# Patient Record
Sex: FEMALE | Race: WHITE | NOT HISPANIC OR LATINO | Employment: OTHER | URBAN - METROPOLITAN AREA
[De-identification: names, ages, dates, MRNs, and addresses within clinical notes are randomized per-mention and may not be internally consistent; named-entity substitution may affect disease eponyms.]

---

## 2018-08-05 ENCOUNTER — APPOINTMENT (OUTPATIENT)
Dept: CT IMAGING | Facility: HOSPITAL | Age: 63
End: 2018-08-05

## 2018-08-05 ENCOUNTER — HOSPITAL ENCOUNTER (OUTPATIENT)
Facility: HOSPITAL | Age: 63
Setting detail: OBSERVATION
Discharge: HOME | End: 2018-08-06
Attending: EMERGENCY MEDICINE | Admitting: EMERGENCY MEDICINE

## 2018-08-05 ENCOUNTER — APPOINTMENT (OUTPATIENT)
Dept: GENERAL RADIOLOGY | Facility: HOSPITAL | Age: 63
End: 2018-08-05

## 2018-08-05 DIAGNOSIS — R11.2 INTRACTABLE VOMITING WITH NAUSEA, UNSPECIFIED VOMITING TYPE: Primary | ICD-10-CM

## 2018-08-05 DIAGNOSIS — E87.1 HYPONATREMIA: ICD-10-CM

## 2018-08-05 DIAGNOSIS — E87.29 HIGH ANION GAP METABOLIC ACIDOSIS: ICD-10-CM

## 2018-08-05 PROBLEM — I10 ESSENTIAL (PRIMARY) HYPERTENSION: Status: ACTIVE | Noted: 2018-08-05

## 2018-08-05 PROBLEM — E87.20 LACTIC ACIDOSIS: Status: ACTIVE | Noted: 2018-08-05

## 2018-08-05 PROBLEM — E08.43 DIABETES MELLITUS DUE TO UNDERLYING CONDITION WITH DIABETIC AUTONOMIC NEUROPATHY, WITHOUT LONG-TERM CURRENT USE OF INSULIN (HCC): Status: ACTIVE | Noted: 2018-08-05

## 2018-08-05 PROBLEM — E03.9 ACQUIRED HYPOTHYROIDISM: Status: ACTIVE | Noted: 2018-08-05

## 2018-08-05 LAB
ALBUMIN SERPL-MCNC: 5.7 G/DL (ref 3.5–5.2)
ALBUMIN/GLOB SERPL: 2.1 G/DL
ALP SERPL-CCNC: 59 U/L (ref 39–117)
ALT SERPL W P-5'-P-CCNC: 21 U/L (ref 1–33)
ANION GAP SERPL CALCULATED.3IONS-SCNC: 30.6 MMOL/L
APAP SERPL-MCNC: <5 MCG/ML (ref 10–30)
AST SERPL-CCNC: 25 U/L (ref 1–32)
BACTERIA UR QL AUTO: NORMAL /HPF
BASOPHILS # BLD AUTO: 0 10*3/MM3 (ref 0–0.2)
BASOPHILS NFR BLD AUTO: 0 % (ref 0–1.5)
BILIRUB SERPL-MCNC: 1.8 MG/DL (ref 0.1–1.2)
BILIRUB UR QL STRIP: NEGATIVE
BUN BLD-MCNC: 19 MG/DL (ref 8–23)
BUN/CREAT SERPL: 20.9 (ref 7–25)
CALCIUM SPEC-SCNC: 10.1 MG/DL (ref 8.6–10.5)
CHLORIDE SERPL-SCNC: 70 MMOL/L (ref 98–107)
CK SERPL-CCNC: 215 U/L (ref 20–180)
CLARITY UR: CLEAR
CO2 SERPL-SCNC: 25.4 MMOL/L (ref 22–29)
COLOR UR: YELLOW
CREAT BLD-MCNC: 0.91 MG/DL (ref 0.57–1)
D-LACTATE SERPL-SCNC: 1.5 MMOL/L (ref 0.5–2)
D-LACTATE SERPL-SCNC: 2.4 MMOL/L (ref 0.5–2)
DEPRECATED RDW RBC AUTO: 41.8 FL (ref 37–54)
EOSINOPHIL # BLD AUTO: 0 10*3/MM3 (ref 0–0.7)
EOSINOPHIL NFR BLD AUTO: 0 % (ref 0.3–6.2)
ERYTHROCYTE [DISTWIDTH] IN BLOOD BY AUTOMATED COUNT: 13.2 % (ref 11.7–13)
GFR SERPL CREATININE-BSD FRML MDRD: 62 ML/MIN/1.73
GLOBULIN UR ELPH-MCNC: 2.7 GM/DL
GLUCOSE BLD-MCNC: 155 MG/DL (ref 65–99)
GLUCOSE BLDC GLUCOMTR-MCNC: 125 MG/DL (ref 70–130)
GLUCOSE BLDC GLUCOMTR-MCNC: 158 MG/DL (ref 70–130)
GLUCOSE UR STRIP-MCNC: NEGATIVE MG/DL
HCT VFR BLD AUTO: 43.2 % (ref 35.6–45.5)
HGB BLD-MCNC: 15.3 G/DL (ref 11.9–15.5)
HGB UR QL STRIP.AUTO: ABNORMAL
HOLD SPECIMEN: NORMAL
HYALINE CASTS UR QL AUTO: NORMAL /LPF
IMM GRANULOCYTES # BLD: 0.04 10*3/MM3 (ref 0–0.03)
IMM GRANULOCYTES NFR BLD: 0.3 % (ref 0–0.5)
KETONES UR QL STRIP: ABNORMAL
LEUKOCYTE ESTERASE UR QL STRIP.AUTO: NEGATIVE
LIPASE SERPL-CCNC: 26 U/L (ref 13–60)
LYMPHOCYTES # BLD AUTO: 0.56 10*3/MM3 (ref 0.9–4.8)
LYMPHOCYTES NFR BLD AUTO: 3.9 % (ref 19.6–45.3)
MAGNESIUM SERPL-MCNC: 1.9 MG/DL (ref 1.6–2.4)
MCH RBC QN AUTO: 31 PG (ref 26.9–32)
MCHC RBC AUTO-ENTMCNC: 35.4 G/DL (ref 32.4–36.3)
MCV RBC AUTO: 87.4 FL (ref 80.5–98.2)
MONOCYTES # BLD AUTO: 0.4 10*3/MM3 (ref 0.2–1.2)
MONOCYTES NFR BLD AUTO: 2.8 % (ref 5–12)
NEUTROPHILS # BLD AUTO: 13.52 10*3/MM3 (ref 1.9–8.1)
NEUTROPHILS NFR BLD AUTO: 93.3 % (ref 42.7–76)
NITRITE UR QL STRIP: NEGATIVE
OSMOLALITY UR: 799 MOSM/KG
PH UR STRIP.AUTO: <=5 [PH] (ref 5–8)
PHOSPHATE SERPL-MCNC: 3.2 MG/DL (ref 2.5–4.5)
PLATELET # BLD AUTO: 208 10*3/MM3 (ref 140–500)
PMV BLD AUTO: 9.4 FL (ref 6–12)
POTASSIUM BLD-SCNC: 4.1 MMOL/L (ref 3.5–5.2)
PROT SERPL-MCNC: 8.4 G/DL (ref 6–8.5)
PROT UR QL STRIP: ABNORMAL
RBC # BLD AUTO: 4.94 10*6/MM3 (ref 3.9–5.2)
RBC # UR: NORMAL /HPF
REF LAB TEST METHOD: NORMAL
SALICYLATES SERPL-MCNC: <0.3 MG/DL
SODIUM BLD-SCNC: 126 MMOL/L (ref 136–145)
SODIUM UR-SCNC: 87 MMOL/L
SP GR UR STRIP: 1.02 (ref 1–1.03)
SQUAMOUS #/AREA URNS HPF: NORMAL /HPF
TSH SERPL DL<=0.05 MIU/L-ACNC: 5.15 MIU/ML (ref 0.27–4.2)
UROBILINOGEN UR QL STRIP: ABNORMAL
WBC NRBC COR # BLD: 14.48 10*3/MM3 (ref 4.5–10.7)
WBC UR QL AUTO: NORMAL /HPF
WHOLE BLOOD HOLD SPECIMEN: NORMAL

## 2018-08-05 PROCEDURE — 84300 ASSAY OF URINE SODIUM: CPT | Performed by: EMERGENCY MEDICINE

## 2018-08-05 PROCEDURE — 83690 ASSAY OF LIPASE: CPT

## 2018-08-05 PROCEDURE — 93005 ELECTROCARDIOGRAM TRACING: CPT | Performed by: EMERGENCY MEDICINE

## 2018-08-05 PROCEDURE — 71250 CT THORAX DX C-: CPT

## 2018-08-05 PROCEDURE — 96361 HYDRATE IV INFUSION ADD-ON: CPT

## 2018-08-05 PROCEDURE — 80053 COMPREHEN METABOLIC PANEL: CPT

## 2018-08-05 PROCEDURE — 74177 CT ABD & PELVIS W/CONTRAST: CPT

## 2018-08-05 PROCEDURE — G0378 HOSPITAL OBSERVATION PER HR: HCPCS

## 2018-08-05 PROCEDURE — 84443 ASSAY THYROID STIM HORMONE: CPT | Performed by: EMERGENCY MEDICINE

## 2018-08-05 PROCEDURE — 83605 ASSAY OF LACTIC ACID: CPT | Performed by: EMERGENCY MEDICINE

## 2018-08-05 PROCEDURE — 25010000002 ENOXAPARIN PER 10 MG: Performed by: INTERNAL MEDICINE

## 2018-08-05 PROCEDURE — 71046 X-RAY EXAM CHEST 2 VIEWS: CPT

## 2018-08-05 PROCEDURE — 85025 COMPLETE CBC W/AUTO DIFF WBC: CPT

## 2018-08-05 PROCEDURE — 36415 COLL VENOUS BLD VENIPUNCTURE: CPT

## 2018-08-05 PROCEDURE — 83935 ASSAY OF URINE OSMOLALITY: CPT | Performed by: EMERGENCY MEDICINE

## 2018-08-05 PROCEDURE — 83735 ASSAY OF MAGNESIUM: CPT | Performed by: EMERGENCY MEDICINE

## 2018-08-05 PROCEDURE — 82962 GLUCOSE BLOOD TEST: CPT

## 2018-08-05 PROCEDURE — 63710000001 INSULIN ASPART PER 5 UNITS: Performed by: INTERNAL MEDICINE

## 2018-08-05 PROCEDURE — 93010 ELECTROCARDIOGRAM REPORT: CPT | Performed by: INTERNAL MEDICINE

## 2018-08-05 PROCEDURE — 80307 DRUG TEST PRSMV CHEM ANLYZR: CPT | Performed by: EMERGENCY MEDICINE

## 2018-08-05 PROCEDURE — 25010000002 ONDANSETRON PER 1 MG: Performed by: NURSE PRACTITIONER

## 2018-08-05 PROCEDURE — 82550 ASSAY OF CK (CPK): CPT | Performed by: EMERGENCY MEDICINE

## 2018-08-05 PROCEDURE — 99284 EMERGENCY DEPT VISIT MOD MDM: CPT

## 2018-08-05 PROCEDURE — 25010000002 IOPAMIDOL 61 % SOLUTION: Performed by: EMERGENCY MEDICINE

## 2018-08-05 PROCEDURE — 81001 URINALYSIS AUTO W/SCOPE: CPT

## 2018-08-05 PROCEDURE — 84100 ASSAY OF PHOSPHORUS: CPT | Performed by: EMERGENCY MEDICINE

## 2018-08-05 PROCEDURE — 96374 THER/PROPH/DIAG INJ IV PUSH: CPT

## 2018-08-05 RX ORDER — SODIUM BICARBONATE 650 MG/1
650 TABLET ORAL 2 TIMES DAILY
Status: DISCONTINUED | OUTPATIENT
Start: 2018-08-05 | End: 2018-08-06

## 2018-08-05 RX ORDER — SODIUM CHLORIDE 0.9 % (FLUSH) 0.9 %
1-10 SYRINGE (ML) INJECTION AS NEEDED
Status: DISCONTINUED | OUTPATIENT
Start: 2018-08-05 | End: 2018-08-06 | Stop reason: HOSPADM

## 2018-08-05 RX ORDER — LEVOTHYROXINE SODIUM 175 UG/1
175 TABLET ORAL DAILY
Status: DISCONTINUED | OUTPATIENT
Start: 2018-08-05 | End: 2018-08-06 | Stop reason: HOSPADM

## 2018-08-05 RX ORDER — LISINOPRIL 20 MG/1
20 TABLET ORAL DAILY
Status: DISCONTINUED | OUTPATIENT
Start: 2018-08-05 | End: 2018-08-05

## 2018-08-05 RX ORDER — ONDANSETRON 2 MG/ML
4 INJECTION INTRAMUSCULAR; INTRAVENOUS EVERY 4 HOURS PRN
Status: DISCONTINUED | OUTPATIENT
Start: 2018-08-05 | End: 2018-08-06 | Stop reason: HOSPADM

## 2018-08-05 RX ORDER — NICOTINE POLACRILEX 4 MG
15 LOZENGE BUCCAL
Status: DISCONTINUED | OUTPATIENT
Start: 2018-08-05 | End: 2018-08-06 | Stop reason: HOSPADM

## 2018-08-05 RX ORDER — SODIUM CHLORIDE 0.9 % (FLUSH) 0.9 %
10 SYRINGE (ML) INJECTION AS NEEDED
Status: DISCONTINUED | OUTPATIENT
Start: 2018-08-05 | End: 2018-08-06 | Stop reason: HOSPADM

## 2018-08-05 RX ORDER — ACETAMINOPHEN 325 MG/1
650 TABLET ORAL EVERY 4 HOURS PRN
Status: DISCONTINUED | OUTPATIENT
Start: 2018-08-05 | End: 2018-08-06 | Stop reason: HOSPADM

## 2018-08-05 RX ORDER — LISINOPRIL 10 MG/1
20 TABLET ORAL DAILY
COMMUNITY
End: 2018-08-06 | Stop reason: HOSPADM

## 2018-08-05 RX ORDER — ONDANSETRON 2 MG/ML
4 INJECTION INTRAMUSCULAR; INTRAVENOUS ONCE
Status: COMPLETED | OUTPATIENT
Start: 2018-08-05 | End: 2018-08-05

## 2018-08-05 RX ORDER — LEVOTHYROXINE SODIUM 175 UG/1
175 TABLET ORAL DAILY
COMMUNITY
End: 2018-08-20

## 2018-08-05 RX ORDER — DEXTROSE MONOHYDRATE 25 G/50ML
25 INJECTION, SOLUTION INTRAVENOUS
Status: DISCONTINUED | OUTPATIENT
Start: 2018-08-05 | End: 2018-08-06 | Stop reason: HOSPADM

## 2018-08-05 RX ORDER — SODIUM CHLORIDE 9 MG/ML
100 INJECTION, SOLUTION INTRAVENOUS CONTINUOUS
Status: DISCONTINUED | OUTPATIENT
Start: 2018-08-05 | End: 2018-08-06 | Stop reason: HOSPADM

## 2018-08-05 RX ORDER — HYDROCODONE BITARTRATE AND ACETAMINOPHEN 7.5; 325 MG/1; MG/1
1 TABLET ORAL EVERY 4 HOURS PRN
Status: DISCONTINUED | OUTPATIENT
Start: 2018-08-05 | End: 2018-08-06 | Stop reason: HOSPADM

## 2018-08-05 RX ADMIN — SODIUM CHLORIDE 1000 ML: 9 INJECTION, SOLUTION INTRAVENOUS at 07:45

## 2018-08-05 RX ADMIN — INSULIN ASPART 2 UNITS: 100 INJECTION, SOLUTION INTRAVENOUS; SUBCUTANEOUS at 20:03

## 2018-08-05 RX ADMIN — SODIUM CHLORIDE 100 ML/HR: 9 INJECTION, SOLUTION INTRAVENOUS at 16:34

## 2018-08-05 RX ADMIN — ONDANSETRON HYDROCHLORIDE 4 MG: 2 INJECTION, SOLUTION INTRAMUSCULAR; INTRAVENOUS at 07:47

## 2018-08-05 RX ADMIN — LEVOTHYROXINE SODIUM 175 MCG: 175 TABLET ORAL at 20:06

## 2018-08-05 RX ADMIN — LISINOPRIL 20 MG: 20 TABLET ORAL at 20:06

## 2018-08-05 RX ADMIN — SODIUM BICARBONATE 650 MG: 650 TABLET ORAL at 20:06

## 2018-08-05 RX ADMIN — IOPAMIDOL 85 ML: 612 INJECTION, SOLUTION INTRAVENOUS at 08:59

## 2018-08-05 NOTE — ED PROVIDER NOTES
EMERGENCY DEPARTMENT ENCOUNTER    CHIEF COMPLAINT  Chief Complaint: n/v  History given by:patient  History limited by:nothing  Time Seen: 0740  Room Number: 28/28  PMD: Alix Ariadne Nina, OLEG      HPI:  Pt is a 63 y.o. female who presents with vomiting ×2 days.  Patient denies any abdominal pain, chest pain, back pain, diarrhea, fever, chills, or urinary concerns.  She states same thing happened to her about 2 months ago and she is diagnosed with urinary tract infection.  Patient denies any sick contacts or being out of the country.  Patient reports now she feels extremely fatigued and weak    PAST MEDICAL HISTORY  Active Ambulatory Problems     Diagnosis Date Noted   • No Active Ambulatory Problems     Resolved Ambulatory Problems     Diagnosis Date Noted   • No Resolved Ambulatory Problems     No Additional Past Medical History       PAST SURGICAL HISTORY  Past Surgical History:   Procedure Laterality Date   • BREAST LUMPECTOMY      right 2012       FAMILY HISTORY  No family history on file.    SOCIAL HISTORY  Social History     Social History   • Marital status:      Spouse name: N/A   • Number of children: N/A   • Years of education: N/A     Occupational History   • Not on file.     Social History Main Topics   • Smoking status: Not on file   • Smokeless tobacco: Not on file   • Alcohol use Not on file   • Drug use: Unknown   • Sexual activity: Not on file     Other Topics Concern   • Not on file     Social History Narrative   • No narrative on file         ALLERGIES  Patient has no known allergies.    REVIEW OF SYSTEMS  Review of Systems   Constitutional: Negative.  Negative for activity change, appetite change ( decreased), chills, fatigue and fever.   HENT: Negative.  Negative for congestion, ear pain, rhinorrhea and sore throat.    Eyes: Negative.    Respiratory: Negative.  Negative for cough and shortness of breath.    Cardiovascular: Negative.  Negative for chest pain, palpitations and leg  swelling ( pedal).   Gastrointestinal: Positive for nausea and vomiting. Negative for abdominal pain, constipation and diarrhea.   Endocrine: Negative.    Genitourinary: Negative.  Negative for decreased urine volume, difficulty urinating, dysuria, menstrual problem, pelvic pain, urgency and vaginal discharge.   Musculoskeletal: Negative.  Negative for back pain.   Skin: Negative.  Negative for rash.   Allergic/Immunologic: Negative.    Neurological: Negative.  Negative for dizziness, weakness, light-headedness, numbness and headaches.   Hematological: Negative.    Psychiatric/Behavioral: Negative.  The patient is not nervous/anxious.    All other systems reviewed and are negative.      PHYSICAL EXAM  ED Triage Vitals   Temp Heart Rate Resp BP SpO2   08/05/18 0546 08/05/18 0546 08/05/18 0546 08/05/18 0742 08/05/18 0546   98.9 °F (37.2 °C) 100 17 160/95 96 %      Temp src Heart Rate Source Patient Position BP Location FiO2 (%)   08/05/18 0546 -- -- -- --   Tympanic           Physical Exam   Constitutional: She is well-developed, well-nourished, and in no distress. No distress.   HENT:   Head: Normocephalic and atraumatic.   Mouth/Throat: Oropharynx is clear and moist and mucous membranes are normal.   Eyes: Pupils are equal, round, and reactive to light.   Neck: Normal range of motion.   Cardiovascular: Normal rate, regular rhythm and normal heart sounds.    Pulmonary/Chest: Effort normal and breath sounds normal. She has no wheezes.   Abdominal: Soft. Bowel sounds are normal. There is no tenderness. There is no rebound and no guarding.   Musculoskeletal: Normal range of motion. She exhibits no edema.   Neurological: She is alert.   Skin: Skin is warm and dry. No rash noted.   Psychiatric: Mood, memory, affect and judgment normal.   Nursing note and vitals reviewed.      LAB RESULTS  Recent Results (from the past 24 hour(s))   Comprehensive Metabolic Panel    Collection Time: 08/05/18  7:01 AM   Result Value Ref  Range    Glucose 155 (H) 65 - 99 mg/dL    BUN 19 8 - 23 mg/dL    Creatinine 0.91 0.57 - 1.00 mg/dL    Sodium 126 (L) 136 - 145 mmol/L    Potassium 4.1 3.5 - 5.2 mmol/L    Chloride 70 (L) 98 - 107 mmol/L    CO2 25.4 22.0 - 29.0 mmol/L    Calcium 10.1 8.6 - 10.5 mg/dL    Total Protein 8.4 6.0 - 8.5 g/dL    Albumin 5.70 (H) 3.50 - 5.20 g/dL    ALT (SGPT) 21 1 - 33 U/L    AST (SGOT) 25 1 - 32 U/L    Alkaline Phosphatase 59 39 - 117 U/L    Total Bilirubin 1.8 (H) 0.1 - 1.2 mg/dL    eGFR Non African Amer 62 >60 mL/min/1.73    Globulin 2.7 gm/dL    A/G Ratio 2.1 g/dL    BUN/Creatinine Ratio 20.9 7.0 - 25.0    Anion Gap 30.6 mmol/L   Lipase    Collection Time: 08/05/18  7:01 AM   Result Value Ref Range    Lipase 26 13 - 60 U/L   CBC Auto Differential    Collection Time: 08/05/18  7:01 AM   Result Value Ref Range    WBC 14.48 (H) 4.50 - 10.70 10*3/mm3    RBC 4.94 3.90 - 5.20 10*6/mm3    Hemoglobin 15.3 11.9 - 15.5 g/dL    Hematocrit 43.2 35.6 - 45.5 %    MCV 87.4 80.5 - 98.2 fL    MCH 31.0 26.9 - 32.0 pg    MCHC 35.4 32.4 - 36.3 g/dL    RDW 13.2 (H) 11.7 - 13.0 %    RDW-SD 41.8 37.0 - 54.0 fl    MPV 9.4 6.0 - 12.0 fL    Platelets 208 140 - 500 10*3/mm3    Neutrophil % 93.3 (H) 42.7 - 76.0 %    Lymphocyte % 3.9 (L) 19.6 - 45.3 %    Monocyte % 2.8 (L) 5.0 - 12.0 %    Eosinophil % 0.0 (L) 0.3 - 6.2 %    Basophil % 0.0 0.0 - 1.5 %    Immature Grans % 0.3 0.0 - 0.5 %    Neutrophils, Absolute 13.52 (H) 1.90 - 8.10 10*3/mm3    Lymphocytes, Absolute 0.56 (L) 0.90 - 4.80 10*3/mm3    Monocytes, Absolute 0.40 0.20 - 1.20 10*3/mm3    Eosinophils, Absolute 0.00 0.00 - 0.70 10*3/mm3    Basophils, Absolute 0.00 0.00 - 0.20 10*3/mm3    Immature Grans, Absolute 0.04 (H) 0.00 - 0.03 10*3/mm3   Urinalysis With Microscopic If Indicated (No Culture) - Urine, Clean Catch    Collection Time: 08/05/18  7:07 AM   Result Value Ref Range    Color, UA Yellow Yellow, Straw    Appearance, UA Clear Clear    pH, UA <=5.0 5.0 - 8.0    Specific Pr[place]ard, UA  "1.024 1.005 - 1.030    Glucose, UA Negative Negative    Ketones, UA 80 mg/dL (3+) (A) Negative    Bilirubin, UA Negative Negative    Blood, UA Trace (A) Negative    Protein, UA 30 mg/dL (1+) (A) Negative    Leuk Esterase, UA Negative Negative    Nitrite, UA Negative Negative    Urobilinogen, UA 1.0 E.U./dL 0.2 - 1.0 E.U./dL   Urinalysis, Microscopic Only - Urine, Clean Catch    Collection Time: 08/05/18  7:07 AM   Result Value Ref Range    RBC, UA 0-2 None Seen, 0-2 /HPF    WBC, UA 0-2 None Seen, 0-2 /HPF    Bacteria, UA None Seen None Seen /HPF    Squamous Epithelial Cells, UA 0-2 None Seen, 0-2 /HPF    Hyaline Casts, UA 3-6 None Seen /LPF    Methodology Automated Microscopy        I ordered the above labs and reviewed the results    PROGRESS AND CONSULTS    0800-Reviewed pt's history and workup with Dr. Perez; care turned over to Dr. Perez    MEDICATIONS GIVEN IN ER  Medications   sodium chloride 0.9 % flush 10 mL (not administered)   sodium chloride 0.9 % bolus 1,000 mL (not administered)   ondansetron (ZOFRAN) injection 4 mg (not administered)       /95   Pulse 100   Temp 98.9 °F (37.2 °C) (Tympanic)   Resp 17   Ht 170.2 cm (67\")   SpO2 96%        Juliet Warren APRN  08/05/18 0808    "

## 2018-08-05 NOTE — PLAN OF CARE
Problem: Patient Care Overview  Goal: Plan of Care Review  Outcome: Ongoing (interventions implemented as appropriate)   08/05/18 1839   Coping/Psychosocial   Plan of Care Reviewed With patient   Plan of Care Review   Progress improving   OTHER   Outcome Summary new admit from er- upon arriving to floor denies any abdominal pain, tenderness or nausea. reports at home she had intractable nausea for 3 days. started on ivf. repeat lactate level 1.5 - md aware. tolerated clear liquids for lunch- advanced to solids for dinner. will continue to monitor.        Problem: Fall Risk (Adult)  Goal: Identify Related Risk Factors and Signs and Symptoms  Outcome: Ongoing (interventions implemented as appropriate)   08/05/18 1839   Fall Risk (Adult)   Related Risk Factors (Fall Risk) gait/mobility problems;environment unfamiliar   Signs and Symptoms (Fall Risk) presence of risk factors       Problem: Nausea/Vomiting (Adult)  Goal: Identify Related Risk Factors and Signs and Symptoms  Outcome: Ongoing (interventions implemented as appropriate)   08/05/18 1839   Nausea/Vomiting (Adult)   Related Risk Factors (Nausea/Vomiting) gastric irritation;gastrointestinal infection;medication effects   Signs and Symptoms (Nausea/Vomiting) abdominal discomfort/pain

## 2018-08-05 NOTE — ED PROVIDER NOTES
Pt presents to the ED c/o persistent vomiting that began 3 days ago. Pt c/o dry heaves and decreased appetite. Pt denies abd pain, fever, CP, diarrhea, dysuria. Pt has h/o diabetes.     PEx  GENERAL: not distressed  HENT: nares patent, mucous membranes dry  EYES: no scleral icterus  CV: regular rhythm, regular rate  RESPIRATORY: normal effort, CTAB  ABDOMEN: soft, non tender  MUSCULOSKELETAL: no deformity  NEURO: alert, [place]AY, FC  SKIN: warm, dry    Vital signs and nursing notes reviewed.    EKG           EKG time: 9:16 AM  Rhythm/Rate: NSR 82  P waves and NE: normal  QRS, axis: normal   ST and T waves: TWI in avl and V1-V4 with biphasic V5     Interpreted Contemporaneously by me, independently viewed  no prior     Progress Notes and Consults    6:56 AM  Labs ordered for evaluation.     8:43 AM  Additional labs, EKG, and CT abd pelvis ordered.     9:20 AM  Discussed CT abd pelvis results with Dr. Jurado, radiology. Ct shows straining around kidney.     10:11 AM  Rechecked pt who is resting in NAD. Informed pt of low sodium levels. Discussed plan to admit for treatment. Pt understands and agrees with the plan, all questions answered. Consult placed to [facility].     10:26 AM  Discussed pt case with Dr. Lou, [facility], who agrees to admit. CXR ordered per Dr. Lou.     Diagnoses  High anion gap metabolic acidosis  Intractable vomiting  Hyponatremia    Attestation:  The SH[facility] and I have discussed this patient's history, physical exam, and treatment plan.  I have reviewed the documentation and personally had a face to face interaction with the patient. I affirm the documentation and agree with the treatment and plan.  The attached note describes my personal findings.      Documentation assistance provided by billy Weston for Dr. Perez Information recorded by the billy was done at my direction and has been verified and validated by me.           Elza Weston  08/05/18 1032       Clifford Perez II, MD  08/06/18 1041        Clifford Perez II, MD  08/06/18 6176

## 2018-08-05 NOTE — ED NOTES
Pt reports nausea and vomiting since Thursday. Pt denies abd. Pain denies diarrhea and constipation.      Te Sorenson RN  08/05/18 3573

## 2018-08-05 NOTE — ED TRIAGE NOTES
"Pt to ED with c/o N/V.  Pt reports she has had the symptoms x 2 days and has been fatigued. Pt states \"i got like this before and I had a UTI.\"  Pt denies any urinary symptoms or pain.   "

## 2018-08-05 NOTE — H&P
Name: Jennie Lofton ADMIT: 2018   : 1955  PCP: Ariadne Thomson APRN    MRN: [id number] LOS: 0 days   AGE/SEX: 63 y.o. female  ROOM: [facility]/     Chief Complaint   Patient presents with   • Vomiting   • Nausea   • Fatigue         Patient is a 63 y.o. woman who is unknown to our service.  She started having nausea, vomiting and diarrhea on 18.  No fever or chills.  No abdominal pain.  No heartburn.  She feels weak but not dizzy.  She came to the emergency room for further evaluation.  She is feeling better now.  She had a similar episode in 2018.  She was told she had a bladder infection at that time.  She had had urinary frequency but no dysuria then.  She has had neither recently.  Appetite is usually good.  Weight is been the same.  She does not check her sugars at home.  She has been on Janumet for long time.  No other associated symptoms or exacerbating or alleviating factors      Past Medical History:   Diagnosis Date   • Diabetes mellitus (CMS/HCC)    • Hypertension    • Hypothyroid        Past Surgical History:   Procedure Laterality Date   • BREAST LUMPECTOMY      right        Prescriptions Prior to Admission   Medication Sig Dispense Refill Last Dose   • Cholecalciferol (VITAMIN D) 1000 units tablet Take 5,000 Units by mouth Daily.      • levothyroxine (SYNTHROID, LEVOTHROID) 175 MCG tablet Take 175 mcg by mouth Daily.      • lisinopril (PRINIVIL,ZESTRIL) 10 MG tablet Take 20 mg by mouth Daily.      • sitaGLIPtin-metFORMIN (JANUMET)  MG per tablet Take 1 tablet by mouth 2 (Two) Times a Day.        Allergies:  Letrozole    Social History   Substance Use Topics   • Smoking status: Former Smoker   • Smokeless tobacco: Not on file   • Alcohol use No   .  Works at [employer] as a     Family history  Sister and mother have diabetes.  Positive for hypertension.  Negative for heart disease    Review of Systems   Constitutional: Positive for fatigue.  Negative for activity change, appetite change, chills, fever and unexpected weight change.   HENT: Positive for sore throat. Negative for voice change.    Eyes: Negative for visual disturbance.   Respiratory: Positive for cough and wheezing. Negative for shortness of breath.         She's had a nonproductive cough for the last 2 weeks.  It's not getting better.  Occasionally his wheezing.  No dyspnea   Cardiovascular: Negative for chest pain, palpitations and leg swelling.   Gastrointestinal: Positive for diarrhea, nausea and vomiting. Negative for abdominal distention and constipation.   Endocrine: Negative for polydipsia, polyphagia and polyuria.   Genitourinary: Negative for difficulty urinating, dysuria and frequency.   Musculoskeletal: Positive for arthralgias.        Back is a little achy at times.  Sometimes right hip hurts.  Has had bilateral sciatica intermittently.   Allergic/Immunologic: Negative for environmental allergies.   Neurological: Positive for weakness and numbness. Negative for dizziness, tremors and light-headedness.   Psychiatric/Behavioral: Negative for agitation and behavioral problems.         Vital Signs  Temp:  [98.9 °F (37.2 °C)-99 °F (37.2 °C)] 99 °F (37.2 °C)  Heart Rate:  [] 82  Resp:  [16-18] 18  BP: (128-160)/(82-95) 128/82  SpO2:  [93 %-96 %] 94 %  on   ;   Device (Oxygen Therapy): room air  Body mass index is 25.9 kg/m².    Physical Exam   Constitutional: She appears well-developed and well-nourished. No distress.   Obese   HENT:   Head: Normocephalic and atraumatic.   Right Ear: External ear normal.   Left Ear: External ear normal.   Nose: Nose normal.   Mouth/Throat: No oropharyngeal exudate.   Tongue is coated.  Dry mucosa   Eyes: Pupils are equal, round, and reactive to light. Conjunctivae and EOM are normal. Right eye exhibits no discharge. Left eye exhibits no discharge. No scleral icterus.   Neck: Normal range of motion. Neck supple. No JVD present. No tracheal  deviation present. No thyromegaly present.   Cardiovascular: Normal rate, regular rhythm and intact distal pulses.    Murmur heard.  Pulmonary/Chest: No stridor. No respiratory distress. She has wheezes. She has rales.   Wheezing and crackles right mid and base   Abdominal: Soft. Bowel sounds are normal. She exhibits distension. There is no tenderness.   Obese.  No hepatosplenomegaly   Musculoskeletal: She exhibits deformity. She exhibits no edema.   Minimal thickening of the PIP joints   Lymphadenopathy:     She has no cervical adenopathy.   Neurological: She is alert. A sensory deficit is present. No cranial nerve deficit.   Skin: Skin is warm and dry. No rash noted. She is not diaphoretic. No erythema.   Psychiatric: She has a normal mood and affect. Her behavior is normal.   Nursing note and vitals reviewed.      Results Review:   I reviewed the patient's new clinical results.    Lab Results   Component Value Date    GLUCOSE 155 (H) 08/05/2018    BUN 19 08/05/2018    CREATININE 0.91 08/05/2018    EGFRIFNONA 62 08/05/2018    BCR 20.9 08/05/2018    K 4.1 08/05/2018    CO2 25.4 08/05/2018    CALCIUM 10.1 08/05/2018    ALBUMIN 5.70 (H) 08/05/2018    AST 25 08/05/2018    ALT 21 08/05/2018       Lab Results   Component Value Date    WBC 14.48 (H) 08/05/2018    HGB 15.3 08/05/2018    HCT 43.2 08/05/2018    MCV 87.4 08/05/2018     08/05/2018             Imaging Results (last 24 hours)     Procedure Component Value Units Date/Time    XR Chest 2 View [[id number]] Collected:  08/05/18 1101     Updated:  08/05/18 1105    Narrative:       XR CHEST 2 VW-     HISTORY: Female who is 63 years-old,  vomiting     TECHNIQUE: Frontal and lateral views of the chest     COMPARISON: None available     FINDINGS: Heart size is normal. Aorta is tortuous. Pulmonary vasculature  is unremarkable. No focal pulmonary consolidation, pleural effusion, or  pneumothorax. No acute osseous process.       Impression:       No focal pulmonary  consolidation. Tortuous aorta. Follow-up  as clinically indicated.     This report was finalized on 8/5/2018 11:01 AM by Dr. Eugene Jurado M.D.       CT Abdomen Pelvis With Contrast [[id number]] Collected:  08/05/18 0916     Updated:  08/05/18 0923    Narrative:       CT ABDOMEN PELVIS W CONTRAST-     INDICATIONS: Vomiting     TECHNIQUE: Radiation dose reduction techniques were utilized, including  automated exposure control and exposure modulation based on body size.  Enhanced ABDOMEN AND PELVIS CT     COMPARISON: None available     FINDINGS:      Relative low-density of the liver suggests steatosis. Likely focal fat  anteriorly in the left hepatic lobe.     Minimal bilateral perinephric fat stranding, probably representing  chronic change, correlate clinically to exclude possibility of urinary  infection.     Otherwise unremarkable appearance of the liver, gallbladder, spleen,  adrenal glands, pancreas, kidneys, bladder, uterus.     No bowel obstruction or abnormal bowel thickening is identified. Colonic  diverticula are seen that do not appear inflamed. Appendix does not  appear inflamed.     No free intraperitoneal gas or free fluid.     Scattered small mesenteric and para-aortic lymph nodes are seen that are  not significant by size criteria.     Abdominal aorta is not aneurysmal. Aortic and other arterial  calcifications are present.     The lung bases show small atelectasis.     Degenerative changes are seen in the spine, hips. No acute fracture is  identified.             Impression:             1. Colonic diverticulosis. No acute inflammatory process of bowel is  identified.  2. No obstructive uropathy. Minimal bilateral perinephric fat stranding,  probably representing chronic change, correlate clinically to exclude  possibility of urinary infection.  3. Hepatic steatosis.     Discussed by telephone with Clifford Perez, 0918, 8/5/2018.     This report was finalized on 8/5/2018 9:20 AM by Dr. Eugene COLEMAN  RIKY Jurado               Assessment/Plan   1.  Lactic acidosis with dehydration.  Could be secondary to metformin usage.  Chest x-ray was clear however has wheezing, crackles and a suspicious cough.  May have aspirated.  Will check CT chest.  Sodium bicarbonate orally ×2 doses for significant anion gap.  IV fluids.  Advance diet.  Leukocytosis noted.  Recheck in a.m.  2.  Diabetes mellitus type 2.  Hold oral agent.  Sliding scale for now.  Check A1c  3.  Hyponatremia secondary to nausea and vomiting.  Hypovolemic so IV fluids as above  4.  Hypothyroidism, continue replacement dosage.  5.  Blood pressure is okay.  Patient says she is on lisinopril for renal protective effect with diabetes.  Parameters written for which to hold.    I discussed the patients findings and my recommendations with patient.  Discussed with nurse.    Loree Lou MD  [facility]  08/05/18  2:23 PM

## 2018-08-06 VITALS
SYSTOLIC BLOOD PRESSURE: 127 MMHG | HEART RATE: 87 BPM | OXYGEN SATURATION: 94 % | DIASTOLIC BLOOD PRESSURE: 70 MMHG | TEMPERATURE: 98.3 F | WEIGHT: 165.34 LBS | RESPIRATION RATE: 18 BRPM | BODY MASS INDEX: 25.95 KG/M2 | HEIGHT: 67 IN

## 2018-08-06 PROBLEM — F41.9 ANXIETY: Status: ACTIVE | Noted: 2018-08-06

## 2018-08-06 LAB
ANION GAP SERPL CALCULATED.3IONS-SCNC: 12.6 MMOL/L
BUN BLD-MCNC: 14 MG/DL (ref 8–23)
BUN/CREAT SERPL: 16.7 (ref 7–25)
CALCIUM SPEC-SCNC: 9 MG/DL (ref 8.6–10.5)
CHLORIDE SERPL-SCNC: 88 MMOL/L (ref 98–107)
CO2 SERPL-SCNC: 31.4 MMOL/L (ref 22–29)
CREAT BLD-MCNC: 0.84 MG/DL (ref 0.57–1)
D-LACTATE SERPL-SCNC: 0.7 MMOL/L (ref 0.5–2)
DEPRECATED RDW RBC AUTO: 43 FL (ref 37–54)
ERYTHROCYTE [DISTWIDTH] IN BLOOD BY AUTOMATED COUNT: 13.1 % (ref 11.7–13)
GFR SERPL CREATININE-BSD FRML MDRD: 68 ML/MIN/1.73
GLUCOSE BLD-MCNC: 105 MG/DL (ref 65–99)
GLUCOSE BLDC GLUCOMTR-MCNC: 119 MG/DL (ref 70–130)
HBA1C MFR BLD: 5.8 % (ref 4.8–5.6)
HCT VFR BLD AUTO: 40.7 % (ref 35.6–45.5)
HGB BLD-MCNC: 13.4 G/DL (ref 11.9–15.5)
MCH RBC QN AUTO: 29.9 PG (ref 26.9–32)
MCHC RBC AUTO-ENTMCNC: 32.9 G/DL (ref 32.4–36.3)
MCV RBC AUTO: 90.8 FL (ref 80.5–98.2)
PLATELET # BLD AUTO: 142 10*3/MM3 (ref 140–500)
PMV BLD AUTO: 10.1 FL (ref 6–12)
POTASSIUM BLD-SCNC: 3.7 MMOL/L (ref 3.5–5.2)
RBC # BLD AUTO: 4.48 10*6/MM3 (ref 3.9–5.2)
SODIUM BLD-SCNC: 132 MMOL/L (ref 136–145)
WBC NRBC COR # BLD: 7.06 10*3/MM3 (ref 4.5–10.7)

## 2018-08-06 PROCEDURE — 80048 BASIC METABOLIC PNL TOTAL CA: CPT | Performed by: INTERNAL MEDICINE

## 2018-08-06 PROCEDURE — 85027 COMPLETE CBC AUTOMATED: CPT | Performed by: INTERNAL MEDICINE

## 2018-08-06 PROCEDURE — G0378 HOSPITAL OBSERVATION PER HR: HCPCS

## 2018-08-06 PROCEDURE — 96361 HYDRATE IV INFUSION ADD-ON: CPT

## 2018-08-06 PROCEDURE — 83036 HEMOGLOBIN GLYCOSYLATED A1C: CPT | Performed by: INTERNAL MEDICINE

## 2018-08-06 PROCEDURE — 83605 ASSAY OF LACTIC ACID: CPT | Performed by: INTERNAL MEDICINE

## 2018-08-06 PROCEDURE — 82962 GLUCOSE BLOOD TEST: CPT

## 2018-08-06 RX ORDER — BUSPIRONE HYDROCHLORIDE 5 MG/1
5 TABLET ORAL 3 TIMES DAILY
Qty: 90 TABLET | Refills: 0 | Status: SHIPPED | OUTPATIENT
Start: 2018-08-06 | End: 2018-08-20 | Stop reason: SDUPTHER

## 2018-08-06 RX ADMIN — SODIUM CHLORIDE 100 ML/HR: 9 INJECTION, SOLUTION INTRAVENOUS at 02:23

## 2018-08-06 RX ADMIN — LEVOTHYROXINE SODIUM 175 MCG: 175 TABLET ORAL at 09:47

## 2018-08-06 NOTE — PROGRESS NOTES
Case Management Discharge Note    Final Note: Patient has been DC'd home         Other: Other (Private car)    Final Discharge Disposition Code: 01 - home or self-care

## 2018-08-06 NOTE — DISCHARGE SUMMARY
Date of Admission: 8/5/2018  Date of Discharge:  8/6/2018  Primary Care Physician: Ariadne Thomson, OLEG     Discharge Diagnosis:  Active Hospital Problems    Diagnosis Date Noted   • **Lactic acidosis [E87.2] 08/05/2018   • Anxiety [F41.9] 08/06/2018   • Intractable vomiting with nausea [R11.2] 08/05/2018   • Diabetes mellitus due to underlying condition with diabetic autonomic neuropathy, without long-term current use of insulin (CMS/[facility]) [E08.43] 08/05/2018   • Hyponatremia [E87.1] 08/05/2018   • Acquired hypothyroidism [E03.9] 08/05/2018   • Essential (primary) hypertension [I10] 08/05/2018      Resolved Hospital Problems    Diagnosis Date Noted Date Resolved   No resolved problems to display.       Presenting Problem/History of Present Illness:  Hyponatremia [E87.1]  High anion gap metabolic acidosis [E87.2]  Intractable vomiting with nausea, unspecified vomiting type [R11.2]  Intractable vomiting with nausea, unspecified vomiting type [R11.2]     Hospital Course:  The patient is a 63 y.o. woman admitted for nausea, vomiting and diarrhea.  She had significant anionic gap and elevated lactic acid level.  She was started on IV fluids and received a dose of oral bicarbonate.  JanuMet was discontinued.  Sodium level was low on admission; it has improved today.  Her symptoms resolved quickly.  She feels much better.  Blood pressures on the low side.  Stay off lisinopril.  Stay off Janumet.    She has significant anxiety.  She will be started on BuSpar.  She will need follow-up with her primary care provider    Stable condition; good prognosis    Exam Today:  Feels much better.  No further nausea vomiting or diarrhea.  Ate almost all of breakfast.  Reports fairly severe anxiety when change is coming.  Going back to school this week.  Became very tearful telling me about it.    Mother and sister on Prozac for similar issues.    Procedures Performed:  Xr Chest 2 View    Result Date: 8/5/2018  No focal pulmonary  consolidation. Tortuous aorta. Follow-up as clinically indicated.  This report was finalized on 8/5/2018 11:01 AM by Dr. Eugene Jurado M.D.      Ct Chest Without Contrast    Result Date: 8/6/2018  Linear scarring or atelectasis along the lateral aspect of the right lung base. There is no evidence for acute process in the chest.  Radiation dose reduction techniques were utilized, including automated exposure control and exposure modulation based on body size.  This report was finalized on 8/6/2018 7:53 AM by Dr. Leland Fried M.D.      Ct Abdomen Pelvis With Contrast    Result Date: 8/5/2018    1. Colonic diverticulosis. No acute inflammatory process of bowel is identified. 2. No obstructive uropathy. Minimal bilateral perinephric fat stranding, probably representing chronic change, correlate clinically to exclude possibility of urinary infection. 3. Hepatic steatosis.  Discussed by telephone with Clifford Perez, 0918, 8/5/2018.  This report was finalized on 8/5/2018 9:20 AM by Dr. Eugene Jurado M.D.             Labs:  Lab Results   Component Value Date    WBC 7.06 08/06/2018    HGB 13.4 08/06/2018    HCT 40.7 08/06/2018     08/06/2018     Lab Results   Component Value Date     (L) 08/06/2018    K 3.7 08/06/2018    CL 88 (L) 08/06/2018    CO2 31.4 (H) 08/06/2018    BUN 14 08/06/2018    CREATININE 0.84 08/06/2018    GLUCOSE 105 (H) 08/06/2018     A1c 5.8  Lactic acid this morning 0.7  TSH 5.2      Consults:   None     Discharge Disposition:  Home or Self Care    Discharge Medications:     Discharge Medications      New Medications      Instructions Start Date   busPIRone 5 MG tablet  Commonly known as:  BUSPAR   5 mg, Oral, 3 Times Daily         Continue These Medications      Instructions Start Date   levothyroxine 175 MCG tablet  Commonly known as:  SYNTHROID, LEVOTHROID   175 mcg, Oral, Daily      Vitamin D 1000 units tablet   5,000 Units, Oral, Daily         Stop These Medications     lisinopril 10 MG tablet  Commonly known as:  PRINIVIL,ZESTRIL     sitaGLIPtin-metFORMIN  MG per tablet  Commonly known as:  JANUMET            Discharge Diet:   Diet Instructions     Diet: Consistent Carbohydrate       Discharge Diet:  Consistent Carbohydrate          Activity at Discharge:   Activity Instructions     Activity as Tolerated             Follow-up Appointments:  Future Appointments  Date Time Provider Department Center   8/7/2018 9:30 AM Frank Saenz DO [facility] None     Follow-up Information     Ariadne Thomson APRN Follow up in 2 week(s).    Specialty:  Family Medicine  Why:  dm, htn, anxiety  Contact information:  [address]  [place] [address]  [phone]                     Test Results Pending at Discharge:   Order Current Status    [place] Draw In process           Loree Lou MD  08/06/18  9:16 AM    Time Spent on Discharge Activities: 35min

## 2018-08-06 NOTE — PLAN OF CARE
Problem: Patient Care Overview  Goal: Plan of Care Review  Outcome: Ongoing (interventions implemented as appropriate)   08/06/18 0402   Coping/Psychosocial   Plan of Care Reviewed With patient   Plan of Care Review   Progress improving   OTHER   Outcome Summary Patient has had no c/o pain or nausea this shift. Has been on NCS/Renal diet and tolerating so far. Lisinopril stopped after night dose had been given. VSS. ACHS. Educated on insulin and SSI coverage as patient had never needed it prior. IVF continued. CT chest done at shift change. Low Na, monitoring closely with daily labs. Will continue to monitor closely.      Goal: Individualization and Mutuality  Outcome: Ongoing (interventions implemented as appropriate)    Goal: Discharge Needs Assessment  Outcome: Ongoing (interventions implemented as appropriate)      Problem: Fall Risk (Adult)  Goal: Identify Related Risk Factors and Signs and Symptoms  Outcome: Outcome(s) achieved Date Met: 08/06/18    Goal: Absence of Fall  Outcome: Ongoing (interventions implemented as appropriate)      Problem: Nausea/Vomiting (Adult)  Goal: Identify Related Risk Factors and Signs and Symptoms  Outcome: Ongoing (interventions implemented as appropriate)    Goal: Adequate Hydration  Outcome: Ongoing (interventions implemented as appropriate)

## 2018-08-07 ENCOUNTER — READMISSION MANAGEMENT (OUTPATIENT)
Dept: CALL CENTER | Facility: HOSPITAL | Age: 63
End: 2018-08-07

## 2018-08-07 NOTE — OUTREACH NOTE
Prep Survey      Responses   Facility patient discharged from?  [place]   Is patient eligible?  Yes   Discharge diagnosis  Lactic acidosis   Does the patient have one of the following disease processes/diagnoses(primary or secondary)?  Other   Does the patient have Home health ordered?  No   Is there a DME ordered?  No   Prep survey completed?  Yes          Lea Bowen RN

## 2018-08-08 ENCOUNTER — READMISSION MANAGEMENT (OUTPATIENT)
Dept: CALL CENTER | Facility: HOSPITAL | Age: 63
End: 2018-08-08

## 2018-08-08 NOTE — OUTREACH NOTE
Medical Week 1 Survey      Responses   Facility patient discharged from?  [place]   Does the patient have one of the following disease processes/diagnoses(primary or secondary)?  Other   Is there a successful TCM telephone encounter documented?  No   Week 1 attempt successful?  Yes   Call start time  1135   Call end time  1144   Discharge diagnosis  Lactic acidosis   Meds reviewed with patient/caregiver?  Yes   Is the patient having any side effects they believe may be caused by any medication additions or changes?  Yes   Side effects comments   Getting used to the Buspar.   Does the patient have all medications ordered at discharge?  Yes   Is the patient taking all medications as directed (includes completed medication regime)?  No   What is preventing the patient from taking all medications as directed?  Other, Desires to consult PCP first   Nursing Interventions  Nurse provided patient education, Advised patient to call provider   Medication comments  States that she started taking her 1/2 tb diabetes medication r/t BS going into the 170s. Advised pt it would be better if she called PCP about this and made a sooner appt. Advised to keep a log.   Does the patient have a primary care provider?   Yes   Does the patient have an appointment with their PCP within 7 days of discharge?  Greater than 7 days   Has the patient kept scheduled appointments due by today?  Yes   Comments  She will call to see PCP sooner.   Has home health visited the patient within 72 hours of discharge?  N/A   Psychosocial issues?  No   Did the patient receive a copy of their discharge instructions?  Yes   Nursing interventions  Reviewed instructions with patient   What is the patient's perception of their health status since discharge?  Improving   Is the patient/caregiver able to teach back signs and symptoms related to disease process for when to call PCP?  Yes   Is the patient/caregiver able to teach back signs and symptoms related to  disease process for when to call 911?  Yes   Is the patient/caregiver able to teach back the hierarchy of who to call/visit for symptoms/problems? PCP, Specialist, Home health nurse, Urgent Care, ED, 911  Yes   Additional teach back comments  Advised to speak to PCP sooner and speak about meds.   Week 1 call completed?  Yes          Tuan Vigil RN

## 2018-08-17 ENCOUNTER — READMISSION MANAGEMENT (OUTPATIENT)
Dept: CALL CENTER | Facility: HOSPITAL | Age: 63
End: 2018-08-17

## 2018-08-17 NOTE — OUTREACH NOTE
Medical Week 2 Survey      Responses   Facility patient discharged from?  [place]   Does the patient have one of the following disease processes/diagnoses(primary or secondary)?  Other   Week 2 attempt successful?  No   Unsuccessful attempts  Attempt 1          Julius Paul RN

## 2018-08-20 ENCOUNTER — OFFICE VISIT (OUTPATIENT)
Dept: FAMILY MEDICINE CLINIC | Facility: CLINIC | Age: 63
End: 2018-08-20

## 2018-08-20 VITALS
BODY MASS INDEX: 27.47 KG/M2 | DIASTOLIC BLOOD PRESSURE: 70 MMHG | SYSTOLIC BLOOD PRESSURE: 140 MMHG | HEIGHT: 67 IN | HEART RATE: 78 BPM | OXYGEN SATURATION: 94 % | TEMPERATURE: 98.2 F | WEIGHT: 175 LBS

## 2018-08-20 DIAGNOSIS — R35.0 URINARY FREQUENCY: ICD-10-CM

## 2018-08-20 DIAGNOSIS — E08.43 DIABETES MELLITUS DUE TO UNDERLYING CONDITION WITH DIABETIC AUTONOMIC NEUROPATHY, WITHOUT LONG-TERM CURRENT USE OF INSULIN (HCC): ICD-10-CM

## 2018-08-20 DIAGNOSIS — I10 ESSENTIAL (PRIMARY) HYPERTENSION: ICD-10-CM

## 2018-08-20 DIAGNOSIS — E03.9 ACQUIRED HYPOTHYROIDISM: ICD-10-CM

## 2018-08-20 DIAGNOSIS — E11.9 TYPE 2 DIABETES MELLITUS WITHOUT COMPLICATION, WITHOUT LONG-TERM CURRENT USE OF INSULIN (HCC): Primary | ICD-10-CM

## 2018-08-20 DIAGNOSIS — F41.9 ANXIETY: ICD-10-CM

## 2018-08-20 DIAGNOSIS — E87.20 LACTIC ACIDOSIS: ICD-10-CM

## 2018-08-20 DIAGNOSIS — Z12.39 BREAST CANCER SCREENING: ICD-10-CM

## 2018-08-20 DIAGNOSIS — E87.1 HYPONATREMIA: ICD-10-CM

## 2018-08-20 PROBLEM — R55 SYNCOPE: Status: ACTIVE | Noted: 2018-03-18

## 2018-08-20 PROBLEM — E86.0 LUETSCHER'S SYNDROME: Status: ACTIVE | Noted: 2018-03-18

## 2018-08-20 PROBLEM — N39.0 UTI (URINARY TRACT INFECTION): Status: ACTIVE | Noted: 2018-03-18

## 2018-08-20 LAB
POC CREATININE URINE: 200
POC MICROALBUMIN URINE: 10

## 2018-08-20 PROCEDURE — 82044 UR ALBUMIN SEMIQUANTITATIVE: CPT | Performed by: FAMILY MEDICINE

## 2018-08-20 PROCEDURE — 99204 OFFICE O/P NEW MOD 45 MIN: CPT | Performed by: FAMILY MEDICINE

## 2018-08-20 RX ORDER — LISINOPRIL 20 MG/1
20 TABLET ORAL DAILY
COMMUNITY
End: 2018-08-20 | Stop reason: SDUPTHER

## 2018-08-20 RX ORDER — LEVOTHYROXINE SODIUM 0.15 MG/1
150 TABLET ORAL DAILY
COMMUNITY
End: 2018-08-20 | Stop reason: SDUPTHER

## 2018-08-20 RX ORDER — LANCETS 33 GAUGE
EACH MISCELLANEOUS
COMMUNITY
Start: 2018-06-13 | End: 2019-05-07

## 2018-08-20 RX ORDER — LISINOPRIL 20 MG/1
20 TABLET ORAL DAILY
Qty: 90 TABLET | Refills: 1 | Status: SHIPPED | OUTPATIENT
Start: 2018-08-20 | End: 2019-05-21 | Stop reason: SDUPTHER

## 2018-08-20 RX ORDER — LEVOTHYROXINE SODIUM 0.15 MG/1
150 TABLET ORAL DAILY
Qty: 90 TABLET | Refills: 1 | Status: SHIPPED | OUTPATIENT
Start: 2018-08-20 | End: 2018-10-29 | Stop reason: DRUGHIGH

## 2018-08-20 RX ORDER — BUSPIRONE HYDROCHLORIDE 5 MG/1
5 TABLET ORAL 3 TIMES DAILY
Qty: 360 TABLET | Refills: 5 | Status: SHIPPED | OUTPATIENT
Start: 2018-08-20 | End: 2019-05-07

## 2018-08-20 NOTE — PROGRESS NOTES
Subjective   Jennie Lofton is a 63 y.o. female. Presents today for   Chief Complaint   Patient presents with   • Establish Care     pt here to establish care. she was in [facility] earlier this month, here for hospital follow up. she needs order for mammogram. she wants to know if she should have her urine checked because she had uti in the past with no symptoms.     new patient here to establish care.  + hospital f/u.  Diabetes   She presents for her follow-up (Hospitalized x2 in few months for lactic acidosis.) diabetic visit. She has type 2 diabetes mellitus. Her disease course has been worsening. Pertinent negatives for hypoglycemia include no dizziness, headaches, speech difficulty or tremors. Pertinent negatives for diabetes include no chest pain, no foot paresthesias, no foot ulcerations and no weakness. (BS's went up to 200s off medication.) Symptoms are improving. Pertinent negatives for diabetic complications include no CVA. Risk factors for coronary artery disease include diabetes mellitus, dyslipidemia, hypertension and post-menopausal. She is following a diabetic diet. An ACE inhibitor/angiotensin II receptor blocker is being taken.   Hypertension   This is a chronic (Was off lisinopril at d/c but restarted as BP went high again.) problem. The current episode started more than 1 year ago. The problem is unchanged. The problem is controlled. Pertinent negatives include no chest pain, headaches, palpitations or shortness of breath. Risk factors for coronary artery disease include dyslipidemia, diabetes mellitus and post-menopausal state. Past treatments include ACE inhibitors. Current antihypertension treatment includes ACE inhibitors. The current treatment provides moderate improvement. There are no compliance problems.  There is no history of angina, kidney disease, CAD/MI, CVA, heart failure or left ventricular hypertrophy. There is no history of chronic renal disease.   Hypothyroidism   This  is a chronic problem. The current episode started more than 1 year ago. The problem occurs constantly. The problem has been unchanged. Pertinent negatives include no abdominal pain, chest pain, headaches, myalgias, nausea, numbness, vomiting or weakness. Nothing aggravates the symptoms. Treatments tried: on levothyroxine. Improvement on treatment: TSH - was mildly elevated but missed several doses.   has also hyponatremia while inpatint  No uti symptoms;  Has urinary frequency.  No burning or dysuria.  Has anxiety, feels buspar as helping greatly.    Review of Systems   Constitutional: Unexpected weight change: Weight gain or loss.   Respiratory: Negative for shortness of breath and wheezing.    Cardiovascular: Negative for chest pain, palpitations and leg swelling.   Gastrointestinal: Negative for abdominal pain, nausea and vomiting.   Musculoskeletal: Negative for myalgias.   Neurological: Negative for dizziness, tremors, syncope, facial asymmetry, speech difficulty, weakness, light-headedness, numbness and headaches.       Patient Active Problem List   Diagnosis   • Intractable vomiting with nausea   • Lactic acidosis   • Diabetes mellitus due to underlying condition with diabetic autonomic neuropathy, without long-term current use of insulin (CMS/HCC)   • Hyponatremia   • Acquired hypothyroidism   • Essential (primary) hypertension   • Anxiety   • Breast mass   • Breast pain   • Luetscher's syndrome   • DM II (diabetes mellitus, type II), controlled (CMS/HCC)   • Encounter for screening colonoscopy   • History of breast cancer   • Syncope   • UTI (urinary tract infection)     Past Medical History:   Diagnosis Date   • Breast cancer (CMS/HCC)    • Diabetes mellitus (CMS/HCC)    • Hypertension    • Hypothyroid      Past Surgical History:   Procedure Laterality Date   • BREAST LUMPECTOMY      right 2012     Family History   Problem Relation Age of Onset   • Diabetes Mother    • Diabetes Sister        Social History  "    Social History   • Marital status:      Social History Main Topics   • Smoking status: Former Smoker     Years: 4.00   • Smokeless tobacco: Never Used   • Alcohol use Yes      Comment: rare   • Drug use: No     Other Topics Concern   • Not on file       Allergies   Allergen Reactions   • Metformin And Related GI Intolerance     Admitted x2 with lactic acidosis   • Letrozole Rash       No current outpatient prescriptions on file prior to visit.     No current facility-administered medications on file prior to visit.        Objective   Vitals:    08/20/18 1540   BP: 140/70   BP Location: Left arm   Patient Position: Sitting   Cuff Size: Adult   Pulse: 78   Temp: 98.2 °F (36.8 °C)   TempSrc: Oral   SpO2: 94%   Weight: 79.4 kg (175 lb)   Height: 170.2 cm (67.01\")       Physical Exam   Constitutional: She appears well-developed and well-nourished.   HENT:   Head: Normocephalic and atraumatic.   Neck: Neck supple. No JVD present. No thyromegaly present.   Cardiovascular: Normal rate, regular rhythm and normal heart sounds.  Exam reveals no gallop and no friction rub.    No murmur heard.  Pulmonary/Chest: Effort normal and breath sounds normal. No respiratory distress. She has no wheezes. She has no rales.   Abdominal: Soft. Bowel sounds are normal. She exhibits no distension. There is no tenderness. There is no rebound and no guarding.   Musculoskeletal: She exhibits no edema.    Jennie had a diabetic foot exam performed (see scanned report) today.   During the foot exam she had a monofilament test performed.  Neurological: She is alert.   Skin: Skin is warm and dry.   Psychiatric: She has a normal mood and affect. Her behavior is normal.   Nursing note and vitals reviewed.  MAL/Cr ordered and reviewed.      Assessment/Plan   Jennie was seen today for establish care.    Diagnoses and all orders for this visit:    Type 2 diabetes mellitus without complication, without long-term current use of insulin " (CMS/[facility])  -     POCT microalbumin  -     lisinopril (PRINIVIL,ZESTRIL) 20 MG tablet; Take 1 tablet by mouth Daily.    Diabetes mellitus due to underlying condition with diabetic autonomic neuropathy, without long-term current use of insulin (CMS/[facility])    Acquired hypothyroidism  -     TSH; Future  -     levothyroxine (SYNTHROID, LEVOTHROID) 150 MCG tablet; Take 1 tablet by mouth Daily.    Hyponatremia  -     Comprehensive Metabolic Panel; Future  -     TSH; Future    Lactic acidosis  -     Comprehensive Metabolic Panel; Future  -     TSH; Future    Essential (primary) hypertension  -     lisinopril (PRINIVIL,ZESTRIL) 20 MG tablet; Take 1 tablet by mouth Daily.    Urinary frequency  -     Urine Culture - Urine, Urine, Clean Catch    Breast cancer screening  -     Mammo Screening Bilateral With CAD; Future    Anxiety  -     busPIRone (BUSPAR) 5 MG tablet; Take 1 tablet by mouth 3 (Three) Times a Day.    Other orders  -     Cholecalciferol (VITAMIN D3) 5000 units capsule capsule; Take 1 capsule by mouth Daily.  -     SITagliptin (JANUVIA) 100 MG tablet; Take 1 tablet by mouth Daily.    -repeat labs ~ 9/17/2018  -continue buspar for anxiety as helping  Urinary frequency - send another culture;  Hold abx as no other symptoms.  -hypertension - controlled, continue medications  -low Na - recheck as directed  -dm2 - will change to just januvia as lactic acidosis x2, d/w not certain related to metformin but given 2 episodes will hold.  She also reports GI issues on metformin anyway.  -due for breast cancer screening  -hypothyroidism - continue medication, recheck thyroid labs.           -Follow up: 3 months and prn

## 2018-08-21 ENCOUNTER — READMISSION MANAGEMENT (OUTPATIENT)
Dept: CALL CENTER | Facility: HOSPITAL | Age: 63
End: 2018-08-21

## 2018-08-21 NOTE — OUTREACH NOTE
Medical Week 2 Survey      Responses   Facility patient discharged from?  [place]   Does the patient have one of the following disease processes/diagnoses(primary or secondary)?  Other   Week 2 attempt successful?  No   Unsuccessful attempts  Attempt 2          Adolfo Burgess RN

## 2018-08-23 ENCOUNTER — READMISSION MANAGEMENT (OUTPATIENT)
Dept: CALL CENTER | Facility: HOSPITAL | Age: 63
End: 2018-08-23

## 2018-08-23 LAB
BACTERIA UR CULT: ABNORMAL
BACTERIA UR CULT: ABNORMAL

## 2018-08-23 RX ORDER — AMPICILLIN 500 MG/1
500 CAPSULE ORAL 3 TIMES DAILY
Qty: 21 CAPSULE | Refills: 0 | Status: SHIPPED | OUTPATIENT
Start: 2018-08-23 | End: 2018-10-22

## 2018-08-23 NOTE — PROGRESS NOTES
Please call the patient regarding her abnormal result.  Urine culture is +, go ahead and start ampicillin 500mg 1 po tid x 7 days

## 2018-08-23 NOTE — OUTREACH NOTE
Medical Week 2 Survey      Responses   Facility patient discharged from?  [place]   Does the patient have one of the following disease processes/diagnoses(primary or secondary)?  Other   Week 2 attempt successful?  No   Unsuccessful attempts  Attempt 3          Rossy Paz RN

## 2018-09-04 ENCOUNTER — READMISSION MANAGEMENT (OUTPATIENT)
Dept: CALL CENTER | Facility: HOSPITAL | Age: 63
End: 2018-09-04

## 2018-09-04 NOTE — OUTREACH NOTE
Medical Week 3 Survey      Responses   Facility patient discharged from?  [place]   Does the patient have one of the following disease processes/diagnoses(primary or secondary)?  Other   Week 3 attempt successful?  Yes   Call start time  1208   Call end time  1216   Discharge diagnosis  Lactic acidosis   Is patient permission given to speak with other caregiver?  Yes   List who call center can speak with     Person spoke with today (if not patient) and relationship     Meds reviewed with patient/caregiver?  Yes   Is the patient taking all medications as directed (includes completed medication regime)?  N/A   Has the patient kept scheduled appointments due by today?  Yes   Comments   says pt was feeling good, but has started feeling bad and BS are in 240's.Advised to call Dr and discuss symptoms.  will relate to pt   What is the patient's perception of their health status since discharge?  Same   Week 3 Call Completed?  Yes          Ariadne Wood RN

## 2018-09-05 ENCOUNTER — TELEPHONE (OUTPATIENT)
Dept: FAMILY MEDICINE CLINIC | Facility: CLINIC | Age: 63
End: 2018-09-05

## 2018-09-07 NOTE — TELEPHONE ENCOUNTER
She had lactic acidosis with metformin twice, would she be okay if we sent something else.  I would suggest try farxiga 5mg 1po daily, this can lower BP.  Also may lose some weight with as causes sugar to spill in urine.  Give Rx card as well if on commercial insurance.

## 2018-09-12 ENCOUNTER — READMISSION MANAGEMENT (OUTPATIENT)
Dept: CALL CENTER | Facility: HOSPITAL | Age: 63
End: 2018-09-12

## 2018-09-12 NOTE — OUTREACH NOTE
"Medical Week 4 Survey      Responses   Facility patient discharged from?  [place]   Does the patient have one of the following disease processes/diagnoses(primary or secondary)?  Other   Week 4 attempt successful?  Yes   Call start time  1331   Call end time  1334   Discharge diagnosis  Lactic acidosis   Is patient permission given to speak with other caregiver?  Yes   List who call center can speak with     Person spoke with today (if not patient) and relationship     Meds reviewed with patient/caregiver?  No   Is the patient having any side effects they believe may be caused by any medication additions or changes?  No   Medication comments  Pt  reported that he is unaware of what she is taking. States \"You'll have to talk to her.\"    Has the patient kept scheduled appointments due by today?  Yes   Comments  Pt  states \"you'll have to talk to her.\"    Is the patient still receiving Home Health Services?  N/A   Psychosocial issues?  No   What is the patient's perception of their health status since discharge?  Improving   Is the patient/caregiver able to teach back signs and symptoms related to disease process for when to call PCP?  Yes   Is the patient/caregiver able to teach back signs and symptoms related to disease process for when to call 911?  Yes   Is the patient/caregiver able to teach back the hierarchy of who to call/visit for symptoms/problems? PCP, Specialist, Home health nurse, Urgent Care, ED, 911  Yes   Week 4 Call Completed?  Yes   Wrap up additional comments  Pt  reports she is \"doing fine.\"           Julius Pual RN  "

## 2018-09-13 NOTE — TELEPHONE ENCOUNTER
Pt called back and was okay to try faxiga 5mg daily. I sent med and she said she will wait and see what her insurance pays on it for the savings card.

## 2018-09-17 ENCOUNTER — RESULTS ENCOUNTER (OUTPATIENT)
Dept: FAMILY MEDICINE CLINIC | Facility: CLINIC | Age: 63
End: 2018-09-17

## 2018-09-17 DIAGNOSIS — E87.20 LACTIC ACIDOSIS: ICD-10-CM

## 2018-09-17 DIAGNOSIS — E03.9 ACQUIRED HYPOTHYROIDISM: ICD-10-CM

## 2018-09-17 DIAGNOSIS — E87.1 HYPONATREMIA: ICD-10-CM

## 2018-10-16 LAB
ALBUMIN SERPL-MCNC: 4.7 G/DL (ref 3.5–5.2)
ALBUMIN/GLOB SERPL: 2.1 G/DL
ALP SERPL-CCNC: 69 U/L (ref 39–117)
ALT SERPL-CCNC: 11 U/L (ref 1–33)
AST SERPL-CCNC: 10 U/L (ref 1–32)
BILIRUB SERPL-MCNC: 0.3 MG/DL (ref 0.1–1.2)
BUN SERPL-MCNC: 11 MG/DL (ref 8–23)
BUN/CREAT SERPL: 15.9 (ref 7–25)
CALCIUM SERPL-MCNC: 10 MG/DL (ref 8.6–10.5)
CHLORIDE SERPL-SCNC: 100 MMOL/L (ref 98–107)
CO2 SERPL-SCNC: 26.8 MMOL/L (ref 22–29)
CREAT SERPL-MCNC: 0.69 MG/DL (ref 0.57–1)
GLOBULIN SER CALC-MCNC: 2.2 GM/DL
GLUCOSE SERPL-MCNC: 163 MG/DL (ref 65–99)
POTASSIUM SERPL-SCNC: 4.7 MMOL/L (ref 3.5–5.2)
PROT SERPL-MCNC: 6.9 G/DL (ref 6–8.5)
SODIUM SERPL-SCNC: 137 MMOL/L (ref 136–145)
TSH SERPL DL<=0.005 MIU/L-ACNC: 0.11 MIU/ML (ref 0.27–4.2)

## 2018-10-22 ENCOUNTER — OFFICE VISIT (OUTPATIENT)
Dept: FAMILY MEDICINE CLINIC | Facility: CLINIC | Age: 63
End: 2018-10-22

## 2018-10-22 VITALS
OXYGEN SATURATION: 96 % | DIASTOLIC BLOOD PRESSURE: 90 MMHG | TEMPERATURE: 98.7 F | BODY MASS INDEX: 28.56 KG/M2 | HEIGHT: 67 IN | SYSTOLIC BLOOD PRESSURE: 148 MMHG | WEIGHT: 182 LBS | HEART RATE: 98 BPM

## 2018-10-22 DIAGNOSIS — N39.0 ACUTE UTI: ICD-10-CM

## 2018-10-22 DIAGNOSIS — G89.29 CHRONIC PAIN OF RIGHT HIP: ICD-10-CM

## 2018-10-22 DIAGNOSIS — R53.83 OTHER FATIGUE: ICD-10-CM

## 2018-10-22 DIAGNOSIS — E08.43 DIABETES MELLITUS DUE TO UNDERLYING CONDITION WITH DIABETIC AUTONOMIC NEUROPATHY, WITHOUT LONG-TERM CURRENT USE OF INSULIN (HCC): Primary | ICD-10-CM

## 2018-10-22 DIAGNOSIS — M25.551 CHRONIC PAIN OF RIGHT HIP: ICD-10-CM

## 2018-10-22 DIAGNOSIS — M79.10 MYALGIA: ICD-10-CM

## 2018-10-22 DIAGNOSIS — E03.8 OTHER SPECIFIED HYPOTHYROIDISM: ICD-10-CM

## 2018-10-22 LAB
BILIRUB BLD-MCNC: NEGATIVE MG/DL
CLARITY, POC: CLEAR
COLOR UR: YELLOW
GLUCOSE UR STRIP-MCNC: NEGATIVE MG/DL
KETONES UR QL: NEGATIVE
LEUKOCYTE EST, POC: NEGATIVE
NITRITE UR-MCNC: NEGATIVE MG/ML
PH UR: 5.5 [PH] (ref 5–8)
PROT UR STRIP-MCNC: NEGATIVE MG/DL
RBC # UR STRIP: ABNORMAL /UL
SP GR UR: 1 (ref 1–1.03)
UROBILINOGEN UR QL: NORMAL

## 2018-10-22 PROCEDURE — 81003 URINALYSIS AUTO W/O SCOPE: CPT | Performed by: FAMILY MEDICINE

## 2018-10-22 PROCEDURE — 99214 OFFICE O/P EST MOD 30 MIN: CPT | Performed by: FAMILY MEDICINE

## 2018-10-22 RX ORDER — CEPHALEXIN 500 MG/1
500 CAPSULE ORAL 2 TIMES DAILY
Qty: 14 CAPSULE | Refills: 0 | Status: SHIPPED | OUTPATIENT
Start: 2018-10-22 | End: 2018-11-26

## 2018-10-22 NOTE — PROGRESS NOTES
Subjective   Jennie Lofton is a 63 y.o. female. Presents today for   Chief Complaint   Patient presents with   • Diabetes     PT IS HAVING TROUBLE GETTING HER SUGARS DOWN, SHE QUIT TAKING FARXIGA.  AND HER NECK STAYS RED   • Back Pain     PT IS HAVING LOWER BACK PAIN AND WEAKNESS   • Edema     PT'S ANKLES HAVE BEEN SWELLING   • Weight Gain     PT IS CONCERNED ABOUT HER WEIGHT GAIN.   • Fatigue     PT IS TIRED ALL THE TIME.       Hypothyroidism   This is a chronic problem. The current episode started more than 1 month ago. The problem occurs constantly. The problem has been unchanged. Associated symptoms include fatigue and myalgias. Pertinent negatives include no change in bowel habit, chills, nausea, numbness, vomiting or weakness. Treatments tried: had TSH was suppressed - d/w options but feeling low. The treatment provided moderate relief.   Urinary Tract Infection    This is a recurrent problem. The current episode started 1 to 4 weeks ago. The problem occurs every urination. The problem has been waxing and waning. There has been no fever. Associated symptoms include urgency. Pertinent negatives include no chills, hematuria, nausea or vomiting. She has tried nothing for the symptoms. The treatment provided no relief. Her past medical history is significant for recurrent UTIs.   Diabetes   She presents for her follow-up diabetic visit. She has type 2 diabetes mellitus. Her disease course has been worsening. Associated symptoms include fatigue. Pertinent negatives for diabetes include no weakness. (Developed back pain, thought possible 2ndary to farxiga so stopped.  Went back on janumet, but bs staying over 160.  Weight has been rising.  Had multiple episodes of lactic acidosis and so stopped metformin.)   Back Pain   This is a recurrent problem. The current episode started 1 to 4 weeks ago. The problem occurs constantly. The problem has been waxing and waning since onset. The pain is present in the sacro-iliac.  The quality of the pain is described as aching. The pain does not radiate. The pain is moderate. The symptoms are aggravated by twisting, lying down and position. Pertinent negatives include no numbness, tingling or weakness. (Told OA of hip in past, but thought related to medication change, so not taking anything.) She has tried nothing for the symptoms. The treatment provided no relief.       Review of Systems   Constitutional: Positive for fatigue. Negative for chills.   Gastrointestinal: Negative for change in bowel habit, nausea and vomiting.   Genitourinary: Positive for urgency. Negative for hematuria.   Musculoskeletal: Positive for back pain and myalgias.   Neurological: Negative for tingling, weakness and numbness.       Patient Active Problem List   Diagnosis   • Intractable vomiting with nausea   • Lactic acidosis   • Diabetes mellitus due to underlying condition with diabetic autonomic neuropathy, without long-term current use of insulin (CMS/[facility])   • Hyponatremia   • Acquired hypothyroidism   • Essential (primary) hypertension   • Anxiety   • Breast mass   • Breast pain   • Luetscher's syndrome   • DM II (diabetes mellitus, type II), controlled (CMS/[facility])   • Encounter for screening colonoscopy   • History of breast cancer   • Syncope   • UTI (urinary tract infection)       Social History     Social History   • Marital status:      Social History Main Topics   • Smoking status: Former Smoker     Years: 4.00   • Smokeless tobacco: Never Used   • Alcohol use Yes      Comment: rare   • Drug use: No     Other Topics Concern   • Not on file       Allergies   Allergen Reactions   • Metformin And Related GI Intolerance     Admitted x2 with lactic acidosis   • Letrozole Rash       Current Outpatient Prescriptions on File Prior to Visit   Medication Sig Dispense Refill   • ampicillin (PRINCIPEN) 500 MG capsule Take 1 capsule by mouth 3 (Three) Times a Day. 21 capsule 0   • busPIRone (BUSPAR) 5 MG tablet  "Take 1 tablet by mouth 3 (Three) Times a Day. 360 tablet 5   • Cholecalciferol (VITAMIN D3) 5000 units capsule capsule Take 1 capsule by mouth Daily. 90 capsule 1   • levothyroxine (SYNTHROID, LEVOTHROID) 150 MCG tablet Take 1 tablet by mouth Daily. 90 tablet 1   • lisinopril (PRINIVIL,ZESTRIL) 20 MG tablet Take 1 tablet by mouth Daily. 90 tablet 1   • Multiple Vitamins-Minerals (PRESERVISION AREDS 2 PO) Take  by mouth.     • ONE TOUCH ULTRA TEST test strip      • ONETOUCH DELICA LANCETS 33G misc      • SITagliptin (JANUVIA) 100 MG tablet Take 1 tablet by mouth Daily. 90 tablet 1   • dapagliflozin (FARXIGA) 5 MG tablet tablet Take 1 tablet by mouth Daily. 30 tablet 5     No current facility-administered medications on file prior to visit.        Objective   Vitals:    10/22/18 1520   BP: 148/90   BP Location: Right arm   Patient Position: Sitting   Cuff Size: Large Adult   Pulse: 98   Temp: 98.7 °F (37.1 °C)   TempSrc: Oral   SpO2: 96%   Weight: 82.6 kg (182 lb)   Height: 170.2 cm (67.01\")       Physical Exam   Constitutional: She appears well-developed and well-nourished.   HENT:   Head: Normocephalic and atraumatic.   Neck: Neck supple. No JVD present. No thyromegaly present.   Cardiovascular: Normal rate, regular rhythm and normal heart sounds.  Exam reveals no gallop and no friction rub.    No murmur heard.  Pulmonary/Chest: Effort normal and breath sounds normal. No respiratory distress. She has no wheezes. She has no rales.   Abdominal: Soft. Bowel sounds are normal. She exhibits no distension. There is no tenderness. There is no rebound and no guarding.   Musculoskeletal: She exhibits no edema.        Lumbar back: She exhibits decreased range of motion and tenderness.   Pain with interntal and external rotation (external pain SI and internal pain right groin).   Neurological: She is alert.   Skin: Skin is warm and dry.   Psychiatric: She has a normal mood and affect. Her behavior is normal.   Nursing note and " vitals reviewed.    Component      Latest Ref Rng & Units 10/16/2018   Glucose      65 - 99 mg/dL 163 (H)   BUN      8 - 23 mg/dL 11   Creatinine      0.57 - 1.00 mg/dL 0.69   eGFR Non African Amer      >60 mL/min/1.73 86   eGFR  African Amer      >60 mL/min/1.73 104   BUN/Creatinine Ratio      7.0 - 25.0 15.9   Sodium      136 - 145 mmol/L 137   Potassium      3.5 - 5.2 mmol/L 4.7   Chloride      98 - 107 mmol/L 100   Total CO2      22.0 - 29.0 mmol/L 26.8   Calcium      8.6 - 10.5 mg/dL 10.0   Total Protein      6.0 - 8.5 g/dL 6.9   Albumin      3.50 - 5.20 g/dL 4.70   Globulin      gm/dL 2.2   A/G Ratio      g/dL 2.1   Total Bilirubin      0.1 - 1.2 mg/dL 0.3   Alkaline Phosphatase      39 - 117 U/L 69   AST (SGOT)      1 - 32 U/L 10   ALT (SGPT)      1 - 33 U/L 11   TSH Baseline      0.270 - 4.200 mIU/mL 0.108 (L)     U/a trace blood, sent cx/s  Assessment/Plan   Jennie was seen today for diabetes, back pain, edema, weight gain and fatigue.    Diagnoses and all orders for this visit:    Diabetes mellitus due to underlying condition with diabetic autonomic neuropathy, without long-term current use of insulin (CMS/[facility])  -     Semaglutide (OZEMPIC) 1 MG/DOSE solution pen-injector; Inject 1 mg under the skin into the appropriate area as directed 1 (One) Time Per Week.    Other specified hypothyroidism  -     TSH  -     T4, Free  -     T3, Free    Other fatigue  -     TSH  -     T4, Free  -     T3, Free  -     CBC & Differential  -     Magnesium  -     Hemoglobin A1c    Myalgia  -     TSH  -     T4, Free  -     T3, Free  -     CBC & Differential  -     Magnesium  -     Hemoglobin A1c    Chronic pain of right hip    Acute UTI  -     cephalexin (KEFLEX) 500 MG capsule; Take 1 capsule by mouth 2 (Two) Times a Day.    cotninue januvia, avoid metformin for now.  Gave sample of ozempic to try 0.25mg weekly x 2 weeks then 0.5mg weekly;    Thyroid appears to be over replaced as TSH low, but will recheck today as symptoms  more in line with under replacement.    -back/hip pain - apap prn and heat prn  -? Recurrent UTI - will start abx;  Consider urology referral. Is not taking farxiga         -Follow up: 6 weeks and prn

## 2018-10-23 LAB
BASOPHILS # BLD AUTO: 0 X10E3/UL (ref 0–0.2)
BASOPHILS NFR BLD AUTO: 0 %
EOSINOPHIL # BLD AUTO: 0.1 X10E3/UL (ref 0–0.4)
EOSINOPHIL NFR BLD AUTO: 2 %
ERYTHROCYTE [DISTWIDTH] IN BLOOD BY AUTOMATED COUNT: 13.1 % (ref 12.3–15.4)
HBA1C MFR BLD: 6.4 % (ref 4.8–5.6)
HCT VFR BLD AUTO: 37.1 % (ref 34–46.6)
HGB BLD-MCNC: 12.4 G/DL (ref 11.1–15.9)
IMM GRANULOCYTES # BLD: 0 X10E3/UL (ref 0–0.1)
IMM GRANULOCYTES NFR BLD: 0 %
LYMPHOCYTES # BLD AUTO: 2.2 X10E3/UL (ref 0.7–3.1)
LYMPHOCYTES NFR BLD AUTO: 28 %
MAGNESIUM SERPL-MCNC: 1.9 MG/DL (ref 1.6–2.3)
MCH RBC QN AUTO: 29.2 PG (ref 26.6–33)
MCHC RBC AUTO-ENTMCNC: 33.4 G/DL (ref 31.5–35.7)
MCV RBC AUTO: 88 FL (ref 79–97)
MONOCYTES # BLD AUTO: 0.4 X10E3/UL (ref 0.1–0.9)
MONOCYTES NFR BLD AUTO: 5 %
NEUTROPHILS # BLD AUTO: 5 X10E3/UL (ref 1.4–7)
NEUTROPHILS NFR BLD AUTO: 65 %
PLATELET # BLD AUTO: 371 X10E3/UL (ref 150–379)
RBC # BLD AUTO: 4.24 X10E6/UL (ref 3.77–5.28)
T3FREE SERPL-MCNC: 2.7 PG/ML (ref 2–4.4)
T4 FREE SERPL-MCNC: 1.24 NG/DL (ref 0.82–1.77)
TSH SERPL DL<=0.005 MIU/L-ACNC: 0.09 UIU/ML (ref 0.45–4.5)
WBC # BLD AUTO: 7.8 X10E3/UL (ref 3.4–10.8)

## 2018-10-24 NOTE — PROGRESS NOTES
Call results to patient.  a1c is at goal, though has risen and so start ozempic as discussed.  Thyroid is over replaced.  Change to levothyroxine 137 mcg 1 po daily and check TSH again 6 weeks dx hypothyroidism.

## 2018-10-25 LAB
BACTERIA UR CULT: NO GROWTH
BACTERIA UR CULT: NORMAL

## 2018-10-29 RX ORDER — LEVOTHYROXINE SODIUM 137 UG/1
137 TABLET ORAL DAILY
Qty: 30 TABLET | Refills: 1 | Status: SHIPPED | OUTPATIENT
Start: 2018-10-29 | End: 2019-01-06 | Stop reason: SDUPTHER

## 2018-11-26 ENCOUNTER — OFFICE VISIT (OUTPATIENT)
Dept: FAMILY MEDICINE CLINIC | Facility: CLINIC | Age: 63
End: 2018-11-26

## 2018-11-26 VITALS
HEART RATE: 71 BPM | HEIGHT: 67 IN | SYSTOLIC BLOOD PRESSURE: 128 MMHG | OXYGEN SATURATION: 98 % | WEIGHT: 190 LBS | DIASTOLIC BLOOD PRESSURE: 70 MMHG | TEMPERATURE: 98 F | BODY MASS INDEX: 29.82 KG/M2

## 2018-11-26 DIAGNOSIS — E08.43 DIABETES MELLITUS DUE TO UNDERLYING CONDITION WITH DIABETIC AUTONOMIC NEUROPATHY, WITHOUT LONG-TERM CURRENT USE OF INSULIN (HCC): Primary | ICD-10-CM

## 2018-11-26 DIAGNOSIS — E03.8 OTHER SPECIFIED HYPOTHYROIDISM: ICD-10-CM

## 2018-11-26 DIAGNOSIS — M54.41 ACUTE RIGHT-SIDED LOW BACK PAIN WITH RIGHT-SIDED SCIATICA: ICD-10-CM

## 2018-11-26 PROCEDURE — 99214 OFFICE O/P EST MOD 30 MIN: CPT | Performed by: FAMILY MEDICINE

## 2018-11-26 RX ORDER — NAPROXEN 500 MG/1
500 TABLET ORAL 2 TIMES DAILY WITH MEALS
Qty: 30 TABLET | Refills: 0 | Status: SHIPPED | OUTPATIENT
Start: 2018-11-26 | End: 2019-05-07

## 2018-11-26 NOTE — PROGRESS NOTES
Subjective   Jennie Lofton is a 63 y.o. female. Presents today for   Chief Complaint   Patient presents with   • Follow-up     PT HERE FOR 6 WEEK FOLLOW UP     Diabetes and hypothyroidism    Diabetes   She presents for her follow-up diabetic visit. She has type 2 diabetes mellitus. Her disease course has been improving. Pertinent negatives for diabetes include no chest pain, no foot paresthesias, no foot ulcerations and no weakness. Symptoms are stable. Risk factors for coronary artery disease include diabetes mellitus. Current diabetic treatments: last ov started ozempic, tolerating now, thought nausea and doesn't want to go up. She is following a diabetic diet. An ACE inhibitor/angiotensin II receptor blocker is being taken.   Hypothyroidism   This is a chronic problem. The current episode started more than 1 year ago. The problem occurs constantly. Progression since onset: Last ov over repleaced, feels beter;  palpitions resolved. Pertinent negatives include no chest pain, numbness or weakness. Nothing aggravates the symptoms. Treatments tried: on synthroid. The treatment provided moderate relief.   Back Pain   This is a recurrent problem. The current episode started more than 1 month ago. The problem occurs constantly. The problem is unchanged. The pain is present in the lumbar spine. The quality of the pain is described as aching. The pain radiates to the right foot, right knee and right thigh. The pain is moderate. Associated symptoms include tingling (finger tips and toes, though better since thyroid med adjusted.). Pertinent negatives include no chest pain, numbness or weakness. She has tried nothing for the symptoms. The treatment provided no relief.       Review of Systems   Cardiovascular: Negative for chest pain.   Musculoskeletal: Positive for back pain.   Neurological: Positive for tingling (finger tips and toes, though better since thyroid med adjusted.). Negative for weakness and numbness.        Patient Active Problem List   Diagnosis   • Intractable vomiting with nausea   • Lactic acidosis   • Diabetes mellitus due to underlying condition with diabetic autonomic neuropathy, without long-term current use of insulin (CMS/[facility])   • Hyponatremia   • Hypothyroid   • Essential (primary) hypertension   • Anxiety   • Breast mass   • Breast pain   • Luetscher's syndrome   • DM II (diabetes mellitus, type II), controlled (CMS/[facility])   • Encounter for screening colonoscopy   • History of breast cancer   • Syncope   • UTI (urinary tract infection)       Social History     Socioeconomic History   • Marital status:      Spouse name: Not on file   • Number of children: Not on file   • Years of education: Not on file   • Highest education level: Not on file   Tobacco Use   • Smoking status: Former Smoker     Years: 4.00   • Smokeless tobacco: Never Used   Substance and Sexual Activity   • Alcohol use: Yes     Comment: rare   • Drug use: No       Allergies   Allergen Reactions   • Metformin And Related GI Intolerance     Admitted x2 with lactic acidosis   • Letrozole Rash       Current Outpatient Medications on File Prior to Visit   Medication Sig Dispense Refill   • busPIRone (BUSPAR) 5 MG tablet Take 1 tablet by mouth 3 (Three) Times a Day. 360 tablet 5   • Cholecalciferol (VITAMIN D3) 5000 units capsule capsule Take 1 capsule by mouth Daily. 90 capsule 1   • levothyroxine (SYNTHROID) 137 MCG tablet Take 1 tablet by mouth Daily. 30 tablet 1   • lisinopril (PRINIVIL,ZESTRIL) 20 MG tablet Take 1 tablet by mouth Daily. 90 tablet 1   • Multiple Vitamins-Minerals (PRESERVISION AREDS 2 PO) Take  by mouth.     • ONE TOUCH ULTRA TEST test strip      • ONETOUCH DELICA LANCETS 33G misc      • Semaglutide (OZEMPIC) 1 MG/DOSE solution pen-injector Inject 1 mg under the skin into the appropriate area as directed 1 (One) Time Per Week. 4 pen 5   • SITagliptin (JANUVIA) 100 MG tablet Take 1 tablet by mouth Daily. 90 tablet 1  "  • [DISCONTINUED] cephalexin (KEFLEX) 500 MG capsule Take 1 capsule by mouth 2 (Two) Times a Day. 14 capsule 0     No current facility-administered medications on file prior to visit.        Objective   Vitals:    11/26/18 1516   BP: 128/70   BP Location: Left arm   Patient Position: Sitting   Cuff Size: Large Adult   Pulse: 71   Temp: 98 °F (36.7 °C)   TempSrc: Oral   SpO2: 98%   Weight: 86.2 kg (190 lb)   Height: 170.2 cm (67.01\")       Physical Exam   Constitutional: She appears well-developed and well-nourished.   HENT:   Head: Normocephalic and atraumatic.   Neck: Neck supple. No JVD present. No thyromegaly present.   Cardiovascular: Normal rate, regular rhythm and normal heart sounds. Exam reveals no gallop and no friction rub.   No murmur heard.  Pulmonary/Chest: Effort normal and breath sounds normal. No respiratory distress. She has no wheezes. She has no rales.   Abdominal: Soft. Bowel sounds are normal. She exhibits no distension. There is no tenderness. There is no rebound and no guarding.   Musculoskeletal: She exhibits no edema.        Lumbar back: She exhibits tenderness and spasm.   Neurological: She is alert.   Skin: Skin is warm and dry.   Psychiatric: She has a normal mood and affect. Her behavior is normal.   Nursing note and vitals reviewed.      Assessment/Plan   Jennie was seen today for follow-up.    Diagnoses and all orders for this visit:    Diabetes mellitus due to underlying condition with diabetic autonomic neuropathy, without long-term current use of insulin (CMS/[facility])  -     Semaglutide (OZEMPIC) 0.25 or 0.5 MG/DOSE solution pen-injector; Inject 0.5 mg under the skin into the appropriate area as directed Every 7 (Seven) Days.    Other specified hypothyroidism    Acute right-sided low back pain with right-sided sciatica  -     naproxen (NAPROSYN) 500 MG tablet; Take 1 tablet by mouth 2 (Two) Times a Day With Meals.    -continue ozempic, will keep 0.5mg for now  -back pain - trial nsaid; "  If fails imaging or if worsens  -hypothyroid - recheck today;  Last check decreased to 137mcg po daily         -Follow up: 6 months and prn

## 2018-11-27 LAB — TSH SERPL DL<=0.005 MIU/L-ACNC: 1.01 UIU/ML (ref 0.45–4.5)

## 2019-01-07 RX ORDER — LEVOTHYROXINE SODIUM 137 UG/1
TABLET ORAL
Qty: 30 TABLET | Refills: 6 | Status: SHIPPED | OUTPATIENT
Start: 2019-01-07 | End: 2019-07-22 | Stop reason: SDUPTHER

## 2019-01-17 ENCOUNTER — OFFICE VISIT (OUTPATIENT)
Dept: FAMILY MEDICINE CLINIC | Facility: CLINIC | Age: 64
End: 2019-01-17

## 2019-01-17 ENCOUNTER — TELEPHONE (OUTPATIENT)
Dept: FAMILY MEDICINE CLINIC | Facility: CLINIC | Age: 64
End: 2019-01-17

## 2019-01-17 VITALS
HEART RATE: 91 BPM | BODY MASS INDEX: 29.35 KG/M2 | SYSTOLIC BLOOD PRESSURE: 124 MMHG | TEMPERATURE: 98.2 F | DIASTOLIC BLOOD PRESSURE: 70 MMHG | OXYGEN SATURATION: 96 % | WEIGHT: 187 LBS | HEIGHT: 67 IN

## 2019-01-17 DIAGNOSIS — R11.11 NON-INTRACTABLE VOMITING WITHOUT NAUSEA, UNSPECIFIED VOMITING TYPE: Primary | ICD-10-CM

## 2019-01-17 DIAGNOSIS — E87.20 LACTIC ACIDOSIS: ICD-10-CM

## 2019-01-17 PROCEDURE — 96372 THER/PROPH/DIAG INJ SC/IM: CPT | Performed by: FAMILY MEDICINE

## 2019-01-17 PROCEDURE — S0119 ONDANSETRON 4 MG: HCPCS | Performed by: FAMILY MEDICINE

## 2019-01-17 PROCEDURE — 99213 OFFICE O/P EST LOW 20 MIN: CPT | Performed by: FAMILY MEDICINE

## 2019-01-17 RX ORDER — ONDANSETRON 4 MG/1
4 TABLET, ORALLY DISINTEGRATING ORAL EVERY 8 HOURS PRN
Qty: 24 TABLET | Refills: 0 | Status: ON HOLD | OUTPATIENT
Start: 2019-01-17 | End: 2019-10-17 | Stop reason: SDUPTHER

## 2019-01-17 RX ORDER — ONDANSETRON 4 MG/1
4 TABLET, ORALLY DISINTEGRATING ORAL ONCE
Status: COMPLETED | OUTPATIENT
Start: 2019-01-17 | End: 2019-01-17

## 2019-01-17 RX ADMIN — ONDANSETRON 4 MG: 4 TABLET, ORALLY DISINTEGRATING ORAL at 12:52

## 2019-01-17 NOTE — PROGRESS NOTES
Subjective   Jennie Lofton is a 63 y.o. female. Presents today for   Chief Complaint   Patient presents with   • Vomiting   • Nausea       Vomiting    This is a new problem. The current episode started yesterday. The problem occurs intermittently. The problem has been waxing and waning. Maximum temperature: subjective fever. Associated symptoms include chills and a fever. Pertinent negatives include no abdominal pain, coughing, diarrhea, myalgias, URI or weight loss. Risk factors: no issues recently;  Has had lactic acidosis after vomiting episodes so had come in to evaluate. She has tried increased fluids for the symptoms. The treatment provided no relief.       Review of Systems   Constitutional: Positive for chills and fever. Negative for weight loss.   Respiratory: Negative for cough.    Gastrointestinal: Positive for vomiting. Negative for abdominal pain and diarrhea.   Musculoskeletal: Negative for myalgias.       Patient Active Problem List   Diagnosis   • Intractable vomiting with nausea   • Lactic acidosis   • Diabetes mellitus due to underlying condition with diabetic autonomic neuropathy, without long-term current use of insulin (CMS/[facility])   • Hyponatremia   • Hypothyroid   • Essential (primary) hypertension   • Anxiety   • Breast mass   • Breast pain   • Luetscher's syndrome   • DM II (diabetes mellitus, type II), controlled (CMS/[facility])   • Encounter for screening colonoscopy   • History of breast cancer   • Syncope   • UTI (urinary tract infection)       Social History     Socioeconomic History   • Marital status:      Spouse name: Not on file   • Number of children: Not on file   • Years of education: Not on file   • Highest education level: Not on file   Tobacco Use   • Smoking status: Former Smoker     Years: 4.00   • Smokeless tobacco: Never Used   Substance and Sexual Activity   • Alcohol use: Yes     Comment: rare   • Drug use: No       Allergies   Allergen Reactions   • Metformin And  "Related GI Intolerance     Admitted x2 with lactic acidosis   • Letrozole Rash       Current Outpatient Medications on File Prior to Visit   Medication Sig Dispense Refill   • Semaglutide (OZEMPIC) 0.25 or 0.5 MG/DOSE solution pen-injector Inject 0.5 mg under the skin into the appropriate area as directed Every 7 (Seven) Days. 1.5 pen 5   • busPIRone (BUSPAR) 5 MG tablet Take 1 tablet by mouth 3 (Three) Times a Day. 360 tablet 5   • Cholecalciferol (VITAMIN D3) 5000 units capsule capsule Take 1 capsule by mouth Daily. 90 capsule 1   • levothyroxine (SYNTHROID, LEVOTHROID) 137 MCG tablet TAKE ONE TABLET BY MOUTH ONE TIME A DAY 30 tablet 6   • lisinopril (PRINIVIL,ZESTRIL) 20 MG tablet Take 1 tablet by mouth Daily. 90 tablet 1   • Multiple Vitamins-Minerals (PRESERVISION AREDS 2 PO) Take  by mouth.     • naproxen (NAPROSYN) 500 MG tablet Take 1 tablet by mouth 2 (Two) Times a Day With Meals. 30 tablet 0   • ONE TOUCH ULTRA TEST test strip      • ONETOUCH DELICA LANCETS 33G misc      • SITagliptin (JANUVIA) 100 MG tablet Take 1 tablet by mouth Daily. 90 tablet 1     No current facility-administered medications on file prior to visit.        Objective   Vitals:    01/17/19 1220   BP: 124/70   BP Location: Left arm   Patient Position: Sitting   Cuff Size: Adult   Pulse: 91   Temp: 98.2 °F (36.8 °C)   TempSrc: Oral   SpO2: 96%   Weight: 84.8 kg (187 lb)   Height: 170.2 cm (67\")       Physical Exam   Constitutional: She appears well-developed and well-nourished.   HENT:   Head: Normocephalic and atraumatic.   Neck: Neck supple. No JVD present. No thyromegaly present.   Cardiovascular: Normal rate, regular rhythm and normal heart sounds. Exam reveals no gallop and no friction rub.   No murmur heard.  Pulmonary/Chest: Effort normal and breath sounds normal. No respiratory distress. She has no wheezes. She has no rales.   Abdominal: Soft. Bowel sounds are normal. She exhibits no distension. There is no tenderness. There is no " rebound and no guarding.   Musculoskeletal: She exhibits no edema.   Neurological: She is alert.   Skin: Skin is warm and dry.   Psychiatric: She has a normal mood and affect. Her behavior is normal.   Nursing note and vitals reviewed.      Assessment/Plan   Jennie was seen today for vomiting and nausea.    Diagnoses and all orders for this visit:    Non-intractable vomiting without nausea, unspecified vomiting type  -     ondansetron ODT (ZOFRAN-ODT) 4 MG disintegrating tablet; Take 1 tablet by mouth Every 8 (Eight) Hours As Needed for Nausea or Vomiting.  -     Comprehensive Metabolic Panel  -     CBC & Differential  -     Lactic Acid, Plasma  -     ondansetron ODT (ZOFRAN-ODT) disintegrating tablet 4 mg; Take 1 tablet by mouth 1 (One) Time.    Lactic acidosis  -     Comprehensive Metabolic Panel  -     CBC & Differential  -     Lactic Acid, Plasma        Had 2 episodes of lactic acidosis in past, so will recheck.  Is no longer on metofrin;  If intractable n/v recurs, directed go ER immeidately.  Gave zofran in office, ok continue.  Push fluids, clear liquid diet and advance as tolerated.       -Follow up:  Prn - RTC if worse or no improvement.

## 2019-01-18 LAB
ALBUMIN SERPL-MCNC: 4.5 G/DL (ref 3.6–4.8)
ALBUMIN/GLOB SERPL: 1.8 {RATIO} (ref 1.2–2.2)
ALP SERPL-CCNC: 60 IU/L (ref 39–117)
ALT SERPL-CCNC: 9 IU/L (ref 0–32)
AST SERPL-CCNC: 12 IU/L (ref 0–40)
BASOPHILS # BLD AUTO: 0 X10E3/UL (ref 0–0.2)
BASOPHILS NFR BLD AUTO: 0 %
BILIRUB SERPL-MCNC: 0.4 MG/DL (ref 0–1.2)
BUN SERPL-MCNC: 10 MG/DL (ref 8–27)
BUN/CREAT SERPL: 14 (ref 12–28)
CALCIUM SERPL-MCNC: 9.3 MG/DL (ref 8.7–10.3)
CHLORIDE SERPL-SCNC: 94 MMOL/L (ref 96–106)
CO2 SERPL-SCNC: 25 MMOL/L (ref 20–29)
CREAT SERPL-MCNC: 0.7 MG/DL (ref 0.57–1)
EOSINOPHIL # BLD AUTO: 0 X10E3/UL (ref 0–0.4)
EOSINOPHIL NFR BLD AUTO: 0 %
ERYTHROCYTE [DISTWIDTH] IN BLOOD BY AUTOMATED COUNT: 13.8 % (ref 12.3–15.4)
GLOBULIN SER CALC-MCNC: 2.5 G/DL (ref 1.5–4.5)
GLUCOSE SERPL-MCNC: 111 MG/DL (ref 65–99)
HCT VFR BLD AUTO: 40.9 % (ref 34–46.6)
HGB BLD-MCNC: 14 G/DL (ref 11.1–15.9)
IMM GRANULOCYTES # BLD AUTO: 0 X10E3/UL (ref 0–0.1)
IMM GRANULOCYTES NFR BLD AUTO: 0 %
LACTATE SERPL-MCNC: NORMAL MG/DL
LYMPHOCYTES # BLD AUTO: 2.4 X10E3/UL (ref 0.7–3.1)
LYMPHOCYTES NFR BLD AUTO: 29 %
MCH RBC QN AUTO: 29.5 PG (ref 26.6–33)
MCHC RBC AUTO-ENTMCNC: 34.2 G/DL (ref 31.5–35.7)
MCV RBC AUTO: 86 FL (ref 79–97)
MONOCYTES # BLD AUTO: 0.3 X10E3/UL (ref 0.1–0.9)
MONOCYTES NFR BLD AUTO: 3 %
NEUTROPHILS # BLD AUTO: 5.4 X10E3/UL (ref 1.4–7)
NEUTROPHILS NFR BLD AUTO: 68 %
PLATELET # BLD AUTO: 353 X10E3/UL (ref 150–379)
POTASSIUM SERPL-SCNC: 4.1 MMOL/L (ref 3.5–5.2)
PROT SERPL-MCNC: 7 G/DL (ref 6–8.5)
RBC # BLD AUTO: 4.75 X10E6/UL (ref 3.77–5.28)
REQUEST PROBLEM: NORMAL
SODIUM SERPL-SCNC: 136 MMOL/L (ref 134–144)
WBC # BLD AUTO: 8 X10E3/UL (ref 3.4–10.8)

## 2019-01-21 ENCOUNTER — TELEPHONE (OUTPATIENT)
Dept: FAMILY MEDICINE CLINIC | Facility: CLINIC | Age: 64
End: 2019-01-21

## 2019-01-21 NOTE — TELEPHONE ENCOUNTER
See labs normal except lactic acid was not done, not enough blood per [facility].  How is she feeling now jm

## 2019-01-21 NOTE — PROGRESS NOTES
Call results to patient.  They didn't collect enough blood to check lactic acid level.  However, an indirect measure for acidosis was normal.   Is she feeling better?

## 2019-04-12 RX ORDER — SITAGLIPTIN 100 MG/1
TABLET, FILM COATED ORAL
Qty: 90 TABLET | Refills: 0 | Status: SHIPPED | OUTPATIENT
Start: 2019-04-12 | End: 2019-07-12 | Stop reason: SDUPTHER

## 2019-05-07 ENCOUNTER — OFFICE VISIT (OUTPATIENT)
Dept: FAMILY MEDICINE CLINIC | Facility: CLINIC | Age: 64
End: 2019-05-07

## 2019-05-07 VITALS
WEIGHT: 192 LBS | TEMPERATURE: 98.2 F | BODY MASS INDEX: 30.07 KG/M2 | DIASTOLIC BLOOD PRESSURE: 70 MMHG | HEART RATE: 90 BPM | OXYGEN SATURATION: 97 % | SYSTOLIC BLOOD PRESSURE: 122 MMHG

## 2019-05-07 DIAGNOSIS — E11.9 TYPE 2 DIABETES MELLITUS WITHOUT COMPLICATION, WITHOUT LONG-TERM CURRENT USE OF INSULIN (HCC): Primary | ICD-10-CM

## 2019-05-07 DIAGNOSIS — M54.42 CHRONIC BILATERAL LOW BACK PAIN WITH BILATERAL SCIATICA: ICD-10-CM

## 2019-05-07 DIAGNOSIS — M25.559 ARTHRALGIA OF HIP, UNSPECIFIED LATERALITY: ICD-10-CM

## 2019-05-07 DIAGNOSIS — G89.29 CHRONIC BILATERAL LOW BACK PAIN WITH BILATERAL SCIATICA: ICD-10-CM

## 2019-05-07 DIAGNOSIS — M54.41 CHRONIC BILATERAL LOW BACK PAIN WITH BILATERAL SCIATICA: ICD-10-CM

## 2019-05-07 PROCEDURE — 99213 OFFICE O/P EST LOW 20 MIN: CPT | Performed by: FAMILY MEDICINE

## 2019-05-07 RX ORDER — MELOXICAM 7.5 MG/1
7.5 TABLET ORAL DAILY PRN
Qty: 30 TABLET | Refills: 5 | Status: SHIPPED | OUTPATIENT
Start: 2019-05-07 | End: 2019-07-15 | Stop reason: SDUPTHER

## 2019-05-07 RX ORDER — GLIPIZIDE 5 MG/1
5 TABLET, FILM COATED, EXTENDED RELEASE ORAL DAILY
Qty: 30 TABLET | Refills: 5 | Status: SHIPPED | OUTPATIENT
Start: 2019-05-07 | End: 2019-10-25 | Stop reason: SDUPTHER

## 2019-05-07 RX ORDER — LANCETS 28 GAUGE
EACH MISCELLANEOUS
Qty: 50 EACH | Refills: 12 | Status: SHIPPED | OUTPATIENT
Start: 2019-05-07 | End: 2019-05-09 | Stop reason: CLARIF

## 2019-05-07 NOTE — PROGRESS NOTES
Subjective   Jennie Lofton is a 64 y.o. female. Presents today for   Chief Complaint   Patient presents with   • Diabetes     blood sugars are not controlled.  Back pain is severe now and was taking some aleve.       Diabetes   She presents for her follow-up diabetic visit. She has type 2 diabetes mellitus. Her disease course has been worsening. Pertinent negatives for diabetes include no chest pain and no foot ulcerations. (INtolerant of ozempic so stopped.  Didn't tolerate farxiga.) Risk factors for coronary artery disease include post-menopausal, hypertension and diabetes mellitus. Current diabetic treatment includes oral agent (monotherapy). She is following a generally unhealthy diet. An ACE inhibitor/angiotensin II receptor blocker is being taken.     Still chronic back and right hip pain;  Aching frequently and limping;  Intolerant of higher dose naproxen but tolerates alleves which helps some.    Review of Systems   Respiratory: Negative for shortness of breath.    Cardiovascular: Negative for chest pain.   Musculoskeletal: Positive for arthralgias and back pain.       Patient Active Problem List   Diagnosis   • Intractable vomiting with nausea   • Lactic acidosis   • Diabetes mellitus due to underlying condition with diabetic autonomic neuropathy, without long-term current use of insulin (CMS/[facility])   • Hyponatremia   • Hypothyroid   • Essential (primary) hypertension   • Anxiety   • Breast mass   • Breast pain   • Luetscher's syndrome   • DM II (diabetes mellitus, type II), controlled (CMS/HCC)   • Encounter for screening colonoscopy   • History of breast cancer   • Syncope   • UTI (urinary tract infection)       Social History     Socioeconomic History   • Marital status:      Spouse name: Not on file   • Number of children: Not on file   • Years of education: Not on file   • Highest education level: Not on file   Tobacco Use   • Smoking status: Former Smoker     Years: 4.00   • Smokeless tobacco:  Never Used   Substance and Sexual Activity   • Alcohol use: Yes     Comment: rare   • Drug use: No       Allergies   Allergen Reactions   • Metformin And Related GI Intolerance     Admitted x2 with lactic acidosis   • Letrozole Rash       Current Outpatient Medications on File Prior to Visit   Medication Sig Dispense Refill   • Cholecalciferol (VITAMIN D3) 5000 units capsule capsule TAKE ONE CAPSULE BY MOUTH DAILY 90 capsule 0   • JANUVIA 100 MG tablet TAKE ONE TABLET BY MOUTH DAILY 90 tablet 0   • levothyroxine (SYNTHROID, LEVOTHROID) 137 MCG tablet TAKE ONE TABLET BY MOUTH ONE TIME A DAY 30 tablet 6   • lisinopril (PRINIVIL,ZESTRIL) 20 MG tablet Take 1 tablet by mouth Daily. 90 tablet 1   • Multiple Vitamins-Minerals (PRESERVISION AREDS 2 PO) Take  by mouth.     • ondansetron ODT (ZOFRAN-ODT) 4 MG disintegrating tablet Take 1 tablet by mouth Every 8 (Eight) Hours As Needed for Nausea or Vomiting. 24 tablet 0   • ONE TOUCH ULTRA TEST test strip      • ONETOUCH DELICA LANCETS 33G misc      • [DISCONTINUED] busPIRone (BUSPAR) 5 MG tablet Take 1 tablet by mouth 3 (Three) Times a Day. 360 tablet 5   • [DISCONTINUED] naproxen (NAPROSYN) 500 MG tablet Take 1 tablet by mouth 2 (Two) Times a Day With Meals. 30 tablet 0   • [DISCONTINUED] Semaglutide (OZEMPIC) 0.25 or 0.5 MG/DOSE solution pen-injector Inject 0.5 mg under the skin into the appropriate area as directed Every 7 (Seven) Days. 1.5 pen 5     No current facility-administered medications on file prior to visit.        Objective   Vitals:    05/07/19 1448   BP: 122/70   Pulse: 90   Temp: 98.2 °F (36.8 °C)   SpO2: 97%   Weight: 87.1 kg (192 lb)       Physical Exam   Constitutional: She is oriented to person, place, and time. She appears well-developed and well-nourished.   Neurological: She is alert and oriented to person, place, and time.   Skin: Skin is warm and dry.   Psychiatric: She has a normal mood and affect. Her behavior is normal.   Nursing note and vitals  reviewed.      Assessment/Plan   Jennie was seen today for diabetes.    Diagnoses and all orders for this visit:    Type 2 diabetes mellitus without complication, without long-term current use of insulin (CMS/[facility])  -     glipiZIDE (GLUCOTROL XL) 5 MG ER tablet; Take 1 tablet by mouth Daily.    Arthralgia of hip, unspecified laterality  -     meloxicam (MOBIC) 7.5 MG tablet; Take 1 tablet by mouth Daily As Needed (back or hip pain).    Chronic bilateral low back pain with bilateral sciatica  -     meloxicam (MOBIC) 7.5 MG tablet; Take 1 tablet by mouth Daily As Needed (back or hip pain).    -counseled on diet and exercise to improve blood sugar;  Do not skip meals on glipizide  -will try meloxicam and cmp crm for pain;  Refer to PT, prefers when school out.         -Follow up: 2 months and prn

## 2019-05-21 DIAGNOSIS — I10 ESSENTIAL (PRIMARY) HYPERTENSION: ICD-10-CM

## 2019-05-21 DIAGNOSIS — E11.9 TYPE 2 DIABETES MELLITUS WITHOUT COMPLICATION, WITHOUT LONG-TERM CURRENT USE OF INSULIN (HCC): ICD-10-CM

## 2019-05-21 RX ORDER — LISINOPRIL 20 MG/1
20 TABLET ORAL DAILY
Qty: 90 TABLET | Refills: 1 | Status: SHIPPED | OUTPATIENT
Start: 2019-05-21 | End: 2019-11-01 | Stop reason: SDUPTHER

## 2019-07-12 RX ORDER — SITAGLIPTIN 100 MG/1
TABLET, FILM COATED ORAL
Qty: 90 TABLET | Refills: 0 | Status: SHIPPED | OUTPATIENT
Start: 2019-07-12 | End: 2019-10-03

## 2019-07-15 ENCOUNTER — OFFICE VISIT (OUTPATIENT)
Dept: FAMILY MEDICINE CLINIC | Facility: CLINIC | Age: 64
End: 2019-07-15

## 2019-07-15 VITALS
TEMPERATURE: 98.7 F | WEIGHT: 195 LBS | BODY MASS INDEX: 30.54 KG/M2 | SYSTOLIC BLOOD PRESSURE: 138 MMHG | HEART RATE: 88 BPM | OXYGEN SATURATION: 98 % | DIASTOLIC BLOOD PRESSURE: 70 MMHG

## 2019-07-15 DIAGNOSIS — I10 ESSENTIAL (PRIMARY) HYPERTENSION: ICD-10-CM

## 2019-07-15 DIAGNOSIS — M25.559 ARTHRALGIA OF HIP, UNSPECIFIED LATERALITY: ICD-10-CM

## 2019-07-15 DIAGNOSIS — E03.8 OTHER SPECIFIED HYPOTHYROIDISM: ICD-10-CM

## 2019-07-15 DIAGNOSIS — E08.43 DIABETES MELLITUS DUE TO UNDERLYING CONDITION WITH DIABETIC AUTONOMIC NEUROPATHY, WITHOUT LONG-TERM CURRENT USE OF INSULIN (HCC): ICD-10-CM

## 2019-07-15 DIAGNOSIS — M16.11 PRIMARY OSTEOARTHRITIS OF RIGHT HIP: Primary | ICD-10-CM

## 2019-07-15 PROCEDURE — 99214 OFFICE O/P EST MOD 30 MIN: CPT | Performed by: FAMILY MEDICINE

## 2019-07-15 RX ORDER — MELOXICAM 15 MG/1
15 TABLET ORAL DAILY PRN
Qty: 30 TABLET | Refills: 5 | Status: SHIPPED | OUTPATIENT
Start: 2019-07-15 | End: 2019-10-03

## 2019-07-15 NOTE — PROGRESS NOTES
Subjective   Jennie Lofton is a 64 y.o. female. Presents today for   Chief Complaint   Patient presents with   • Hip Pain     rt hip pain x several months getting worse and radiating into groin   • Diabetes   • Hypertension   • Hypothyroidism       Patient severe hip pain, if is cause of pain and candidate desires JAIME as failed all conservative measures;  Has had prior general anesthetic with no issues in prior surgyer;  No prior hx of AMI/CVA;  No history of blood clots;  No hx of sleep apnea;  Is due for chronic disease mgmt today as well.    Lower Extremity Issue   This is a chronic problem. The current episode started more than 1 month ago. The problem occurs constantly. The problem has been unchanged. Associated symptoms include arthralgias. Pertinent negatives include no chest pain or joint swelling. Associated symptoms comments: Was able pull up images and AP view of hip partial view note severe DJD on right. The symptoms are aggravated by walking. She has tried NSAIDs (chiropractor no relief, had xray there and bone on bone right hip, directed here to evaluation) for the symptoms. The treatment provided no relief (NSAIDS no longer relief).   Diabetes   She presents for her follow-up diabetic visit. She has type 2 diabetes mellitus. Her disease course has been stable. Pertinent negatives for diabetes include no chest pain, no foot paresthesias and no foot ulcerations. Symptoms are stable. Pertinent negatives for diabetic complications include no CVA or PVD. Risk factors for coronary artery disease include diabetes mellitus, hypertension and post-menopausal. Current diabetic treatments: oral agents. An ACE inhibitor/angiotensin II receptor blocker is being taken.   Hypertension   This is a chronic problem. The current episode started more than 1 year ago. The problem is controlled. Associated symptoms include peripheral edema (left > right swelling if on feet for long periods). Pertinent negatives include no  chest pain, orthopnea, palpitations, PND or shortness of breath. Past treatments include ACE inhibitors. Current antihypertension treatment includes ACE inhibitors. The current treatment provides moderate improvement. There is no history of kidney disease, CAD/MI, CVA, heart failure or PVD. There is no history of sleep apnea or a thyroid problem.   Hypothyroidism   This is a chronic problem. The problem occurs constantly. The problem has been unchanged. Associated symptoms include arthralgias. Pertinent negatives include no chest pain or joint swelling. Treatments tried: on levothyroxine.       Review of Systems   Respiratory: Negative for shortness of breath.    Cardiovascular: Negative for chest pain, palpitations, orthopnea and PND.   Musculoskeletal: Positive for arthralgias. Negative for joint swelling.       Patient Active Problem List   Diagnosis   • Intractable vomiting with nausea   • Lactic acidosis   • Diabetes mellitus due to underlying condition with diabetic autonomic neuropathy, without long-term current use of insulin (CMS/HCC)   • Hyponatremia   • Hypothyroid   • Essential (primary) hypertension   • Anxiety   • Breast mass   • Breast pain   • Luetscher's syndrome   • DM II (diabetes mellitus, type II), controlled (CMS/HCC)   • Encounter for screening colonoscopy   • History of breast cancer   • Syncope   • UTI (urinary tract infection)     Past Medical History:   Diagnosis Date   • Breast cancer (CMS/HCC)    • Diabetes mellitus (CMS/HCC)    • Hypertension    • Hypothyroid      Past Surgical History:   Procedure Laterality Date   • BREAST LUMPECTOMY      right 2012     Family History   Problem Relation Age of Onset   • Diabetes Mother    • Diabetes Sister        Social History     Socioeconomic History   • Marital status:      Spouse name: Not on file   • Number of children: Not on file   • Years of education: Not on file   • Highest education level: Not on file   Tobacco Use   • Smoking  status: Former Smoker     Years: 4.00   • Smokeless tobacco: Never Used   Substance and Sexual Activity   • Alcohol use: Yes     Comment: rare   • Drug use: No       Allergies   Allergen Reactions   • Metformin And Related GI Intolerance     Admitted x2 with lactic acidosis   • Letrozole Rash       Current Outpatient Medications on File Prior to Visit   Medication Sig Dispense Refill   • Cholecalciferol (VITAMIN D3) 5000 units capsule capsule TAKE ONE CAPSULE BY MOUTH DAILY 90 capsule 0   • glipiZIDE (GLUCOTROL XL) 5 MG ER tablet Take 1 tablet by mouth Daily. 30 tablet 5   • glucose blood (ACCU-CHEK JASSI PLUS) test strip Use as instructed test once daily dx e11.9 100 each 1   • JANUVIA 100 MG tablet TAKE ONE TABLET BY MOUTH DAILY 90 tablet 0   • Lancets (ACCU-CHEK SOFT TOUCH) lancets Test once daily 100 each 1   • levothyroxine (SYNTHROID, LEVOTHROID) 137 MCG tablet TAKE ONE TABLET BY MOUTH ONE TIME A DAY 30 tablet 6   • lisinopril (PRINIVIL,ZESTRIL) 20 MG tablet Take 1 tablet by mouth Daily. 90 tablet 1   • Multiple Vitamins-Minerals (PRESERVISION AREDS 2 PO) Take  by mouth.     • ondansetron ODT (ZOFRAN-ODT) 4 MG disintegrating tablet Take 1 tablet by mouth Every 8 (Eight) Hours As Needed for Nausea or Vomiting. 24 tablet 0   • [DISCONTINUED] meloxicam (MOBIC) 7.5 MG tablet Take 1 tablet by mouth Daily As Needed (back or hip pain). 30 tablet 5     No current facility-administered medications on file prior to visit.        Objective   Vitals:    07/15/19 1127   BP: 138/70   Pulse: 88   Temp: 98.7 °F (37.1 °C)   SpO2: 98%   Weight: 88.5 kg (195 lb)  Comment: per pt jm       Physical Exam   Constitutional: She is oriented to person, place, and time. She appears well-developed and well-nourished.   HENT:   Head: Normocephalic and atraumatic.   Neck: Neck supple. No JVD present. No thyromegaly present.   Cardiovascular: Normal rate, regular rhythm and normal heart sounds. Exam reveals no gallop and no friction rub.    No murmur heard.  Pulmonary/Chest: Effort normal and breath sounds normal. No respiratory distress. She has no wheezes. She has no rales.   Abdominal: Soft. Bowel sounds are normal. She exhibits no distension. There is no tenderness. There is no rebound and no guarding.   Musculoskeletal: She exhibits no edema.        Right hip: She exhibits decreased range of motion and tenderness.   Neurological: She is alert and oriented to person, place, and time. Gait abnormal.   Skin: Skin is warm and dry.   Psychiatric: She has a normal mood and affect. Her behavior is normal.   Nursing note and vitals reviewed.      Assessment/Plan   Jennie was seen today for hip pain, diabetes, hypertension and hypothyroidism.    Diagnoses and all orders for this visit:    Primary osteoarthritis of right hip  -     Ambulatory Referral to Orthopedic Surgery    Diabetes mellitus due to underlying condition with diabetic autonomic neuropathy, without long-term current use of insulin (CMS/[facility])  -     Comprehensive Metabolic Panel  -     Hemoglobin A1c  -     Lipid Panel    Essential (primary) hypertension  -     Comprehensive Metabolic Panel  -     Hemoglobin A1c  -     Lipid Panel    Other specified hypothyroidism  -     Comprehensive Metabolic Panel  -     Hemoglobin A1c  -     Lipid Panel  -     TSH    Arthralgia of hip, unspecified laterality  -     meloxicam (MOBIC) 15 MG tablet; Take 1 tablet by mouth Daily As Needed (hip pain).    -dm2 -check labs as due  -hypertension - controlled, continue medications  -hypothyroidism - continue medication, recheck thyroid labs.  -will increase meloxicam to 15mg po daily and see if helps;  Refer to ortho for evaluation of hip pathology;  Patient no longer functional, has failed therapy and nsaids.     -Follow up: 3 months and prn    August 21, 2019 ADDENDUM:  The patient wishes to proceed with joint replacement.  She has Type 2 diabetes and arterial hypertension both of which have been adequately  controlled.  She is medically cleared for surgery.  Please forward preop studies when completed.

## 2019-07-16 LAB
ALBUMIN SERPL-MCNC: 5.1 G/DL (ref 3.5–5.2)
ALBUMIN/GLOB SERPL: 2.6 G/DL
ALP SERPL-CCNC: 66 U/L (ref 39–117)
ALT SERPL-CCNC: 10 U/L (ref 1–33)
AST SERPL-CCNC: 12 U/L (ref 1–32)
BILIRUB SERPL-MCNC: 0.4 MG/DL (ref 0.2–1.2)
BUN SERPL-MCNC: 12 MG/DL (ref 8–23)
BUN/CREAT SERPL: 15.6 (ref 7–25)
CALCIUM SERPL-MCNC: 9.5 MG/DL (ref 8.6–10.5)
CHLORIDE SERPL-SCNC: 98 MMOL/L (ref 98–107)
CHOLEST SERPL-MCNC: 244 MG/DL (ref 0–200)
CO2 SERPL-SCNC: 23.1 MMOL/L (ref 22–29)
CREAT SERPL-MCNC: 0.77 MG/DL (ref 0.57–1)
GLOBULIN SER CALC-MCNC: 2 GM/DL
GLUCOSE SERPL-MCNC: 114 MG/DL (ref 65–99)
HBA1C MFR BLD: 7.1 % (ref 4.8–5.6)
HDLC SERPL-MCNC: 58 MG/DL (ref 40–60)
LDLC SERPL CALC-MCNC: 158 MG/DL (ref 0–100)
POTASSIUM SERPL-SCNC: 4.5 MMOL/L (ref 3.5–5.2)
PROT SERPL-MCNC: 7.1 G/DL (ref 6–8.5)
SODIUM SERPL-SCNC: 134 MMOL/L (ref 136–145)
TRIGL SERPL-MCNC: 138 MG/DL (ref 0–150)
TSH SERPL DL<=0.005 MIU/L-ACNC: 7.93 MIU/ML (ref 0.27–4.2)
VLDLC SERPL CALC-MCNC: 27.6 MG/DL

## 2019-07-18 NOTE — PROGRESS NOTES
Diabetes is adequately controlled.  A1c Has risen some.  Work on diet.  Cholesterol is high, work on diet.  Thyroid underactive, any missed doses?  Rest of labs normal or stable.

## 2019-07-22 ENCOUNTER — TELEPHONE (OUTPATIENT)
Dept: FAMILY MEDICINE CLINIC | Facility: CLINIC | Age: 64
End: 2019-07-22

## 2019-07-22 DIAGNOSIS — E03.8 OTHER SPECIFIED HYPOTHYROIDISM: Primary | ICD-10-CM

## 2019-07-22 RX ORDER — LEVOTHYROXINE SODIUM 0.15 MG/1
150 TABLET ORAL DAILY
Qty: 30 TABLET | Refills: 5 | Status: SHIPPED | OUTPATIENT
Start: 2019-07-22 | End: 2020-01-22 | Stop reason: SDUPTHER

## 2019-07-22 NOTE — TELEPHONE ENCOUNTER
I am increasing levothyroxine to 150mcg po daily and recheck TSH in 6 weeks.  I ordered and sent medication    ----- Message from Melody Valera MA sent at 7/18/2019 12:13 PM EDT -----  Per pt, she has not missed any thyroid doses.     She is aware of her results.

## 2019-08-02 ENCOUNTER — OFFICE VISIT (OUTPATIENT)
Dept: ORTHOPEDIC SURGERY | Facility: CLINIC | Age: 64
End: 2019-08-02

## 2019-08-02 VITALS — BODY MASS INDEX: 30.61 KG/M2 | HEIGHT: 67 IN | TEMPERATURE: 97.4 F | WEIGHT: 195 LBS

## 2019-08-02 DIAGNOSIS — M16.11 ARTHRITIS OF RIGHT HIP: Primary | ICD-10-CM

## 2019-08-02 PROCEDURE — 99204 OFFICE O/P NEW MOD 45 MIN: CPT | Performed by: ORTHOPAEDIC SURGERY

## 2019-08-02 PROCEDURE — 73502 X-RAY EXAM HIP UNI 2-3 VIEWS: CPT | Performed by: ORTHOPAEDIC SURGERY

## 2019-08-02 NOTE — PROGRESS NOTES
"Patient Name: Jennie Lofton   YOB: 1955  Referring Primary Care Physician: Frank Saenz DO  BMI: Body mass index is 30.54 kg/m².    Chief Complaint:    Chief Complaint   Patient presents with   • Right Hip - Pain        HPI:     Jennie Lofton is a 64 y.o. female who presents today for evaluation of   Chief Complaint   Patient presents with   • Right Hip - Pain   .  Chief complaint is right hip pain is been going on for \"a while\" she had problems in her back and her right hip.  She is tried chiropractic it did not help they took x-rays and suggested she see an orthopedic.  He works at [employer] as an .  She is tried Aleve and she is currently on a walker.  She does have some diabetes but her last A1c after reviewing her records is 7.1 pain is affecting her activities of daily living and making it hard to walk and get around.  Achy gnawing pain that is not easily relieved    This problem is new to this examiner.     Subjective   Medications:   Home Medications:  Current Outpatient Medications on File Prior to Visit   Medication Sig   • Cholecalciferol (VITAMIN D3) 5000 units capsule capsule TAKE ONE CAPSULE BY MOUTH DAILY   • glipiZIDE (GLUCOTROL XL) 5 MG ER tablet Take 1 tablet by mouth Daily.   • glucose blood (ACCU-CHEK JASSI PLUS) test strip Use as instructed test once daily dx e11.9   • JANUVIA 100 MG tablet TAKE ONE TABLET BY MOUTH DAILY   • Lancets (ACCU-CHEK SOFT TOUCH) lancets Test once daily   • levothyroxine (SYNTHROID, LEVOTHROID) 150 MCG tablet Take 1 tablet by mouth Daily.   • lisinopril (PRINIVIL,ZESTRIL) 20 MG tablet Take 1 tablet by mouth Daily.   • Multiple Vitamins-Minerals (PRESERVISION AREDS 2 PO) Take  by mouth.   • meloxicam (MOBIC) 15 MG tablet Take 1 tablet by mouth Daily As Needed (hip pain).   • ondansetron ODT (ZOFRAN-ODT) 4 MG disintegrating tablet Take 1 tablet by mouth Every 8 (Eight) Hours As Needed for Nausea or Vomiting.     No " current facility-administered medications on file prior to visit.      Current Medications:  Scheduled Meds:  Continuous Infusions:  No current facility-administered medications for this visit.   PRN Meds:.    I have reviewed the patient's medical history in detail and updated the computerized patient record.  Review and summarization of old records includes:    Past Medical History:   Diagnosis Date   • Breast cancer (CMS/HCC)    • Diabetes mellitus (CMS/HCC)    • Hypertension    • Hypothyroid         Past Surgical History:   Procedure Laterality Date   • BREAST LUMPECTOMY      right 2012        Social History     Occupational History   • Not on file   Tobacco Use   • Smoking status: Former Smoker     Years: 4.00   • Smokeless tobacco: Never Used   Substance and Sexual Activity   • Alcohol use: Yes     Comment: rare   • Drug use: No   • Sexual activity: Defer      Social History     Social History Narrative   • Not on file        Family History   Problem Relation Age of Onset   • Diabetes Mother    • Diabetes Sister    • Skin cancer Father        ROS: 14 point review of systems was performed and all other systems were reviewed and are negative except for documented findings in HPI and today's encounter.     Allergies:   Allergies   Allergen Reactions   • Metformin And Related GI Intolerance     Admitted x2 with lactic acidosis   • Codeine Anxiety, Dizziness and Nausea Only   • Letrozole Rash   • Naproxen Anxiety and Dizziness     Constitutional:  Denies fever, shaking or chills   Eyes:  Denies change in visual acuity   HENT:  Denies nasal congestion or sore throat   Respiratory:  Denies cough or shortness of breath   Cardiovascular:  Denies chest pain or severe LE edema   GI:  Denies abdominal pain, nausea, vomiting, bloody stools or diarrhea   Musculoskeletal:  Numbness, tingling, pain, or loss of motor function only as noted above in history of present illness.  : Denies painful urination or  "hematuria  Integument:  Denies rash, lesion or ulceration   Neurologic:  Denies headache or focal weakness  Endocrine:  Denies lymphadenopathy  Psych:  Denies confusion or change in mental status   Hem:  Denies active bleeding    OBJECTIVE:  Physical Exam:   Temp 97.4 °F (36.3 °C)   Ht 170.2 cm (67\")   Wt 88.5 kg (195 lb)   BMI 30.54 kg/m²     General Appearance:    Alert, cooperative, in no acute distress                  Eyes: conjunctiva clear  ENT: external ears and nose atraumatic  CV: no peripheral edema  Resp: normal respiratory effort  Skin: no rashes or wounds; normal turgor  Psych: mood and affect appropriate  Lymph: no nodes appreciated  Neuro: gross sensation intact  Vascular:  Palpable peripheral pulse in noted extremity  Musculoskeletal Extremities: M today shows very little internal/external rotation Stinchfield's markedly positive and abduction is limited    Radiology:   AP lateral right hip taken the office today and compared to an old single view x-ray from a chiropractor's office show advanced end-stage osteoarthritis of the right hip.  She also has some scoliosis in the lumbar spine.    Assessment:     ICD-10-CM ICD-9-CM   1. Arthritis of right hip M16.11 716.95   Degenerative e disease lumbar spine     Procedures       Plan: Biomechanics of pertinent body area discussed.  Risks, benefits, alternatives, comparisons, and complications of accepted medicines, injections, recommendations, surgical procedures, and therapies explained and education provided in laymen's terms. Natural history and expected course of this patient's diagnosis discussed along with evaluation of therapies. Questions answered. When appropriate I also discussed proper use of cane, walker, trekking poles.   JAIME: Obtain medical clearance for surgery. Continuation of conservative management vs. JAIME discussed.  I reviewed anatomy of a total hip arthroplasty in laymen's terms, as well as typical postoperative recovery and " possibly 6-12 months for maximal recovery, and possible need for rehabilitation stay after hospitalization. We also discussed risks, benefits, alternatives, and limitations of procedure with risks including but not limited to neurovascular damage, bleeding, infection, malalignment, chronic pian, failure of implants, periprosthetic fracture, osteolysis, loosening of implants, loss of motion, weakness, stiffness, instability, DVT, pulmonary embolus, death, stroke, complex regional pain syndrome, myocardial infarction, and need for additional procedures. Concept of substitution vs. replacement discussed.  No guarantees were given regarding results of surgery.  Patient verbalized understanding, and was given the opportunity to ask and have all questions answered today.       8/2/2019    Much of this encounter note is an electronic transcription/translation of spoken language to printed text. The electronic translation of spoken language may permit erroneous, or at times, nonsensical words or phrases to be inadvertently transcribed; Although I have reviewed the note for such errors, some may still exist

## 2019-08-07 PROBLEM — M16.11 ARTHRITIS OF RIGHT HIP: Status: ACTIVE | Noted: 2019-08-07

## 2019-08-21 ENCOUNTER — TELEPHONE (OUTPATIENT)
Dept: FAMILY MEDICINE CLINIC | Facility: CLINIC | Age: 64
End: 2019-08-21

## 2019-08-21 NOTE — TELEPHONE ENCOUNTER
I placed addendum on my last ov clearing for surgery.  Go ahead and print, I will sign then can fax.  RRJ

## 2019-08-28 ENCOUNTER — TELEPHONE (OUTPATIENT)
Dept: ORTHOPEDIC SURGERY | Facility: CLINIC | Age: 64
End: 2019-08-28

## 2019-08-28 NOTE — TELEPHONE ENCOUNTER
Yes that would be fine, we could also do limited hours if she would prefer this.  Lets go according to her preference.

## 2019-09-11 ENCOUNTER — TELEPHONE (OUTPATIENT)
Dept: ORTHOPEDIC SURGERY | Facility: CLINIC | Age: 64
End: 2019-09-11

## 2019-09-11 NOTE — TELEPHONE ENCOUNTER
Talk with the patient.  She has never had any official metal allergy testing although does have trouble with watch bands and snaps on clothing causing a rash.  Have advised her that the metal in her hip replacement will be titanium.  More than likely she should be advised her that more than likely she should be okay to proceed with surgery.  However the only way to know if she has a true mental allergy is to have testing done.  She understands and agrees to proceed

## 2019-09-11 NOTE — TELEPHONE ENCOUNTER
"Just MARILIA: Patient is scheduled for Right JAIME 10/16/19 by SPM - patient \"forgot to mention a metal allergy to anything that's not 14 karat gold, watchbands, snaps on jeans, anything breaks me out in a rash\" Patient was concerned about the metal to be used in her Right JAIME surgery.   "

## 2019-09-18 ENCOUNTER — TELEPHONE (OUTPATIENT)
Dept: ORTHOPEDIC SURGERY | Facility: CLINIC | Age: 64
End: 2019-09-18

## 2019-09-18 DIAGNOSIS — M16.11 ARTHRITIS OF RIGHT HIP: Primary | ICD-10-CM

## 2019-09-18 RX ORDER — METHOCARBAMOL 750 MG/1
TABLET, FILM COATED ORAL
Qty: 60 TABLET | Refills: 2 | Status: SHIPPED | OUTPATIENT
Start: 2019-09-18 | End: 2019-11-07

## 2019-09-18 NOTE — TELEPHONE ENCOUNTER
Discussed issue with leg pain feels like anterior quad muscle spasm that is helped with rubbing her thigh.  Recommend stretching that muscle a couple of times a day and using heat and some muscle massage to loosen it up.  I will also send in a muscle relaxer for her to use at bedtime as needed.  She verb understanding and thanked me for the call.

## 2019-10-03 ENCOUNTER — APPOINTMENT (OUTPATIENT)
Dept: PREADMISSION TESTING | Facility: HOSPITAL | Age: 64
End: 2019-10-03

## 2019-10-03 VITALS
RESPIRATION RATE: 18 BRPM | OXYGEN SATURATION: 96 % | HEART RATE: 73 BPM | DIASTOLIC BLOOD PRESSURE: 81 MMHG | SYSTOLIC BLOOD PRESSURE: 127 MMHG | BODY MASS INDEX: 31 KG/M2 | HEIGHT: 67 IN | WEIGHT: 197.5 LBS | TEMPERATURE: 98.8 F

## 2019-10-03 DIAGNOSIS — M16.11 ARTHRITIS OF RIGHT HIP: ICD-10-CM

## 2019-10-03 LAB
ANION GAP SERPL CALCULATED.3IONS-SCNC: 14.3 MMOL/L (ref 5–15)
BILIRUB UR QL STRIP: NEGATIVE
BUN BLD-MCNC: 11 MG/DL (ref 8–23)
BUN/CREAT SERPL: 14.5 (ref 7–25)
CALCIUM SPEC-SCNC: 9.7 MG/DL (ref 8.6–10.5)
CHLORIDE SERPL-SCNC: 98 MMOL/L (ref 98–107)
CLARITY UR: CLEAR
CO2 SERPL-SCNC: 22.7 MMOL/L (ref 22–29)
COLOR UR: YELLOW
CREAT BLD-MCNC: 0.76 MG/DL (ref 0.57–1)
DEPRECATED RDW RBC AUTO: 42.8 FL (ref 37–54)
ERYTHROCYTE [DISTWIDTH] IN BLOOD BY AUTOMATED COUNT: 12.9 % (ref 12.3–15.4)
GFR SERPL CREATININE-BSD FRML MDRD: 77 ML/MIN/1.73
GLUCOSE BLD-MCNC: 106 MG/DL (ref 65–99)
GLUCOSE UR STRIP-MCNC: NEGATIVE MG/DL
HCT VFR BLD AUTO: 41.1 % (ref 34–46.6)
HGB BLD-MCNC: 13.6 G/DL (ref 12–15.9)
HGB UR QL STRIP.AUTO: NEGATIVE
KETONES UR QL STRIP: NEGATIVE
LEUKOCYTE ESTERASE UR QL STRIP.AUTO: NEGATIVE
MCH RBC QN AUTO: 30.1 PG (ref 26.6–33)
MCHC RBC AUTO-ENTMCNC: 33.1 G/DL (ref 31.5–35.7)
MCV RBC AUTO: 90.9 FL (ref 79–97)
NITRITE UR QL STRIP: NEGATIVE
PH UR STRIP.AUTO: <=5 [PH] (ref 5–8)
PLATELET # BLD AUTO: 389 10*3/MM3 (ref 140–450)
PMV BLD AUTO: 8.8 FL (ref 6–12)
POTASSIUM BLD-SCNC: 4.3 MMOL/L (ref 3.5–5.2)
PROT UR QL STRIP: NEGATIVE
RBC # BLD AUTO: 4.52 10*6/MM3 (ref 3.77–5.28)
SODIUM BLD-SCNC: 135 MMOL/L (ref 136–145)
SP GR UR STRIP: 1.01 (ref 1–1.03)
UROBILINOGEN UR QL STRIP: NORMAL
WBC NRBC COR # BLD: 8.75 10*3/MM3 (ref 3.4–10.8)

## 2019-10-03 PROCEDURE — 93005 ELECTROCARDIOGRAM TRACING: CPT

## 2019-10-03 PROCEDURE — 80048 BASIC METABOLIC PNL TOTAL CA: CPT | Performed by: ORTHOPAEDIC SURGERY

## 2019-10-03 PROCEDURE — 93010 ELECTROCARDIOGRAM REPORT: CPT | Performed by: INTERNAL MEDICINE

## 2019-10-03 PROCEDURE — 81003 URINALYSIS AUTO W/O SCOPE: CPT | Performed by: ORTHOPAEDIC SURGERY

## 2019-10-03 PROCEDURE — 85027 COMPLETE CBC AUTOMATED: CPT | Performed by: ORTHOPAEDIC SURGERY

## 2019-10-03 PROCEDURE — 36415 COLL VENOUS BLD VENIPUNCTURE: CPT

## 2019-10-03 ASSESSMENT — HOOS JR
HOOS JR SCORE: 15
HOOS JR SCORE: 43.335

## 2019-10-03 NOTE — DISCHARGE INSTRUCTIONS
Take the following medications the morning of surgery with a small sip of water:  BRING LISINOPRIL      General Instructions: CLEAR LIQUIDS UNTIL 5:00 AM MORNING OF SURGERY  • Do not eat solid food after midnight the night before surgery.  • You may drink clear liquids day of surgery but must stop at least one hour before your hospital arrival time.  • It is beneficial for you to have a clear drink that contains carbohydrates the day of surgery.  We suggest a 12 to 20 ounce bottle of Gatorade or Powerade for non-diabetic patients or a 12 to 20 ounce bottle of G2 or Powerade Zero for diabetic patients. (Pediatric patients, are not advised to drink a 12 to 20 ounce carbohydrate drink)    Clear liquids are liquids you can see through.  Nothing red in color.     Plain water                               Sports drinks  Sodas                                   Gelatin (Jell-O)  Fruit juices without pulp such as white grape juice and apple juice  Popsicles that contain no fruit or yogurt  Tea or coffee (no cream or milk added)  Gatorade / Powerade  G2 / Powerade Zero    • Infants may have breast milk up to four hours before surgery.  • Infants drinking formula may drink formula up to six hours before surgery.   • Patients who avoid smoking, chewing tobacco and alcohol for 4 weeks prior to surgery have a reduced risk of post-operative complications.  Quit smoking as many days before surgery as you can.  • Do not smoke, use chewing tobacco or drink alcohol the day of surgery.   • If applicable bring your C-PAP/ BI-PAP machine.  • Bring any papers given to you in the doctor’s office.  • Wear clean comfortable clothes.  • Do not wear contact lenses, false eyelashes or make-up.  Bring a case for your glasses.   • Bring crutches or walker if applicable.  • Remove all piercings.  Leave jewelry and any other valuables at home.  • Hair extensions with metal clips must be removed prior to surgery.  • The Pre-Admission Testing nurse  will instruct you to bring medications if unable to obtain an accurate list in Pre-Admission Testing.        If you were given a blood bank ID arm band remember to bring it with you the day of surgery.    Preventing a Surgical Site Infection:  • For 2 to 3 days before surgery, avoid shaving with a razor because the razor can irritate skin and make it easier to develop an infection.    • Any areas of open skin can increase the risk of a post-operative wound infection by allowing bacteria to enter and travel throughout the body.  Notify your surgeon if you have any skin wounds / rashes even if it is not near the expected surgical site.  The area will need assessed to determine if surgery should be delayed until it is healed.  • The night prior to surgery sleep in a clean bed with clean clothing.  Do not allow pets to sleep with you.  • Shower on the morning of surgery using a fresh bar of anti-bacterial soap (such as Dial) and clean washcloth.  Dry with a clean towel and dress in clean clothing.  • Ask your surgeon if you will be receiving antibiotics prior to surgery.  • Make sure you, your family, and all healthcare providers clean their hands with soap and water or an alcohol based hand  before caring for you or your wound.    Day of surgery: 10/16/2019 ARRIVAL TIME 6:00 AM  Your arrival time is approximately two hours before your scheduled surgery time.  Upon arrival, a Pre-op nurse and Anesthesiologist will review your health history, obtain vital signs, and answer questions you may have.  The only belongings needed at this time will be a list of your home medications and if applicable your C-PAP/BI-PAP machine.  If you are staying overnight your family can leave the rest of your belongings in the car and bring them to your room later.  A Pre-op nurse will start an IV and you may receive medication in preparation for surgery, including something to help you relax.  Your family will be able to see you in  the Pre-op area.  Two visitors at a time will be allowed in the Pre-op room.  While you are in surgery your family should notify the waiting room  if they leave the waiting room area and provide a contact phone number.    Please be aware that surgery does come with discomfort.  We want to make every effort to control your discomfort so please discuss any uncontrolled symptoms with your nurse.   Your doctor will most likely have prescribed pain medications.      If you are going home after surgery you will receive individualized written care instructions before being discharged.  A responsible adult must drive you to and from the hospital on the day of your surgery and stay with you for 24 hours.    If you are staying overnight following surgery, you will be transported to your hospital room following the recovery period.  [facility] has all private rooms.    You have received a list of surgical assistants for your reference.  If you have any questions please call Pre-Admission Testing at [phone].  Deductibles and co-payments are collected on the day of service. Please be prepared to pay the required co-pay, deductible or deposit on the day of service as defined by your plan.    2% CHLORAHEXIDINE GLUCONATE* CLOTH  Preparing or “prepping” skin before surgery can reduce the risk of infection at the surgical site. To make the process easier, [facility] has chosen disposable cloths moistened with a rinse-free, 2% Chlorhexidine Gluconate (CHG) antiseptic solution. The steps below outline the prepping process and should be carefully followed.        Use the prep cloth on the area that is circled in the diagram             Directions Night before Surgery  1) Shower using a fresh bar of anti-bacterial soap (such as Dial) and clean washcloth.  Use a clean towel to completely dry your skin.  2) Do not use any lotions, oils or creams on your skin.  3) Open the package and remove 1  cloth, wipe your skin for 30 seconds in a circular motion.  Allow to dry for 3 minutes.  4) Repeat #3 with second cloth.  5) Do not touch your eyes, ears, or mouth with the prep cloth.  6) Allow the wet prep solution to air dry.  7) Discard the prep cloth and wash your hands with soap and water.   8) Dress in clean bed clothes and sleep on fresh clean bed sheets.   9) You may experience some temporary itching after the prep.    Directions Day of Surgery  1) Repeat steps 1,2,3,4,5,6,7, and 9.   2) Dress in clean clothes before coming to the hospital.    BACTROBAN NASAL OINTMENT  There are many germs normally in your nose. Bactroban is an ointment that will help reduce these germs. Please follow these instructions for Bactroban use:      ____The day before surgery in the morning  Date________    ____The day before surgery in the evening              Date________    ____The day of surgery in the morning    Date________    **Squirt ½ package of Bactroban Ointment onto a cotton applicator and apply to inside of 1st nostril.  Squirt the remaining Bactroban and apply to the inside of the other nostril.

## 2019-10-08 ENCOUNTER — OFFICE VISIT (OUTPATIENT)
Dept: ORTHOPEDIC SURGERY | Facility: CLINIC | Age: 64
End: 2019-10-08

## 2019-10-08 VITALS — HEIGHT: 67 IN | WEIGHT: 200 LBS | BODY MASS INDEX: 31.39 KG/M2 | TEMPERATURE: 97.2 F

## 2019-10-08 DIAGNOSIS — M16.11 ARTHRITIS OF RIGHT HIP: Primary | ICD-10-CM

## 2019-10-08 PROCEDURE — S0260 H&P FOR SURGERY: HCPCS | Performed by: NURSE PRACTITIONER

## 2019-10-08 NOTE — PROGRESS NOTES
"   History & Physical       Patient: Jennie Lofton  YOB: 1955  Medical Record Number: [id number]  Wt Readings from Last 3 Encounters:   10/08/19 90.7 kg (200 lb)   10/03/19 89.6 kg (197 lb 8 oz)   08/02/19 88.5 kg (195 lb)     Ht Readings from Last 3 Encounters:   10/08/19 170.2 cm (67\")   10/03/19 170.2 cm (67\")   08/02/19 170.2 cm (67\")     Body mass index is 31.32 kg/m².  Facility age limit for growth percentiles is 20 years.    Surgeon:  Dr. Paul Hercules    Chief Complaints:   Chief Complaint   Patient presents with   • Right Hip - Pre-op Exam, Pain     Surgery:      Subjective:    History of Present Illness: 64 y.o. female presents with   Chief Complaint   Patient presents with   • Right Hip - Pre-op Exam, Pain   . Onset of symptoms was years ago and has been progressively worsening despite more conservative treatment measures.  Symptoms are associated with ability to move, exercise, and perform activities of daily living.  Symptoms are aggravated by weight bearing and ROM necessary for activities of daily living.   Symptoms improve with rest, ice and elevation only minimally.      Allergies:   Allergies   Allergen Reactions   • Metformin And Related GI Intolerance     Admitted x2 with lactic acidosis   • Codeine Anxiety, Dizziness and Nausea Only   • Letrozole Rash   • Naproxen Anxiety and Dizziness   • Other Rash     METAL ALLERGY  \"PT STATES MIGHT BE NICKEL JUST NOT SURE\".       Medications:   Home Medications:  Current Outpatient Medications on File Prior to Visit   Medication Sig   • CHLORHEXIDINE GLUCONATE CLOTH EX Apply  topically.   • Cholecalciferol (VITAMIN D3) 5000 units capsule capsule TAKE ONE CAPSULE BY MOUTH DAILY   • glipiZIDE (GLUCOTROL XL) 5 MG ER tablet Take 1 tablet by mouth Daily. (Patient taking differently: Take 5 mg by mouth Every Evening.)   • glucose blood (ACCU-CHEK JASSI PLUS) test strip Use as instructed test once daily dx e11.9   • Lancets (ACCU-CHEK SOFT " TOUCH) lancets Test once daily   • levothyroxine (SYNTHROID, LEVOTHROID) 150 MCG tablet Take 1 tablet by mouth Daily.   • lisinopril (PRINIVIL,ZESTRIL) 20 MG tablet Take 1 tablet by mouth Daily.   • methocarbamol (ROBAXIN) 750 MG tablet 750 mg tab at bedtime and up to 4 times a day as needed for muscle spasms   • Multiple Vitamins-Minerals (PRESERVISION AREDS 2 PO) Take 1 capsule by mouth Daily. HOLD PRIOR TO SURG   • mupirocin (BACTROBAN) 2 % ointment Apply  topically to the appropriate area as directed. AS DIRECTED PREOP   • SITagliptin (JANUVIA) 100 MG tablet Take 100 mg by mouth Daily.   • ondansetron ODT (ZOFRAN-ODT) 4 MG disintegrating tablet Take 1 tablet by mouth Every 8 (Eight) Hours As Needed for Nausea or Vomiting.     No current facility-administered medications on file prior to visit.      Current Medications:  Scheduled Meds:  Continuous Infusions:  No current facility-administered medications for this visit.   PRN Meds:.    I have reviewed the patient's medical history in detail and updated the computerized patient record.  Review and summarization of old records include:    Past Medical History:   Diagnosis Date   • Arthritis    • Breast cancer (CMS/HCC) 2012    HAD BREAST LUMPECTOMY   • Diabetes mellitus (CMS/HCC)    • Hypertension    • Hypothyroid    • Muscle spasm of right leg    • Neuropathy    • Right hip pain         Past Surgical History:   Procedure Laterality Date   • BREAST LUMPECTOMY Right     right 2012        Social History     Occupational History   • Not on file   Tobacco Use   • Smoking status: Former Smoker     Years: 4.00   • Smokeless tobacco: Never Used   • Tobacco comment: SMOKED IN COLLEGE   Substance and Sexual Activity   • Alcohol use: Yes     Comment: rare   • Drug use: No   • Sexual activity: Not on file    Social History     Social History Narrative   • Not on file        Family History   Problem Relation Age of Onset   • Diabetes Mother    • Diabetes Sister    • Skin  "cancer Father    • Grabiel Hyperthermia Neg Hx        ROS: 14 point review of systems was performed and was negative except for documented findings in HPI and today's encounter.     Allergies:   Allergies   Allergen Reactions   • Metformin And Related GI Intolerance     Admitted x2 with lactic acidosis   • Codeine Anxiety, Dizziness and Nausea Only   • Letrozole Rash   • Naproxen Anxiety and Dizziness   • Other Rash     METAL ALLERGY  \"PT STATES MIGHT BE NICKEL JUST NOT SURE\".     Constitutional:  Denies fever, shaking or chills   Eyes:  Denies change in visual acuity   HENT:  Denies nasal congestion or sore throat   Respiratory:  Denies cough or shortness of breath   Cardiovascular:  Denies chest pain or severe LE edema   GI:  Denies abdominal pain, nausea, vomiting, bloody stools or diarrhea   Musculoskeletal:  Denies numbness tingling or loss of motor function except as outlined above in history of present illness.  : Denies painful urination or hematuria  Integument:  Denies rash, lesion or ulceration   Neurologic:  Denies headache or focal weakness  Endocrine:  Denies lymphadenopathy  Psych:  Denies confusion or change in mental status   Hem:  Denies active bleeding    Physical Exam: 64 y.o. female  Wt Readings from Last 3 Encounters:   10/08/19 90.7 kg (200 lb)   10/03/19 89.6 kg (197 lb 8 oz)   08/02/19 88.5 kg (195 lb)     Ht Readings from Last 3 Encounters:   10/08/19 170.2 cm (67\")   10/03/19 170.2 cm (67\")   08/02/19 170.2 cm (67\")     Body mass index is 31.32 kg/m².  Facility age limit for growth percentiles is 20 years.  Vitals:    10/08/19 1346   Temp: 97.2 °F (36.2 °C)       Vital signs reviewed.   General Appearance:    Alert, cooperative, in no acute distress                  Eyes: conjunctiva clear  ENT: external ears and nose atraumatic  CV: no peripheral edema  Resp: normal respiratory effort  Skin: no rashes or wounds; normal turgor  Psych: mood and affect appropriate  Lymph: no nodes " appreciated  Neuro: gross sensation intact  Vascular:  Palpable peripheral pulse in noted extremity  Musculoskeletal Extremities: HIP Exam: antalgic gait with assistive device right hip, Stinchfield postitive, stiffness, CAITY test positive and guteal pain 2+ pedal pulses and brisk capillary refill Pedal edema none          Diagnostic Tests:  Appointment on 10/03/2019   Component Date Value Ref Range Status   • Glucose 10/03/2019 106* 65 - 99 mg/dL Final   • BUN 10/03/2019 11  8 - 23 mg/dL Final   • Creatinine 10/03/2019 0.76  0.57 - 1.00 mg/dL Final   • Sodium 10/03/2019 135* 136 - 145 mmol/L Final   • Potassium 10/03/2019 4.3  3.5 - 5.2 mmol/L Final   • Chloride 10/03/2019 98  98 - 107 mmol/L Final   • CO2 10/03/2019 22.7  22.0 - 29.0 mmol/L Final   • Calcium 10/03/2019 9.7  8.6 - 10.5 mg/dL Final   • eGFR Non African Amer 10/03/2019 77  >60 mL/min/1.73 Final   • BUN/Creatinine Ratio 10/03/2019 14.5  7.0 - 25.0 Final   • Anion Gap 10/03/2019 14.3  5.0 - 15.0 mmol/L Final   • WBC 10/03/2019 8.75  3.40 - 10.80 10*3/mm3 Final   • RBC 10/03/2019 4.52  3.77 - 5.28 10*6/mm3 Final   • Hemoglobin 10/03/2019 13.6  12.0 - 15.9 g/dL Final   • Hematocrit 10/03/2019 41.1  34.0 - 46.6 % Final   • MCV 10/03/2019 90.9  79.0 - 97.0 fL Final   • MCH 10/03/2019 30.1  26.6 - 33.0 pg Final   • MCHC 10/03/2019 33.1  31.5 - 35.7 g/dL Final   • RDW 10/03/2019 12.9  12.3 - 15.4 % Final   • RDW-SD 10/03/2019 42.8  37.0 - 54.0 fl Final   • MPV 10/03/2019 8.8  6.0 - 12.0 fL Final   • Platelets 10/03/2019 389  140 - 450 10*3/mm3 Final   • Color, UA 10/03/2019 Yellow  Yellow, Straw Final   • Appearance, UA 10/03/2019 Clear  Clear Final   • pH, UA 10/03/2019 <=5.0  5.0 - 8.0 Final   • Specific Gravity, UA 10/03/2019 1.009  1.005 - 1.030 Final   • Glucose, UA 10/03/2019 Negative  Negative Final   • Ketones, UA 10/03/2019 Negative  Negative Final   • Bilirubin, UA 10/03/2019 Negative  Negative Final   • Blood, UA 10/03/2019 Negative  Negative  Final   • Protein, UA 10/03/2019 Negative  Negative Final   • Leuk Esterase, UA 10/03/2019 Negative  Negative Final   • Nitrite, UA 10/03/2019 Negative  Negative Final   • Urobilinogen, UA 10/03/2019 0.2 E.U./dL  0.2 - 1.0 E.U./dL Final       Imaging was done previously in the office, images were personally viewed, viewed images and discussed with the patient:    Indication: pain related symptoms,  Views: 2V AP&LAT right hip(s)   Findings: severe end-stage arthritis (bone on bone, subchondral sclerosis/cysts, osteophytes)  Comparison views: viewed last xray done in the office.     Assessment:  Patient Active Problem List   Diagnosis   • Intractable vomiting with nausea   • Lactic acidosis   • Diabetes mellitus due to underlying condition with diabetic autonomic neuropathy, without long-term current use of insulin (CMS/[facility])   • Hyponatremia   • Hypothyroid   • Essential (primary) hypertension   • Anxiety   • Breast mass   • Breast pain   • Luetscher's syndrome   • DM II (diabetes mellitus, type II), controlled (CMS/[facility])   • Encounter for screening colonoscopy   • History of breast cancer   • Syncope   • UTI (urinary tract infection)   • Arthritis of right hip       Plan:  Dr. Paul Hercules reviewed anatomy of a total joint arthroplasty in laymen's terms, as well as typical postoperative recovery and possibly 6-12 months for maximal recovery, and possible need for rehabilitation stay after hospitalization. We also discussed risks, benefits, alternatives, and limitations of procedure with risks including but not limited to neurovascular damage, bleeding, infection, malalignment, chronic pian, failure of implants, osteolysis, loosening of implants, loss of motion, weakness, stiffness, instability, DVT, pulmonary embolus, death, stroke, complex regional pain syndrome, myocardial infarction, and need for additional procedures. Concept of substitution vs. replacement discussed.  No guarantees were given regarding results  of surgery.      Jennie Lofton was given the opportunity to ask and have all questions answered today.  The patient voiced understanding of the risks, benefits, and alternative forms of treatment that were discussed and the patient consents to proceed with surgery.     Patient's blood clot history is negative.  Planned DVT prophylaxis for surgery:  Eliquis 2.5mg po bid x 30 days, 10/16/19 R JAIME    cleared by Dr. Frank Saenz, try Tramadol   gluc 106/gfr 77/h13.6  A1c 7.1 on 7/15/19  hx HTN DMII Hypothyd, Br CA 2012 with radiation, periph edema L>R  Barrios Simtrol as an     Discharge Plan: POD 2-3 to home and home health      Date: 10/8/2019  Vesta Lund, APRN

## 2019-10-08 NOTE — H&P (VIEW-ONLY)
"   History & Physical       Patient: Jennie Lofton  YOB: 1955  Medical Record Number: [id number]  Wt Readings from Last 3 Encounters:   10/08/19 90.7 kg (200 lb)   10/03/19 89.6 kg (197 lb 8 oz)   08/02/19 88.5 kg (195 lb)     Ht Readings from Last 3 Encounters:   10/08/19 170.2 cm (67\")   10/03/19 170.2 cm (67\")   08/02/19 170.2 cm (67\")     Body mass index is 31.32 kg/m².  Facility age limit for growth percentiles is 20 years.    Surgeon:  Dr. Paul Hercules    Chief Complaints:   Chief Complaint   Patient presents with   • Right Hip - Pre-op Exam, Pain     Surgery:      Subjective:    History of Present Illness: 64 y.o. female presents with   Chief Complaint   Patient presents with   • Right Hip - Pre-op Exam, Pain   . Onset of symptoms was years ago and has been progressively worsening despite more conservative treatment measures.  Symptoms are associated with ability to move, exercise, and perform activities of daily living.  Symptoms are aggravated by weight bearing and ROM necessary for activities of daily living.   Symptoms improve with rest, ice and elevation only minimally.      Allergies:   Allergies   Allergen Reactions   • Metformin And Related GI Intolerance     Admitted x2 with lactic acidosis   • Codeine Anxiety, Dizziness and Nausea Only   • Letrozole Rash   • Naproxen Anxiety and Dizziness   • Other Rash     METAL ALLERGY  \"PT STATES MIGHT BE NICKEL JUST NOT SURE\".       Medications:   Home Medications:  Current Outpatient Medications on File Prior to Visit   Medication Sig   • CHLORHEXIDINE GLUCONATE CLOTH EX Apply  topically.   • Cholecalciferol (VITAMIN D3) 5000 units capsule capsule TAKE ONE CAPSULE BY MOUTH DAILY   • glipiZIDE (GLUCOTROL XL) 5 MG ER tablet Take 1 tablet by mouth Daily. (Patient taking differently: Take 5 mg by mouth Every Evening.)   • glucose blood (ACCU-CHEK JASSI PLUS) test strip Use as instructed test once daily dx e11.9   • Lancets (ACCU-CHEK SOFT " TOUCH) lancets Test once daily   • levothyroxine (SYNTHROID, LEVOTHROID) 150 MCG tablet Take 1 tablet by mouth Daily.   • lisinopril (PRINIVIL,ZESTRIL) 20 MG tablet Take 1 tablet by mouth Daily.   • methocarbamol (ROBAXIN) 750 MG tablet 750 mg tab at bedtime and up to 4 times a day as needed for muscle spasms   • Multiple Vitamins-Minerals (PRESERVISION AREDS 2 PO) Take 1 capsule by mouth Daily. HOLD PRIOR TO SURG   • mupirocin (BACTROBAN) 2 % ointment Apply  topically to the appropriate area as directed. AS DIRECTED PREOP   • SITagliptin (JANUVIA) 100 MG tablet Take 100 mg by mouth Daily.   • ondansetron ODT (ZOFRAN-ODT) 4 MG disintegrating tablet Take 1 tablet by mouth Every 8 (Eight) Hours As Needed for Nausea or Vomiting.     No current facility-administered medications on file prior to visit.      Current Medications:  Scheduled Meds:  Continuous Infusions:  No current facility-administered medications for this visit.   PRN Meds:.    I have reviewed the patient's medical history in detail and updated the computerized patient record.  Review and summarization of old records include:    Past Medical History:   Diagnosis Date   • Arthritis    • Breast cancer (CMS/HCC) 2012    HAD BREAST LUMPECTOMY   • Diabetes mellitus (CMS/HCC)    • Hypertension    • Hypothyroid    • Muscle spasm of right leg    • Neuropathy    • Right hip pain         Past Surgical History:   Procedure Laterality Date   • BREAST LUMPECTOMY Right     right 2012        Social History     Occupational History   • Not on file   Tobacco Use   • Smoking status: Former Smoker     Years: 4.00   • Smokeless tobacco: Never Used   • Tobacco comment: SMOKED IN COLLEGE   Substance and Sexual Activity   • Alcohol use: Yes     Comment: rare   • Drug use: No   • Sexual activity: Not on file    Social History     Social History Narrative   • Not on file        Family History   Problem Relation Age of Onset   • Diabetes Mother    • Diabetes Sister    • Skin  "cancer Father    • Grabiel Hyperthermia Neg Hx        ROS: 14 point review of systems was performed and was negative except for documented findings in HPI and today's encounter.     Allergies:   Allergies   Allergen Reactions   • Metformin And Related GI Intolerance     Admitted x2 with lactic acidosis   • Codeine Anxiety, Dizziness and Nausea Only   • Letrozole Rash   • Naproxen Anxiety and Dizziness   • Other Rash     METAL ALLERGY  \"PT STATES MIGHT BE NICKEL JUST NOT SURE\".     Constitutional:  Denies fever, shaking or chills   Eyes:  Denies change in visual acuity   HENT:  Denies nasal congestion or sore throat   Respiratory:  Denies cough or shortness of breath   Cardiovascular:  Denies chest pain or severe LE edema   GI:  Denies abdominal pain, nausea, vomiting, bloody stools or diarrhea   Musculoskeletal:  Denies numbness tingling or loss of motor function except as outlined above in history of present illness.  : Denies painful urination or hematuria  Integument:  Denies rash, lesion or ulceration   Neurologic:  Denies headache or focal weakness  Endocrine:  Denies lymphadenopathy  Psych:  Denies confusion or change in mental status   Hem:  Denies active bleeding    Physical Exam: 64 y.o. female  Wt Readings from Last 3 Encounters:   10/08/19 90.7 kg (200 lb)   10/03/19 89.6 kg (197 lb 8 oz)   08/02/19 88.5 kg (195 lb)     Ht Readings from Last 3 Encounters:   10/08/19 170.2 cm (67\")   10/03/19 170.2 cm (67\")   08/02/19 170.2 cm (67\")     Body mass index is 31.32 kg/m².  Facility age limit for growth percentiles is 20 years.  Vitals:    10/08/19 1346   Temp: 97.2 °F (36.2 °C)       Vital signs reviewed.   General Appearance:    Alert, cooperative, in no acute distress                  Eyes: conjunctiva clear  ENT: external ears and nose atraumatic  CV: no peripheral edema  Resp: normal respiratory effort  Skin: no rashes or wounds; normal turgor  Psych: mood and affect appropriate  Lymph: no nodes " appreciated  Neuro: gross sensation intact  Vascular:  Palpable peripheral pulse in noted extremity  Musculoskeletal Extremities: HIP Exam: antalgic gait with assistive device right hip, Stinchfield postitive, stiffness, CAITY test positive and guteal pain 2+ pedal pulses and brisk capillary refill Pedal edema none          Diagnostic Tests:  Appointment on 10/03/2019   Component Date Value Ref Range Status   • Glucose 10/03/2019 106* 65 - 99 mg/dL Final   • BUN 10/03/2019 11  8 - 23 mg/dL Final   • Creatinine 10/03/2019 0.76  0.57 - 1.00 mg/dL Final   • Sodium 10/03/2019 135* 136 - 145 mmol/L Final   • Potassium 10/03/2019 4.3  3.5 - 5.2 mmol/L Final   • Chloride 10/03/2019 98  98 - 107 mmol/L Final   • CO2 10/03/2019 22.7  22.0 - 29.0 mmol/L Final   • Calcium 10/03/2019 9.7  8.6 - 10.5 mg/dL Final   • eGFR Non African Amer 10/03/2019 77  >60 mL/min/1.73 Final   • BUN/Creatinine Ratio 10/03/2019 14.5  7.0 - 25.0 Final   • Anion Gap 10/03/2019 14.3  5.0 - 15.0 mmol/L Final   • WBC 10/03/2019 8.75  3.40 - 10.80 10*3/mm3 Final   • RBC 10/03/2019 4.52  3.77 - 5.28 10*6/mm3 Final   • Hemoglobin 10/03/2019 13.6  12.0 - 15.9 g/dL Final   • Hematocrit 10/03/2019 41.1  34.0 - 46.6 % Final   • MCV 10/03/2019 90.9  79.0 - 97.0 fL Final   • MCH 10/03/2019 30.1  26.6 - 33.0 pg Final   • MCHC 10/03/2019 33.1  31.5 - 35.7 g/dL Final   • RDW 10/03/2019 12.9  12.3 - 15.4 % Final   • RDW-SD 10/03/2019 42.8  37.0 - 54.0 fl Final   • MPV 10/03/2019 8.8  6.0 - 12.0 fL Final   • Platelets 10/03/2019 389  140 - 450 10*3/mm3 Final   • Color, UA 10/03/2019 Yellow  Yellow, Straw Final   • Appearance, UA 10/03/2019 Clear  Clear Final   • pH, UA 10/03/2019 <=5.0  5.0 - 8.0 Final   • Specific Gravity, UA 10/03/2019 1.009  1.005 - 1.030 Final   • Glucose, UA 10/03/2019 Negative  Negative Final   • Ketones, UA 10/03/2019 Negative  Negative Final   • Bilirubin, UA 10/03/2019 Negative  Negative Final   • Blood, UA 10/03/2019 Negative  Negative  Final   • Protein, UA 10/03/2019 Negative  Negative Final   • Leuk Esterase, UA 10/03/2019 Negative  Negative Final   • Nitrite, UA 10/03/2019 Negative  Negative Final   • Urobilinogen, UA 10/03/2019 0.2 E.U./dL  0.2 - 1.0 E.U./dL Final       Imaging was done previously in the office, images were personally viewed, viewed images and discussed with the patient:    Indication: pain related symptoms,  Views: 2V AP&LAT right hip(s)   Findings: severe end-stage arthritis (bone on bone, subchondral sclerosis/cysts, osteophytes)  Comparison views: viewed last xray done in the office.     Assessment:  Patient Active Problem List   Diagnosis   • Intractable vomiting with nausea   • Lactic acidosis   • Diabetes mellitus due to underlying condition with diabetic autonomic neuropathy, without long-term current use of insulin (CMS/[facility])   • Hyponatremia   • Hypothyroid   • Essential (primary) hypertension   • Anxiety   • Breast mass   • Breast pain   • Luetscher's syndrome   • DM II (diabetes mellitus, type II), controlled (CMS/[facility])   • Encounter for screening colonoscopy   • History of breast cancer   • Syncope   • UTI (urinary tract infection)   • Arthritis of right hip       Plan:  Dr. Paul Hercules reviewed anatomy of a total joint arthroplasty in laymen's terms, as well as typical postoperative recovery and possibly 6-12 months for maximal recovery, and possible need for rehabilitation stay after hospitalization. We also discussed risks, benefits, alternatives, and limitations of procedure with risks including but not limited to neurovascular damage, bleeding, infection, malalignment, chronic pian, failure of implants, osteolysis, loosening of implants, loss of motion, weakness, stiffness, instability, DVT, pulmonary embolus, death, stroke, complex regional pain syndrome, myocardial infarction, and need for additional procedures. Concept of substitution vs. replacement discussed.  No guarantees were given regarding results  of surgery.      Jennie Lofton was given the opportunity to ask and have all questions answered today.  The patient voiced understanding of the risks, benefits, and alternative forms of treatment that were discussed and the patient consents to proceed with surgery.     Patient's blood clot history is negative.  Planned DVT prophylaxis for surgery:  Eliquis 2.5mg po bid x 30 days, 10/16/19 R JAIME    cleared by Dr. Frank Saenz, try Tramadol   gluc 106/gfr 77/h13.6  A1c 7.1 on 7/15/19  hx HTN DMII Hypothyd, Br CA 2012 with radiation, periph edema L>R  [facility] as an     Discharge Plan: POD 2-3 to home and home health      Date: 10/8/2019  Vesta Lund, APRN

## 2019-10-10 RX ORDER — SITAGLIPTIN 100 MG/1
TABLET, FILM COATED ORAL
Qty: 90 TABLET | Refills: 0 | Status: SHIPPED | OUTPATIENT
Start: 2019-10-10 | End: 2020-01-21

## 2019-10-16 ENCOUNTER — ANESTHESIA EVENT (OUTPATIENT)
Dept: PERIOP | Facility: HOSPITAL | Age: 64
End: 2019-10-16

## 2019-10-16 ENCOUNTER — HOSPITAL ENCOUNTER (INPATIENT)
Facility: HOSPITAL | Age: 64
LOS: 1 days | Discharge: HOME | End: 2019-10-17
Attending: ORTHOPAEDIC SURGERY | Admitting: ORTHOPAEDIC SURGERY

## 2019-10-16 ENCOUNTER — APPOINTMENT (OUTPATIENT)
Dept: GENERAL RADIOLOGY | Facility: HOSPITAL | Age: 64
End: 2019-10-16

## 2019-10-16 ENCOUNTER — ANESTHESIA (OUTPATIENT)
Dept: PERIOP | Facility: HOSPITAL | Age: 64
End: 2019-10-16

## 2019-10-16 DIAGNOSIS — M16.11 ARTHRITIS OF RIGHT HIP: ICD-10-CM

## 2019-10-16 DIAGNOSIS — R11.11 NON-INTRACTABLE VOMITING WITHOUT NAUSEA, UNSPECIFIED VOMITING TYPE: ICD-10-CM

## 2019-10-16 LAB
GLUCOSE BLDC GLUCOMTR-MCNC: 148 MG/DL (ref 70–130)
GLUCOSE BLDC GLUCOMTR-MCNC: 159 MG/DL (ref 70–130)
GLUCOSE BLDC GLUCOMTR-MCNC: 170 MG/DL (ref 70–130)
GLUCOSE BLDC GLUCOMTR-MCNC: 178 MG/DL (ref 70–130)

## 2019-10-16 PROCEDURE — 73501 X-RAY EXAM HIP UNI 1 VIEW: CPT

## 2019-10-16 PROCEDURE — 82962 GLUCOSE BLOOD TEST: CPT

## 2019-10-16 PROCEDURE — 27130 TOTAL HIP ARTHROPLASTY: CPT | Performed by: NURSE PRACTITIONER

## 2019-10-16 PROCEDURE — 25010000003 CEFAZOLIN IN DEXTROSE 2-4 GM/100ML-% SOLUTION: Performed by: NURSE PRACTITIONER

## 2019-10-16 PROCEDURE — 25010000002 KETOROLAC TROMETHAMINE PER 15 MG: Performed by: ORTHOPAEDIC SURGERY

## 2019-10-16 PROCEDURE — 27130 TOTAL HIP ARTHROPLASTY: CPT | Performed by: ORTHOPAEDIC SURGERY

## 2019-10-16 PROCEDURE — 25010000002 HYDROMORPHONE PER 4 MG: Performed by: NURSE ANESTHETIST, CERTIFIED REGISTERED

## 2019-10-16 PROCEDURE — 20985 CPTR-ASST DIR MS PX: CPT | Performed by: ORTHOPAEDIC SURGERY

## 2019-10-16 PROCEDURE — 25010000002 CLONIDINE PER 1 MG: Performed by: ORTHOPAEDIC SURGERY

## 2019-10-16 PROCEDURE — 25010000002 ONDANSETRON PER 1 MG: Performed by: NURSE PRACTITIONER

## 2019-10-16 PROCEDURE — 25010000002 FENTANYL CITRATE (PF) 100 MCG/2ML SOLUTION: Performed by: NURSE ANESTHETIST, CERTIFIED REGISTERED

## 2019-10-16 PROCEDURE — 25010000002 PROPOFOL 10 MG/ML EMULSION: Performed by: NURSE ANESTHETIST, CERTIFIED REGISTERED

## 2019-10-16 PROCEDURE — 97162 PT EVAL MOD COMPLEX 30 MIN: CPT

## 2019-10-16 PROCEDURE — 97110 THERAPEUTIC EXERCISES: CPT

## 2019-10-16 PROCEDURE — 25010000002 MIDAZOLAM PER 1 MG: Performed by: ANESTHESIOLOGY

## 2019-10-16 PROCEDURE — 25010000002 ROPIVACAINE PER 1 MG: Performed by: ORTHOPAEDIC SURGERY

## 2019-10-16 PROCEDURE — 25010000003 CEFAZOLIN IN DEXTROSE 2-4 GM/100ML-% SOLUTION: Performed by: ORTHOPAEDIC SURGERY

## 2019-10-16 PROCEDURE — 25010000002 PROMETHAZINE PER 50 MG: Performed by: NURSE PRACTITIONER

## 2019-10-16 PROCEDURE — C1776 JOINT DEVICE (IMPLANTABLE): HCPCS | Performed by: ORTHOPAEDIC SURGERY

## 2019-10-16 PROCEDURE — C1713 ANCHOR/SCREW BN/BN,TIS/BN: HCPCS | Performed by: ORTHOPAEDIC SURGERY

## 2019-10-16 PROCEDURE — 25010000002 HYDROMORPHONE PER 4 MG: Performed by: NURSE PRACTITIONER

## 2019-10-16 PROCEDURE — 0SR904A REPLACEMENT OF RIGHT HIP JOINT WITH CERAMIC ON POLYETHYLENE SYNTHETIC SUBSTITUTE, UNCEMENTED, OPEN APPROACH: ICD-10-PCS | Performed by: ORTHOPAEDIC SURGERY

## 2019-10-16 PROCEDURE — 25010000002 ONDANSETRON PER 1 MG: Performed by: NURSE ANESTHETIST, CERTIFIED REGISTERED

## 2019-10-16 PROCEDURE — 25010000002 NEOSTIGMINE PER 0.5 MG: Performed by: NURSE ANESTHETIST, CERTIFIED REGISTERED

## 2019-10-16 DEVICE — SUMMIT FEMORAL STEM 12/14 TAPER TAPERED W/DUOFIX HA SIZE 6 STD 150MM
Type: IMPLANTABLE DEVICE | Site: HIP | Status: FUNCTIONAL
Brand: SUMMIT

## 2019-10-16 DEVICE — TOTL HIP COP DEPUY 9641334: Type: IMPLANTABLE DEVICE | Site: HIP | Status: FUNCTIONAL

## 2019-10-16 DEVICE — PINNACLE GRIPTION ACETABULAR SHELL SECTOR 52MM OD
Type: IMPLANTABLE DEVICE | Site: HIP | Status: FUNCTIONAL
Brand: PINNACLE GRIPTION

## 2019-10-16 DEVICE — PINNACLE HIP SOLUTIONS ALTRX POLYETHYLENE ACETABULAR LINER +4 10 DEGREE 36MM ID 52MM OD
Type: IMPLANTABLE DEVICE | Site: HIP | Status: FUNCTIONAL
Brand: PINNACLE ALTRX

## 2019-10-16 DEVICE — BIOLOX DELTA CERAMIC FEMORAL HEAD +1.5 36MM DIA 12/14 TAPER
Type: IMPLANTABLE DEVICE | Site: HIP | Status: FUNCTIONAL
Brand: BIOLOX DELTA

## 2019-10-16 RX ORDER — GLYCOPYRROLATE 0.2 MG/ML
INJECTION INTRAMUSCULAR; INTRAVENOUS AS NEEDED
Status: DISCONTINUED | OUTPATIENT
Start: 2019-10-16 | End: 2019-10-16 | Stop reason: SURG

## 2019-10-16 RX ORDER — HYDROCODONE BITARTRATE AND ACETAMINOPHEN 5; 325 MG/1; MG/1
2 TABLET ORAL EVERY 4 HOURS PRN
Status: DISCONTINUED | OUTPATIENT
Start: 2019-10-16 | End: 2019-10-17 | Stop reason: HOSPADM

## 2019-10-16 RX ORDER — DIPHENHYDRAMINE HCL 25 MG
25 CAPSULE ORAL
Status: DISCONTINUED | OUTPATIENT
Start: 2019-10-16 | End: 2019-10-16 | Stop reason: HOSPADM

## 2019-10-16 RX ORDER — PROMETHAZINE HYDROCHLORIDE 25 MG/ML
12.5 INJECTION, SOLUTION INTRAMUSCULAR; INTRAVENOUS EVERY 6 HOURS PRN
Status: DISCONTINUED | OUTPATIENT
Start: 2019-10-16 | End: 2019-10-17 | Stop reason: HOSPADM

## 2019-10-16 RX ORDER — SODIUM CHLORIDE 9 MG/ML
INJECTION, SOLUTION INTRAVENOUS AS NEEDED
Status: DISCONTINUED | OUTPATIENT
Start: 2019-10-16 | End: 2019-10-16 | Stop reason: HOSPADM

## 2019-10-16 RX ORDER — ROCURONIUM BROMIDE 10 MG/ML
INJECTION, SOLUTION INTRAVENOUS AS NEEDED
Status: DISCONTINUED | OUTPATIENT
Start: 2019-10-16 | End: 2019-10-16 | Stop reason: SURG

## 2019-10-16 RX ORDER — MIDAZOLAM HYDROCHLORIDE 1 MG/ML
2 INJECTION INTRAMUSCULAR; INTRAVENOUS
Status: DISCONTINUED | OUTPATIENT
Start: 2019-10-16 | End: 2019-10-16 | Stop reason: HOSPADM

## 2019-10-16 RX ORDER — FLUMAZENIL 0.1 MG/ML
0.2 INJECTION INTRAVENOUS AS NEEDED
Status: DISCONTINUED | OUTPATIENT
Start: 2019-10-16 | End: 2019-10-16 | Stop reason: HOSPADM

## 2019-10-16 RX ORDER — PROMETHAZINE HYDROCHLORIDE 25 MG/ML
6.25 INJECTION, SOLUTION INTRAMUSCULAR; INTRAVENOUS
Status: DISCONTINUED | OUTPATIENT
Start: 2019-10-16 | End: 2019-10-16 | Stop reason: HOSPADM

## 2019-10-16 RX ORDER — NAPROXEN SODIUM 220 MG
220 TABLET ORAL 2 TIMES DAILY PRN
COMMUNITY
End: 2019-10-17 | Stop reason: HOSPADM

## 2019-10-16 RX ORDER — TRANEXAMIC ACID 100 MG/ML
INJECTION, SOLUTION INTRAVENOUS AS NEEDED
Status: DISCONTINUED | OUTPATIENT
Start: 2019-10-16 | End: 2019-10-16 | Stop reason: SURG

## 2019-10-16 RX ORDER — FENTANYL CITRATE 50 UG/ML
INJECTION, SOLUTION INTRAMUSCULAR; INTRAVENOUS AS NEEDED
Status: DISCONTINUED | OUTPATIENT
Start: 2019-10-16 | End: 2019-10-16 | Stop reason: SURG

## 2019-10-16 RX ORDER — ONDANSETRON 4 MG/1
4 TABLET, FILM COATED ORAL EVERY 6 HOURS PRN
Status: DISCONTINUED | OUTPATIENT
Start: 2019-10-16 | End: 2019-10-17 | Stop reason: HOSPADM

## 2019-10-16 RX ORDER — DOCUSATE SODIUM 100 MG/1
100 CAPSULE, LIQUID FILLED ORAL 2 TIMES DAILY
Status: DISCONTINUED | OUTPATIENT
Start: 2019-10-16 | End: 2019-10-17 | Stop reason: HOSPADM

## 2019-10-16 RX ORDER — HYDROCODONE BITARTRATE AND ACETAMINOPHEN 7.5; 325 MG/1; MG/1
1 TABLET ORAL ONCE AS NEEDED
Status: DISCONTINUED | OUTPATIENT
Start: 2019-10-16 | End: 2019-10-16 | Stop reason: HOSPADM

## 2019-10-16 RX ORDER — ONDANSETRON 2 MG/ML
INJECTION INTRAMUSCULAR; INTRAVENOUS AS NEEDED
Status: DISCONTINUED | OUTPATIENT
Start: 2019-10-16 | End: 2019-10-16 | Stop reason: SURG

## 2019-10-16 RX ORDER — BISACODYL 10 MG
10 SUPPOSITORY, RECTAL RECTAL DAILY PRN
Status: DISCONTINUED | OUTPATIENT
Start: 2019-10-16 | End: 2019-10-17 | Stop reason: HOSPADM

## 2019-10-16 RX ORDER — METHOCARBAMOL 750 MG/1
750 TABLET, FILM COATED ORAL EVERY 6 HOURS PRN
Status: DISCONTINUED | OUTPATIENT
Start: 2019-10-16 | End: 2019-10-17 | Stop reason: HOSPADM

## 2019-10-16 RX ORDER — HYDROMORPHONE HYDROCHLORIDE 1 MG/ML
0.5 INJECTION, SOLUTION INTRAMUSCULAR; INTRAVENOUS; SUBCUTANEOUS
Status: DISCONTINUED | OUTPATIENT
Start: 2019-10-16 | End: 2019-10-17 | Stop reason: HOSPADM

## 2019-10-16 RX ORDER — EPHEDRINE SULFATE 50 MG/ML
5 INJECTION, SOLUTION INTRAVENOUS ONCE AS NEEDED
Status: DISCONTINUED | OUTPATIENT
Start: 2019-10-16 | End: 2019-10-16 | Stop reason: HOSPADM

## 2019-10-16 RX ORDER — NALOXONE HCL 0.4 MG/ML
0.2 VIAL (ML) INJECTION AS NEEDED
Status: DISCONTINUED | OUTPATIENT
Start: 2019-10-16 | End: 2019-10-16 | Stop reason: HOSPADM

## 2019-10-16 RX ORDER — OXYCODONE AND ACETAMINOPHEN 7.5; 325 MG/1; MG/1
1 TABLET ORAL ONCE AS NEEDED
Status: DISCONTINUED | OUTPATIENT
Start: 2019-10-16 | End: 2019-10-16 | Stop reason: HOSPADM

## 2019-10-16 RX ORDER — PROMETHAZINE HYDROCHLORIDE 25 MG/1
25 TABLET ORAL ONCE AS NEEDED
Status: DISCONTINUED | OUTPATIENT
Start: 2019-10-16 | End: 2019-10-16 | Stop reason: HOSPADM

## 2019-10-16 RX ORDER — PROMETHAZINE HYDROCHLORIDE 25 MG/1
25 SUPPOSITORY RECTAL ONCE AS NEEDED
Status: DISCONTINUED | OUTPATIENT
Start: 2019-10-16 | End: 2019-10-16 | Stop reason: HOSPADM

## 2019-10-16 RX ORDER — PROMETHAZINE HYDROCHLORIDE 25 MG/1
12.5 TABLET ORAL EVERY 6 HOURS PRN
Status: DISCONTINUED | OUTPATIENT
Start: 2019-10-16 | End: 2019-10-17 | Stop reason: HOSPADM

## 2019-10-16 RX ORDER — KETAMINE HYDROCHLORIDE 10 MG/ML
INJECTION INTRAMUSCULAR; INTRAVENOUS AS NEEDED
Status: DISCONTINUED | OUTPATIENT
Start: 2019-10-16 | End: 2019-10-16 | Stop reason: SURG

## 2019-10-16 RX ORDER — SODIUM CHLORIDE, SODIUM LACTATE, POTASSIUM CHLORIDE, CALCIUM CHLORIDE 600; 310; 30; 20 MG/100ML; MG/100ML; MG/100ML; MG/100ML
100 INJECTION, SOLUTION INTRAVENOUS CONTINUOUS
Status: DISCONTINUED | OUTPATIENT
Start: 2019-10-16 | End: 2019-10-17 | Stop reason: HOSPADM

## 2019-10-16 RX ORDER — HYDROMORPHONE HCL 110MG/55ML
PATIENT CONTROLLED ANALGESIA SYRINGE INTRAVENOUS AS NEEDED
Status: DISCONTINUED | OUTPATIENT
Start: 2019-10-16 | End: 2019-10-16 | Stop reason: SURG

## 2019-10-16 RX ORDER — DIPHENHYDRAMINE HCL 50 MG
50 CAPSULE ORAL EVERY 6 HOURS PRN
Status: DISCONTINUED | OUTPATIENT
Start: 2019-10-16 | End: 2019-10-17 | Stop reason: HOSPADM

## 2019-10-16 RX ORDER — ACETAMINOPHEN 325 MG/1
325 TABLET ORAL EVERY 4 HOURS PRN
Status: DISCONTINUED | OUTPATIENT
Start: 2019-10-16 | End: 2019-10-17 | Stop reason: HOSPADM

## 2019-10-16 RX ORDER — PROMETHAZINE HYDROCHLORIDE 25 MG/ML
12.5 INJECTION, SOLUTION INTRAMUSCULAR; INTRAVENOUS ONCE AS NEEDED
Status: DISCONTINUED | OUTPATIENT
Start: 2019-10-16 | End: 2019-10-16 | Stop reason: HOSPADM

## 2019-10-16 RX ORDER — ACETAMINOPHEN 325 MG/1
650 TABLET ORAL ONCE AS NEEDED
Status: DISCONTINUED | OUTPATIENT
Start: 2019-10-16 | End: 2019-10-16 | Stop reason: HOSPADM

## 2019-10-16 RX ORDER — NALOXONE HCL 0.4 MG/ML
0.1 VIAL (ML) INJECTION
Status: DISCONTINUED | OUTPATIENT
Start: 2019-10-16 | End: 2019-10-17 | Stop reason: HOSPADM

## 2019-10-16 RX ORDER — LABETALOL HYDROCHLORIDE 5 MG/ML
5 INJECTION, SOLUTION INTRAVENOUS
Status: DISCONTINUED | OUTPATIENT
Start: 2019-10-16 | End: 2019-10-16 | Stop reason: HOSPADM

## 2019-10-16 RX ORDER — LISINOPRIL 20 MG/1
20 TABLET ORAL DAILY
Status: DISCONTINUED | OUTPATIENT
Start: 2019-10-16 | End: 2019-10-17 | Stop reason: HOSPADM

## 2019-10-16 RX ORDER — LIDOCAINE HYDROCHLORIDE 20 MG/ML
INJECTION, SOLUTION INFILTRATION; PERINEURAL AS NEEDED
Status: DISCONTINUED | OUTPATIENT
Start: 2019-10-16 | End: 2019-10-16 | Stop reason: SURG

## 2019-10-16 RX ORDER — MIDAZOLAM HYDROCHLORIDE 1 MG/ML
1 INJECTION INTRAMUSCULAR; INTRAVENOUS
Status: DISCONTINUED | OUTPATIENT
Start: 2019-10-16 | End: 2019-10-16 | Stop reason: HOSPADM

## 2019-10-16 RX ORDER — FAMOTIDINE 10 MG/ML
20 INJECTION, SOLUTION INTRAVENOUS ONCE
Status: COMPLETED | OUTPATIENT
Start: 2019-10-16 | End: 2019-10-16

## 2019-10-16 RX ORDER — GLIPIZIDE 5 MG/1
2.5 TABLET ORAL
Status: DISCONTINUED | OUTPATIENT
Start: 2019-10-16 | End: 2019-10-17 | Stop reason: HOSPADM

## 2019-10-16 RX ORDER — SODIUM CHLORIDE 0.9 % (FLUSH) 0.9 %
3 SYRINGE (ML) INJECTION EVERY 12 HOURS SCHEDULED
Status: DISCONTINUED | OUTPATIENT
Start: 2019-10-16 | End: 2019-10-16 | Stop reason: HOSPADM

## 2019-10-16 RX ORDER — FENTANYL CITRATE 50 UG/ML
50 INJECTION, SOLUTION INTRAMUSCULAR; INTRAVENOUS
Status: DISCONTINUED | OUTPATIENT
Start: 2019-10-16 | End: 2019-10-16 | Stop reason: HOSPADM

## 2019-10-16 RX ORDER — BISACODYL 5 MG/1
10 TABLET, DELAYED RELEASE ORAL DAILY PRN
Status: DISCONTINUED | OUTPATIENT
Start: 2019-10-16 | End: 2019-10-17 | Stop reason: HOSPADM

## 2019-10-16 RX ORDER — SODIUM CHLORIDE, SODIUM LACTATE, POTASSIUM CHLORIDE, CALCIUM CHLORIDE 600; 310; 30; 20 MG/100ML; MG/100ML; MG/100ML; MG/100ML
9 INJECTION, SOLUTION INTRAVENOUS CONTINUOUS
Status: DISCONTINUED | OUTPATIENT
Start: 2019-10-16 | End: 2019-10-17 | Stop reason: HOSPADM

## 2019-10-16 RX ORDER — CEFAZOLIN SODIUM 2 G/100ML
2 INJECTION, SOLUTION INTRAVENOUS EVERY 8 HOURS
Status: COMPLETED | OUTPATIENT
Start: 2019-10-16 | End: 2019-10-16

## 2019-10-16 RX ORDER — DIPHENHYDRAMINE HYDROCHLORIDE 50 MG/ML
12.5 INJECTION INTRAMUSCULAR; INTRAVENOUS
Status: DISCONTINUED | OUTPATIENT
Start: 2019-10-16 | End: 2019-10-16 | Stop reason: HOSPADM

## 2019-10-16 RX ORDER — DIPHENHYDRAMINE HYDROCHLORIDE 50 MG/ML
25 INJECTION INTRAMUSCULAR; INTRAVENOUS EVERY 6 HOURS PRN
Status: DISCONTINUED | OUTPATIENT
Start: 2019-10-16 | End: 2019-10-17 | Stop reason: HOSPADM

## 2019-10-16 RX ORDER — TRAMADOL HYDROCHLORIDE 50 MG/1
100 TABLET ORAL EVERY 4 HOURS PRN
Status: DISCONTINUED | OUTPATIENT
Start: 2019-10-16 | End: 2019-10-17 | Stop reason: HOSPADM

## 2019-10-16 RX ORDER — LIDOCAINE HYDROCHLORIDE 10 MG/ML
0.5 INJECTION, SOLUTION EPIDURAL; INFILTRATION; INTRACAUDAL; PERINEURAL ONCE AS NEEDED
Status: DISCONTINUED | OUTPATIENT
Start: 2019-10-16 | End: 2019-10-16 | Stop reason: HOSPADM

## 2019-10-16 RX ORDER — HYDROCODONE BITARTRATE AND ACETAMINOPHEN 5; 325 MG/1; MG/1
1 TABLET ORAL EVERY 4 HOURS PRN
Status: DISCONTINUED | OUTPATIENT
Start: 2019-10-16 | End: 2019-10-17 | Stop reason: HOSPADM

## 2019-10-16 RX ORDER — TRAMADOL HYDROCHLORIDE 50 MG/1
50 TABLET ORAL EVERY 4 HOURS PRN
Status: DISCONTINUED | OUTPATIENT
Start: 2019-10-16 | End: 2019-10-17 | Stop reason: HOSPADM

## 2019-10-16 RX ORDER — HYDROMORPHONE HYDROCHLORIDE 1 MG/ML
0.5 INJECTION, SOLUTION INTRAMUSCULAR; INTRAVENOUS; SUBCUTANEOUS
Status: DISCONTINUED | OUTPATIENT
Start: 2019-10-16 | End: 2019-10-16 | Stop reason: HOSPADM

## 2019-10-16 RX ORDER — ONDANSETRON 2 MG/ML
4 INJECTION INTRAMUSCULAR; INTRAVENOUS EVERY 6 HOURS PRN
Status: DISCONTINUED | OUTPATIENT
Start: 2019-10-16 | End: 2019-10-17 | Stop reason: HOSPADM

## 2019-10-16 RX ORDER — PROPOFOL 10 MG/ML
VIAL (ML) INTRAVENOUS AS NEEDED
Status: DISCONTINUED | OUTPATIENT
Start: 2019-10-16 | End: 2019-10-16 | Stop reason: SURG

## 2019-10-16 RX ORDER — ONDANSETRON 2 MG/ML
4 INJECTION INTRAMUSCULAR; INTRAVENOUS ONCE AS NEEDED
Status: DISCONTINUED | OUTPATIENT
Start: 2019-10-16 | End: 2019-10-16 | Stop reason: HOSPADM

## 2019-10-16 RX ORDER — LEVOTHYROXINE SODIUM 0.15 MG/1
150 TABLET ORAL
Status: DISCONTINUED | OUTPATIENT
Start: 2019-10-16 | End: 2019-10-17 | Stop reason: HOSPADM

## 2019-10-16 RX ORDER — CEFAZOLIN SODIUM 2 G/100ML
2 INJECTION, SOLUTION INTRAVENOUS ONCE
Status: COMPLETED | OUTPATIENT
Start: 2019-10-16 | End: 2019-10-16

## 2019-10-16 RX ORDER — SODIUM CHLORIDE 0.9 % (FLUSH) 0.9 %
3-10 SYRINGE (ML) INJECTION AS NEEDED
Status: DISCONTINUED | OUTPATIENT
Start: 2019-10-16 | End: 2019-10-16 | Stop reason: HOSPADM

## 2019-10-16 RX ORDER — HYDRALAZINE HYDROCHLORIDE 20 MG/ML
5 INJECTION INTRAMUSCULAR; INTRAVENOUS
Status: DISCONTINUED | OUTPATIENT
Start: 2019-10-16 | End: 2019-10-16 | Stop reason: HOSPADM

## 2019-10-16 RX ADMIN — FAMOTIDINE 20 MG: 10 INJECTION INTRAVENOUS at 07:59

## 2019-10-16 RX ADMIN — LEVOTHYROXINE SODIUM 150 MCG: 150 TABLET ORAL at 15:07

## 2019-10-16 RX ADMIN — GLYCOPYRROLATE 0.4 MG: 0.2 INJECTION INTRAMUSCULAR; INTRAVENOUS at 10:04

## 2019-10-16 RX ADMIN — FENTANYL CITRATE 50 MCG: 50 INJECTION, SOLUTION INTRAMUSCULAR; INTRAVENOUS at 10:33

## 2019-10-16 RX ADMIN — FENTANYL CITRATE 50 MCG: 50 INJECTION, SOLUTION INTRAMUSCULAR; INTRAVENOUS at 11:03

## 2019-10-16 RX ADMIN — TRANEXAMIC ACID 1000 MG: 100 INJECTION, SOLUTION INTRAVENOUS at 09:52

## 2019-10-16 RX ADMIN — DOCUSATE SODIUM 100 MG: 100 CAPSULE, LIQUID FILLED ORAL at 20:28

## 2019-10-16 RX ADMIN — ONDANSETRON 4 MG: 2 INJECTION INTRAMUSCULAR; INTRAVENOUS at 17:14

## 2019-10-16 RX ADMIN — SODIUM CHLORIDE, POTASSIUM CHLORIDE, SODIUM LACTATE AND CALCIUM CHLORIDE 9 ML/HR: 600; 310; 30; 20 INJECTION, SOLUTION INTRAVENOUS at 07:59

## 2019-10-16 RX ADMIN — LISINOPRIL 20 MG: 20 TABLET ORAL at 15:07

## 2019-10-16 RX ADMIN — FENTANYL CITRATE 150 MCG: 50 INJECTION INTRAMUSCULAR; INTRAVENOUS at 08:24

## 2019-10-16 RX ADMIN — APIXABAN 2.5 MG: 2.5 TABLET, FILM COATED ORAL at 20:28

## 2019-10-16 RX ADMIN — NEOSTIGMINE METHYLSULFATE 3 MG: 1 INJECTION INTRAMUSCULAR; INTRAVENOUS; SUBCUTANEOUS at 10:04

## 2019-10-16 RX ADMIN — CEFAZOLIN SODIUM 2 G: 2 INJECTION, SOLUTION INTRAVENOUS at 17:30

## 2019-10-16 RX ADMIN — HYDROMORPHONE HYDROCHLORIDE 0.25 MG: 2 INJECTION INTRAMUSCULAR; INTRAVENOUS; SUBCUTANEOUS at 09:11

## 2019-10-16 RX ADMIN — MUPIROCIN 1 APPLICATION: 20 OINTMENT TOPICAL at 20:28

## 2019-10-16 RX ADMIN — KETAMINE HYDROCHLORIDE 30 MG: 10 INJECTION INTRAMUSCULAR; INTRAVENOUS at 08:35

## 2019-10-16 RX ADMIN — DOCUSATE SODIUM 100 MG: 100 CAPSULE, LIQUID FILLED ORAL at 15:07

## 2019-10-16 RX ADMIN — HYDROMORPHONE HYDROCHLORIDE 0.25 MG: 2 INJECTION INTRAMUSCULAR; INTRAVENOUS; SUBCUTANEOUS at 09:04

## 2019-10-16 RX ADMIN — CEFAZOLIN SODIUM 2 G: 2 INJECTION, SOLUTION INTRAVENOUS at 08:24

## 2019-10-16 RX ADMIN — PROMETHAZINE HYDROCHLORIDE 12.5 MG: 25 INJECTION INTRAMUSCULAR; INTRAVENOUS at 15:50

## 2019-10-16 RX ADMIN — LIDOCAINE HYDROCHLORIDE 100 MG: 20 INJECTION, SOLUTION INFILTRATION; PERINEURAL at 08:24

## 2019-10-16 RX ADMIN — ROCURONIUM BROMIDE 40 MG: 10 INJECTION INTRAVENOUS at 08:24

## 2019-10-16 RX ADMIN — MIDAZOLAM 1 MG: 1 INJECTION INTRAMUSCULAR; INTRAVENOUS at 07:59

## 2019-10-16 RX ADMIN — HYDROCODONE BITARTRATE AND ACETAMINOPHEN 2 TABLET: 5; 325 TABLET ORAL at 17:21

## 2019-10-16 RX ADMIN — FENTANYL CITRATE 50 MCG: 50 INJECTION INTRAMUSCULAR; INTRAVENOUS at 08:36

## 2019-10-16 RX ADMIN — CEFAZOLIN SODIUM 2 G: 2 INJECTION, SOLUTION INTRAVENOUS at 10:23

## 2019-10-16 RX ADMIN — POLYETHYLENE GLYCOL 3350 17 G: 17 POWDER, FOR SOLUTION ORAL at 15:08

## 2019-10-16 RX ADMIN — PROPOFOL 200 MG: 10 INJECTION, EMULSION INTRAVENOUS at 08:24

## 2019-10-16 RX ADMIN — HYDROMORPHONE HYDROCHLORIDE 0.5 MG: 1 INJECTION, SOLUTION INTRAMUSCULAR; INTRAVENOUS; SUBCUTANEOUS at 10:17

## 2019-10-16 RX ADMIN — TRANEXAMIC ACID 1000 MG: 100 INJECTION, SOLUTION INTRAVENOUS at 08:36

## 2019-10-16 RX ADMIN — HYDROCODONE BITARTRATE AND ACETAMINOPHEN 2 TABLET: 5; 325 TABLET ORAL at 22:32

## 2019-10-16 RX ADMIN — SODIUM CHLORIDE, POTASSIUM CHLORIDE, SODIUM LACTATE AND CALCIUM CHLORIDE: 600; 310; 30; 20 INJECTION, SOLUTION INTRAVENOUS at 09:32

## 2019-10-16 RX ADMIN — ONDANSETRON 4 MG: 2 INJECTION INTRAMUSCULAR; INTRAVENOUS at 10:14

## 2019-10-16 RX ADMIN — SODIUM CHLORIDE, POTASSIUM CHLORIDE, SODIUM LACTATE AND CALCIUM CHLORIDE 100 ML/HR: 600; 310; 30; 20 INJECTION, SOLUTION INTRAVENOUS at 17:15

## 2019-10-16 RX ADMIN — LINAGLIPTIN 5 MG: 5 TABLET, FILM COATED ORAL at 17:20

## 2019-10-16 RX ADMIN — HYDROMORPHONE HYDROCHLORIDE 0.5 MG: 1 INJECTION, SOLUTION INTRAMUSCULAR; INTRAVENOUS; SUBCUTANEOUS at 15:03

## 2019-10-16 NOTE — PLAN OF CARE
Problem: Patient Care Overview  Goal: Plan of Care Review   10/16/19 3657   Coping/Psychosocial   Plan of Care Reviewed With patient   OTHER   Outcome Summary Patient is a pleasant 64 y.o. female POD0 R JAIME-posterior with expected post op weakness and impaired functional mobility. Patient is independent at baseline and lives at home a spouse- access to a RW. Today, patient performed bed mobility with Citlaly, required Citlaly for transfers, and ambulated 5' CGA-Citlaly with a RW- ambulation distance limited due to nausea and 10/10 pain. Patient will benefit from skilled PT services acutely to address functional deficits as well as improve level of independence prior to discharge. Anticipate home with HH PT upon DC.

## 2019-10-16 NOTE — ANESTHESIA PROCEDURE NOTES
Airway  Urgency: elective    Date/Time: 10/16/2019 8:28 AM  End Time:10/16/2019 8:46 AM  Airway not difficult    General Information and Staff    Patient location during procedure: OR  Anesthesiologist: Lyndsay Xiao MD  CRNA: Tatiana Appiah CRNA    Indications and Patient Condition  Indications for airway management: airway protection    Preoxygenated: yes  MILS maintained throughout  Mask difficulty assessment: 1 - vent by mask    Final Airway Details  Final airway type: endotracheal airway      Successful airway: ETT  Cuffed: yes   Successful intubation technique: direct laryngoscopy  Endotracheal tube insertion site: oral  Blade: Mccormick  Blade size: 2  ETT size (mm): 7.0  Cormack-Lehane Classification: grade I - full view of glottis  Placement verified by: chest auscultation and capnometry   Measured from: lips  Number of attempts at approach: 1  Assessment: lips, teeth, and gum same as pre-op and atraumatic intubation    Additional Comments  Atraumatic, Secured after verification of placement

## 2019-10-16 NOTE — THERAPY EVALUATION
Patient Name: Jennie Lofton  : 1955    MRN: [id number]                              Today's Date: 10/16/2019       Admit Date: 10/16/2019    Visit Dx:     ICD-10-CM ICD-9-CM   1. Arthritis of right hip M16.11 716.95     Patient Active Problem List   Diagnosis   • Intractable vomiting with nausea   • Lactic acidosis   • Diabetes mellitus due to underlying condition with diabetic autonomic neuropathy, without long-term current use of insulin (CMS/[facility])   • Hyponatremia   • Hypothyroid   • Essential (primary) hypertension   • Anxiety   • Breast mass   • Breast pain   • Luetscher's syndrome   • DM II (diabetes mellitus, type II), controlled (CMS/[facility])   • Encounter for screening colonoscopy   • History of breast cancer   • Syncope   • UTI (urinary tract infection)   • Arthritis of right hip     Past Medical History:   Diagnosis Date   • Arthritis    • Breast cancer (CMS/HCC) 2012    HAD BREAST LUMPECTOMY   • Diabetes mellitus (CMS/[facility])    • Hypertension    • Hypothyroid    • Muscle spasm of right leg    • Neuropathy    • Right hip pain      Past Surgical History:   Procedure Laterality Date   • BREAST LUMPECTOMY Right     right      General Information     Row Name 10/16/19 1616          PT Evaluation Time/Intention    Document Type  evaluation  -MS     Mode of Treatment  physical therapy  -MS     Row Name 10/16/19 1616          General Information    Patient Profile Reviewed?  yes  -MS     Prior Level of Function  independent:;all household mobility  -MS     Existing Precautions/Restrictions  fall;hip, posterior  -MS     Barriers to Rehab  none identified  -MS     Row Name 10/16/19 1616          Relationship/Environment    Lives With  spouse  -MS     Row Name 10/16/19 1616          Resource/Environmental Concerns    Current Living Arrangements  home/apartment/condo  -MS     Row Name 10/16/19 1616          Home Main Entrance    Number of Stairs, Main Entrance  three  -MS     Stair Railings, Main Entrance   railings safe and in good condition  -MS     Row Name 10/16/19 1616          Stairs Within Home, Primary    Number of Stairs, Within Home, Primary  none  -MS     Row Name 10/16/19 1616          Cognitive Assessment/Intervention- PT/OT    Orientation Status (Cognition)  oriented x 4  -MS     Row Name 10/16/19 1616          Safety Issues, Functional Mobility    Safety Issues Affecting Function (Mobility)  positioning of assistive device;sequencing abilities  -MS     Impairments Affecting Function (Mobility)  strength;postural/trunk control;pain;balance;endurance/activity tolerance;range of motion (ROM)  -MS       User Key  (r) = Recorded By, (t) = Taken By, (c) = Cosigned By    Initials Name Provider Type    MS ParhamEsperanza PT Physical Therapist        Mobility     Row Name 10/16/19 1617          Bed Mobility Assessment/Treatment    Bed Mobility Assessment/Treatment  bed mobility (all) activities  -MS     [place] Level (Bed Mobility)  minimum assist (75% patient effort);verbal cues;nonverbal cues (demo/gesture)  -MS     Assistive Device (Bed Mobility)  bed rails;head of bed elevated  -MS     Comment (Bed Mobility)  Primarly required assist with R LE  -MS     Row Name 10/16/19 1617          Transfer Assessment/Treatment    Comment (Transfers)  Sequencing cues and walker placement  cues.  -MS     Row Name 10/16/19 1617          Sit-Stand Transfer    Sit-Stand [place] (Transfers)  minimum assist (75% patient effort);verbal cues;nonverbal cues (demo/gesture)  -MS     Assistive Device (Sit-Stand Transfers)  walker, front-wheeled  -MS     Row Name 10/16/19 1617          Gait/Stairs Assessment/Training    [place] Level (Gait)  contact guard;verbal cues;nonverbal cues (demo/gesture)  -MS     Assistive Device (Gait)  walker, front-wheeled  -MS     Distance in Feet (Gait)  5  -MS     Pattern (Gait)  step-to  -MS     Deviations/Abnormal Patterns (Gait)  antalgic;rhonda decreased;gait speed decreased  -MS      Comment (Gait/Stairs)  10/10 pain reported with nausea- distance terminated due to presentation. RN aware and administered medicine.  -MS     Row Name 10/16/19 1617          Mobility Assessment/Intervention    Extremity Weight-bearing Status  right lower extremity  -MS     Right Lower Extremity (Weight-bearing Status)  weight-bearing as tolerated (WBAT)  -MS       User Key  (r) = Recorded By, (t) = Taken By, (c) = Cosigned By    Initials Name Provider Type    MS Esperanza Parham, PT Physical Therapist        Obj/Interventions     Row Name 10/16/19 1618          General ROM    GENERAL ROM COMMENTS  R LE limited 2/2 pain  -MS     Row Name 10/16/19 1618          MMT (Manual Muscle Testing)    General MMT Comments  R LE post op weakness  -MS     Row Name 10/16/19 1618          Therapeutic Exercise    Comment (Therapeutic Exercise)  R JAIME protocol x 10 reps  -MS     Row Name 10/16/19 1618          Static Sitting Balance    Level of [place] (Unsupported Sitting, Static Balance)  supervision  -MS     Sitting Position (Unsupported Sitting, Static Balance)  sitting on edge of bed  -MS     Row Name 10/16/19 1618          Static Standing Balance    Level of [place] (Supported Standing, Static Balance)  contact guard assist  -MS     Assistive Device Utilized (Supported Standing, Static Balance)  walker, rolling  -MS     Row Name 10/16/19 1618          Sensory Assessment/Intervention    Sensory General Assessment  no sensation deficits identified  -MS       User Key  (r) = Recorded By, (t) = Taken By, (c) = Cosigned By    Initials Name Provider Type    Esperanza Gibson PT Physical Therapist        Goals/Plan     Row Name 10/16/19 1620          Bed Mobility Goal 1 (PT)    Activity/Assistive Device (Bed Mobility Goal 1, PT)  bed mobility activities, all  -MS     [place] Level/Cues Needed (Bed Mobility Goal 1, PT)  supervision required  -MS     Time Frame (Bed Mobility Goal 1, PT)  1 week  -MS     Row Name  10/16/19 1620          Transfer Goal 1 (PT)    Activity/Assistive Device (Transfer Goal 1, PT)  transfers, all;walker, rolling  -MS     [place] Level/Cues Needed (Transfer Goal 1, PT)  supervision required  -MS     Time Frame (Transfer Goal 1, PT)  1 week  -MS     Row Name 10/16/19 1620          Gait Training Goal 1 (PT)    Activity/Assistive Device (Gait Training Goal 1, PT)  gait (walking locomotion);walker, rolling  -MS     [place] Level (Gait Training Goal 1, PT)  supervision required  -MS     Distance (Gait Goal 1, PT)  150  -MS     Time Frame (Gait Training Goal 1, PT)  1 week  -MS     Row Name 10/16/19 1620          Stairs Goal 1 (PT)    Activity/Assistive Device (Stairs Goal 1, PT)  stairs, all skills;walker, rolling  -MS     [place] Level/Cues Needed (Stairs Goal 1, PT)  contact guard assist  -MS     Number of Stairs (Stairs Goal 1, PT)  3  -MS     Time Frame (Stairs Goal 1, PT)  1 week  -MS       User Key  (r) = Recorded By, (t) = Taken By, (c) = Cosigned By    Initials Name Provider Type    MS ParhamEsperanza, PT Physical Therapist        Clinical Impression     Row Name 10/16/19 1619          Pain Assessment    Additional Documentation  Pain Scale: Numbers Pre/Post-Treatment (Group)  -MS     Row Name 10/16/19 1619          Pain Scale: Numbers Pre/Post-Treatment    Pain Scale: Numbers, Pretreatment  10/10  -MS     Pain Scale: Numbers, Post-Treatment  10/10  -MS     Pain Location - Side  Right  -MS     Pain Location  hip  -MS     Row Name 10/16/19 1619          Plan of Care Review    Plan of Care Reviewed With  patient  -MS     Row Name 10/16/19 1619          Physical Therapy Clinical Impression    Patient/Family Goals Statement (PT Clinical Impression)  POD0 R JAIME- posterior  -MS     Criteria for Skilled Interventions Met (PT Clinical Impression)  yes;treatment indicated  -MS     Rehab Potential (PT Clinical Summary)  good, to achieve stated therapy goals  -MS     Row Name 10/16/19 6584           Vital Signs    O2 Delivery Pre Treatment  supplemental O2  -MS     Post SpO2 (%)  94  -MS     O2 Delivery Post Treatment  supplemental O2  -MS     Row Name 10/16/19 1619          Positioning and Restraints    Pre-Treatment Position  in bed  -MS     Post Treatment Position  chair  -MS     In Chair  sitting;call light within reach;encouraged to call for assist;legs elevated  -MS       User Key  (r) = Recorded By, (t) = Taken By, (c) = Cosigned By    Initials Name Provider Type    Esperanza Gibson, PT Physical Therapist        Outcome Measures     Row Name 10/16/19 1621          How much help from another person do you currently need...    Turning from your back to your side while in flat bed without using bedrails?  3  -MS     Moving from lying on back to sitting on the side of a flat bed without bedrails?  3  -MS     Moving to and from a bed to a chair (including a wheelchair)?  3  -MS     Standing up from a chair using your arms (e.g., wheelchair, bedside chair)?  3  -MS     Climbing 3-5 steps with a railing?  2  -MS     To walk in hospital room?  2  -MS     AM-PAC 6 Clicks Score (PT)  16  -MS     Row Name 10/16/19 1621          Functional Assessment    Outcome Measure Options  AM-PAC 6 Clicks Basic Mobility (PT)  -MS       User Key  (r) = Recorded By, (t) = Taken By, (c) = Cosigned By    Initials Name Provider Type    Esperanza Gibson, PT Physical Therapist        Physical Therapy Education     Title: PT OT SLP Therapies (In Progress)     Topic: Physical Therapy (In Progress)     Point: Mobility training (In Progress)     Learning Progress Summary           Patient Acceptance, E, NR by MS at 10/16/2019  4:22 PM    Acceptance, E, VU,NR by MS at 10/16/2019  4:21 PM                   Point: Home exercise program (In Progress)     Learning Progress Summary           Patient Acceptance, E, NR by MS at 10/16/2019  4:22 PM    Acceptance, E, VU,NR by MS at 10/16/2019  4:21 PM                   Point:  Body mechanics (In Progress)     Learning Progress Summary           Patient Acceptance, E, NR by MS at 10/16/2019  4:22 PM    Acceptance, E, VU,NR by MS at 10/16/2019  4:21 PM                   Point: Precautions (In Progress)     Learning Progress Summary           Patient Acceptance, E, NR by MS at 10/16/2019  4:22 PM    Acceptance, E, VU,NR by MS at 10/16/2019  4:21 PM                               User Key     Initials Effective Dates Name Provider Type Discipline    MS 03/04/19 -  Esperanza Parham PT Physical Therapist PT              PT Recommendation and Plan  Planned Therapy Interventions (PT Eval): balance training, bed mobility training, gait training, home exercise program, patient/family education, postural re-education, stair training, strengthening, ROM (range of motion), transfer training  Outcome Summary/Treatment Plan (PT)  Anticipated Discharge Disposition (PT): home with home health  Plan of Care Reviewed With: patient  Outcome Summary: Patient is a pleasant 64 y.o. female POD0 R JAIME-posterior with expected post op weakness and impaired functional mobility. Patient is independent at baseline and lives at home a spouse- access to a RW. Today, patient performed bed mobility with [place], required [place] for transfers, and ambulated 5' CGA-[place] with a RW- ambulation distance limited due to nausea and 10/10 pain. Patient will benefit from skilled PT services acutely to address functional deficits as well as improve level of independence prior to discharge. Anticipate home with  PT upon DC.     Time Calculation:   PT Charges     Row Name 10/16/19 1616             Time Calculation    Start Time  1545  -MS      Stop Time  1610  -MS      Time Calculation (min)  25 min  -MS      PT Received On  10/16/19  -MS      PT - Next Appointment  10/17/19  -MS      PT Goal Re-Cert Due Date  10/23/19  -MS        User Key  (r) = Recorded By, (t) = Taken By, (c) = Cosigned By    Initials Name Provider Type    MS  Esperanza Parham, PT Physical Therapist        Therapy Charges for Today     Code Description Service Date Service Provider Modifiers Qty    96797946413 HC PT EVAL MOD COMPLEXITY 2 10/16/2019 Esperanza Parham, PT GP 1    47678099704 HC PT THER PROC EA 15 MIN 10/16/2019 Esperanza Parham, PT GP 1    63164488139 HC PT THER SUPP EA 15 MIN 10/16/2019 Esperanza Parham, PT GP 1          PT G-Codes  Outcome Measure Options: AM-PAC 6 Clicks Basic Mobility (PT)  AM-PAC 6 Clicks Score (PT): 16    Esperanza Parham, PT  10/16/2019

## 2019-10-16 NOTE — ANESTHESIA POSTPROCEDURE EVALUATION
Patient: Jennie Lofton    Procedure Summary     Date:  10/16/19 Room / Location:  [facility] OR  / [facility] MAIN OR    Anesthesia Start:  0810 Anesthesia Stop:  1021    Procedure:  RIGHT TOTAL HIP ARTHROPLASTY GUY NAVIGATION (Right Hip) Diagnosis:       Arthritis of right hip      (Arthritis of right hip [M16.11])    Surgeon:  Paul Hercules MD Provider:  Lyndsay Xiao MD    Anesthesia Type:  general ASA Status:  2          Anesthesia Type: general  Last vitals  BP   146/88 (10/16/19 1045)   Temp   36.7 °C (98 °F) (10/16/19 1017)   Pulse   66 (10/16/19 1045)   Resp   14 (10/16/19 1045)     SpO2   100 % (10/16/19 1045)     Post Anesthesia Care and Evaluation    Patient location during evaluation: PACU  Patient participation: complete - patient participated  Level of consciousness: awake and alert  Pain management: adequate  Airway patency: patent  Anesthetic complications: No anesthetic complications    Cardiovascular status: acceptable  Respiratory status: acceptable  Hydration status: acceptable    Comments: --------------------            10/16/19               1045     --------------------   BP:       146/88     Pulse:      66       Resp:       14       Temp:                SpO2:      100%     --------------------

## 2019-10-16 NOTE — PLAN OF CARE
Problem: Patient Care Overview  Goal: Individualization and Mutuality  Outcome: Ongoing (interventions implemented as appropriate)  Pt with hx of HTN and diabetes,will monitor bp and blood glucoses. Will resume pts home mds for these

## 2019-10-16 NOTE — PERIOPERATIVE NURSING NOTE
PT STATES SHE HAD A FEW LYMPH NODES REMOVED FROM UNDER RIGHT ARM AFTER A RIGHT BREAST LUMPECTOMY. SHE STATES HER MD NEVER TOLD HER SHE WAS RESTRICTED ON THE RIGHT ARM FOR B/P'S OR STICKS.

## 2019-10-16 NOTE — OP NOTE
Operative Note  Name: Jennie Lofton  YOB: 1955  MRN: [id number]  BMI: Body mass index is 31.2 kg/m².    DATE OF SURGERY: 10/16/2019    PREOPERATIVE DIAGNOSIS:  right hip end-stage osteoarthritis    POSTOPERATIVE DIAGNOSIS: Same.    PROCEDURE PERFORMED:  right Total hip replacement with Robi navigation    SURGEON:  Paul Hercules M.D.    ASSISTANT:  OLEG Hawkins    IMPLANTS:   Implant Name Type Inv. Item Serial No.  Lot No. LRB No. Used   LINER ACET ALTRX FACECHG 10D 52X36 PLS4 - [id number] Implant LINER ACET ALTRX FACECHG 10D 52X36 PLS4  DEPUY [id number] Right 1   CUP ACET PINN SECTOR W GRIPTN 52MM - [id number] Implant CUP ACET PINN SECTOR W GRIPTN 52MM  DEPUY [id number] Right 1   STEM FEM/HIP SUMMIT/DUOFIX HA OFFST/STD SZ6 - [id number] Implant STEM FEM/HIP SUMMIT/DUOFIX HA OFFST/STD SZ6  DEPUY [id number] Right 1   HD FEM BIOLOXDELTA/ART CERAM 36MM PLS1.5 - [id number] Implant HD FEM BIOLOXDELTA/ART CERAM 36MM PLS1.5  DEPUY [id number] Right 1        Anesthesia: Gen. and local  IV fluid: 1500 crystalloid  Blood loss: 400 cc  Specimen: None  Drain: None  Complications: None   22 Modifier:  none    Description:    Jennie Lofton is a 64 y.o.-year-old female with end-stage osteoarthritis of the hip, who presents today for surgery having failed nonoperative therapy.  This patient was brought into the operating room and placed in the supine position on the operative table. Surgical time out was performed.  Gen. anesthetic was induced, the patient was given IV antibiotics, and was placed in a lateral decubitus position with padding and an axillary roll. The operative lower extremity was prepped with Betadine scrub and paint, and draped with sterile drapes including an Ioban. 2 stab wounds were made above the iliac crest anterior superior iliac spine. Two 5 mm half pins were placed in the iliac crest and navigation protocol was undertaken.    A 3-4 inch incision was based in the skin  posteriorly. Dissection was carried down to the fascia which was opened. Retractors were placed. The sciatic nerve was identified and avoided. The short external rotators were identified and the capsulotomy was performed. The hip was dislocated and femoral neck cut was made. It was made in line with the template of just less than a fingerbreadth. Acetabular soft tissues were removed and retractors were placed. Acetabular navigation was achieved. Was then reamed sequentially up to fitted size. A trial was placed. It was reamed in approximately 40° of abduction and 20° of anteversion. Bone grafts were applied and appropriate size cup was selected and impacted to affix the cup. The final position was 42° of abduction and 22° of anteversion.    Femoral canal was opened with a . The femur was sequentially broached up to appropriate size using the Tri-Lock system and good fit and fill.  Offset was selected using the preoperative template for starting point and tried to correct fit. Leg length was restored or slightly lengthened by 2 mm according the navigation which was in line with the preoperative template. Trial showed equivalent leg lengths, no undue tightness, no undue shock, and good approximation of soft tissues. The hip was stable anteriorly and 90° of flexion you could internally rotated to 70-75° before it started to come out.    Bony surfaces were irrigated and the Tri-Lock was impacted with gentle blows of the mallet. Once again trialed and found to do best with the chosen liner. It reproduced stability and leg length and offset of the trials. The ceramic femoral head was then applied and taken through final range of motion checked all of which were excellent. The wound was dry at the time of surgical conclusion. Tranexamic acid was given and orthopedic mix were infiltrated. The final irrigation was achieved with all 3 L of irrigation fluid. The short external rotators were repaired in an interrupted  fashion with a good closure of the capsule and short external rotators. The fascia was closed in an interrupted fashion in a watertight manner. Multilayered closure was placed in the subcutaneous fat which measured 3-4 inches. This required additional time and skill the skin was closed with a v lock suture. Surgical glue was applied and sterile dressings were applied as well. Findings needle and instrument counts reported to be correct. No complications noted but x-rays ordered for the recovery area.     Surgical assistant performed retraction, suction, hemostasis, and specific limb positioning and manipulation required for accuracy of joint placement, and assistance in wound closure and dressing application.     Dr. Paul Hercules dictating an operative note.    10/16/2019

## 2019-10-16 NOTE — PERIOPERATIVE NURSING NOTE
DANILO HERE, DR ROSALES'S NP, LET HER KNOW PT DOES NOT REMEMBER HER SURGEON RESTRICTING HER RIGHT ARM FOR B/P'S OR STICKS. DANILO STATES IT WOULD BE BEST TO DO SO TO PREVENT INJURY TO THAT ARM. PLACED PINK RESTRICTED ARMBAND ON RIGHT WRIST.

## 2019-10-16 NOTE — ANESTHESIA PREPROCEDURE EVALUATION
Anesthesia Evaluation     Patient summary reviewed and Nursing notes reviewed   no history of anesthetic complications:  NPO Solid Status: > 8 hours  NPO Liquid Status: > 2 hours           Airway   Mallampati: II  TM distance: >3 FB  Neck ROM: full  No difficulty expected  Dental - normal exam     Pulmonary - negative pulmonary ROS and normal exam    breath sounds clear to auscultation  Cardiovascular - normal exam    ECG reviewed  Rhythm: regular  Rate: normal    (+) hypertension less than 2 medications,       Neuro/Psych  (+) syncope, psychiatric history Anxiety,     GI/Hepatic/Renal/Endo    (+) obesity,   diabetes mellitus type 2, hypothyroidism,     Musculoskeletal     Abdominal   (+) obese,    Substance History - negative use     OB/GYN negative ob/gyn ROS         Other   (+) arthritis   history of cancer      Other Comment: Alopecia, wears wig                  Anesthesia Plan    ASA 2     general     intravenous induction   Anesthetic plan, all risks, benefits, and alternatives have been provided, discussed and informed consent has been obtained with: patient.

## 2019-10-17 VITALS
BODY MASS INDEX: 31.26 KG/M2 | TEMPERATURE: 98.2 F | HEIGHT: 67 IN | SYSTOLIC BLOOD PRESSURE: 105 MMHG | RESPIRATION RATE: 16 BRPM | DIASTOLIC BLOOD PRESSURE: 61 MMHG | OXYGEN SATURATION: 96 % | WEIGHT: 199.19 LBS | HEART RATE: 81 BPM

## 2019-10-17 LAB
ANION GAP SERPL CALCULATED.3IONS-SCNC: 11.9 MMOL/L (ref 5–15)
BUN BLD-MCNC: 13 MG/DL (ref 8–23)
BUN/CREAT SERPL: 18.3 (ref 7–25)
CALCIUM SPEC-SCNC: 8.6 MG/DL (ref 8.6–10.5)
CHLORIDE SERPL-SCNC: 96 MMOL/L (ref 98–107)
CO2 SERPL-SCNC: 24.1 MMOL/L (ref 22–29)
CREAT BLD-MCNC: 0.71 MG/DL (ref 0.57–1)
GFR SERPL CREATININE-BSD FRML MDRD: 83 ML/MIN/1.73
GLUCOSE BLD-MCNC: 164 MG/DL (ref 65–99)
GLUCOSE BLDC GLUCOMTR-MCNC: 104 MG/DL (ref 70–130)
GLUCOSE BLDC GLUCOMTR-MCNC: 183 MG/DL (ref 70–130)
HCT VFR BLD AUTO: 32.1 % (ref 34–46.6)
HGB BLD-MCNC: 10.8 G/DL (ref 12–15.9)
POTASSIUM BLD-SCNC: 3.9 MMOL/L (ref 3.5–5.2)
SODIUM BLD-SCNC: 132 MMOL/L (ref 136–145)

## 2019-10-17 PROCEDURE — 80048 BASIC METABOLIC PNL TOTAL CA: CPT | Performed by: NURSE PRACTITIONER

## 2019-10-17 PROCEDURE — 85014 HEMATOCRIT: CPT | Performed by: NURSE PRACTITIONER

## 2019-10-17 PROCEDURE — 97150 GROUP THERAPEUTIC PROCEDURES: CPT

## 2019-10-17 PROCEDURE — 97110 THERAPEUTIC EXERCISES: CPT

## 2019-10-17 PROCEDURE — 82962 GLUCOSE BLOOD TEST: CPT

## 2019-10-17 PROCEDURE — 97165 OT EVAL LOW COMPLEX 30 MIN: CPT

## 2019-10-17 PROCEDURE — 25010000002 HYDROMORPHONE PER 4 MG: Performed by: NURSE PRACTITIONER

## 2019-10-17 PROCEDURE — 85018 HEMOGLOBIN: CPT | Performed by: NURSE PRACTITIONER

## 2019-10-17 PROCEDURE — 97535 SELF CARE MNGMENT TRAINING: CPT

## 2019-10-17 PROCEDURE — 99024 POSTOP FOLLOW-UP VISIT: CPT | Performed by: NURSE PRACTITIONER

## 2019-10-17 RX ORDER — POLYETHYLENE GLYCOL 3350 17 G/17G
17 POWDER, FOR SOLUTION ORAL DAILY
Qty: 510 G | Refills: 3 | Status: SHIPPED | OUTPATIENT
Start: 2019-10-18 | End: 2019-11-07

## 2019-10-17 RX ORDER — ONDANSETRON 4 MG/1
4 TABLET, ORALLY DISINTEGRATING ORAL EVERY 8 HOURS PRN
Qty: 24 TABLET | Refills: 2 | Status: SHIPPED | OUTPATIENT
Start: 2019-10-17 | End: 2019-11-07

## 2019-10-17 RX ORDER — BISACODYL 5 MG/1
10 TABLET, DELAYED RELEASE ORAL DAILY PRN
Qty: 30 TABLET | Refills: 2 | Status: SHIPPED | OUTPATIENT
Start: 2019-10-17 | End: 2019-11-07

## 2019-10-17 RX ORDER — HYDROCODONE BITARTRATE AND ACETAMINOPHEN 5; 325 MG/1; MG/1
TABLET ORAL
Qty: 84 TABLET | Refills: 0 | Status: SHIPPED | OUTPATIENT
Start: 2019-10-17 | End: 2019-10-29 | Stop reason: SDUPTHER

## 2019-10-17 RX ORDER — DOCUSATE SODIUM 100 MG/1
100 CAPSULE, LIQUID FILLED ORAL 2 TIMES DAILY
Qty: 60 CAPSULE | Refills: 2 | Status: SHIPPED | OUTPATIENT
Start: 2019-10-17 | End: 2020-01-16

## 2019-10-17 RX ADMIN — HYDROCODONE BITARTRATE AND ACETAMINOPHEN 2 TABLET: 5; 325 TABLET ORAL at 08:41

## 2019-10-17 RX ADMIN — LINAGLIPTIN 5 MG: 5 TABLET, FILM COATED ORAL at 08:41

## 2019-10-17 RX ADMIN — MUPIROCIN 1 APPLICATION: 20 OINTMENT TOPICAL at 08:41

## 2019-10-17 RX ADMIN — GLIPIZIDE 2.5 MG: 5 TABLET ORAL at 08:41

## 2019-10-17 RX ADMIN — METHOCARBAMOL TABLETS 750 MG: 750 TABLET, COATED ORAL at 06:14

## 2019-10-17 RX ADMIN — APIXABAN 2.5 MG: 2.5 TABLET, FILM COATED ORAL at 08:41

## 2019-10-17 RX ADMIN — POLYETHYLENE GLYCOL 3350 17 G: 17 POWDER, FOR SOLUTION ORAL at 08:41

## 2019-10-17 RX ADMIN — HYDROCODONE BITARTRATE AND ACETAMINOPHEN 2 TABLET: 5; 325 TABLET ORAL at 13:50

## 2019-10-17 RX ADMIN — DOCUSATE SODIUM 100 MG: 100 CAPSULE, LIQUID FILLED ORAL at 08:41

## 2019-10-17 RX ADMIN — HYDROMORPHONE HYDROCHLORIDE 0.5 MG: 1 INJECTION, SOLUTION INTRAMUSCULAR; INTRAVENOUS; SUBCUTANEOUS at 02:16

## 2019-10-17 RX ADMIN — HYDROCODONE BITARTRATE AND ACETAMINOPHEN 2 TABLET: 5; 325 TABLET ORAL at 02:38

## 2019-10-17 RX ADMIN — LEVOTHYROXINE SODIUM 150 MCG: 150 TABLET ORAL at 06:14

## 2019-10-17 NOTE — PROGRESS NOTES
"    Orthopedic Total Joint Progress Note        Patient: Jennie Lofton    Date of Admission: 10/16/2019  6:16 AM    YOB: 1955    Medical Record Number: [id number]    Attending Physician: Paul Hercules MD      POD # 1 Day Post-Op Procedure(s) (LRB):  RIGHT TOTAL HIP ARTHROPLASTY GUY NAVIGATION (Right)       Systemic or Specific Complaints: No Complaints      Allergies:   Allergies   Allergen Reactions   • Metformin And Related GI Intolerance     Admitted x2 with lactic acidosis   • Codeine Anxiety, Dizziness and Nausea Only   • Letrozole Rash   • Naproxen Anxiety and Dizziness   • Other Rash     METAL ALLERGY  \"PT STATES MIGHT BE NICKEL JUST NOT SURE\".       Medications:   Current Medications:  Scheduled Meds:  apixaban 2.5 mg Oral Q12H   docusate sodium 100 mg Oral BID   glipizide 2.5 mg Oral BID AC   [START ON 10/18/2019] influenza vaccine 0.5 mL Intramuscular Once   levothyroxine 150 mcg Oral Q AM   linagliptin 5 mg Oral Daily   lisinopril 20 mg Oral Daily   mupirocin  Each Nare BID   polyethylene glycol 17 g Oral Daily     Continuous Infusions:  lactated ringers 9 mL/hr Last Rate: 9 mL/hr (10/16/19 0759)   lactated ringers 100 mL/hr Last Rate: Stopped (10/17/19 0221)     PRN Meds:.•  acetaminophen  •  bisacodyl  •  bisacodyl  •  diphenhydrAMINE **OR** diphenhydrAMINE  •  HYDROcodone-acetaminophen  •  HYDROcodone-acetaminophen  •  HYDROmorphone **AND** naloxone  •  magnesium hydroxide  •  methocarbamol  •  ondansetron **OR** ondansetron  •  promethazine **OR** promethazine  •  promethazine  •  traMADol **OR** traMADol      Physical Exam: 64 y.o. female Wt Readings from Last 3 Encounters:   10/16/19 90.4 kg (199 lb 3 oz)   10/08/19 90.7 kg (200 lb)   10/03/19 89.6 kg (197 lb 8 oz)     Ht Readings from Last 3 Encounters:   10/16/19 170.2 cm (67\")   10/08/19 170.2 cm (67\")   10/03/19 170.2 cm (67\")     Body mass index is 31.2 kg/m².  Facility age limit for growth percentiles is 20 " years.    General Appearance:    alert and oriented            Pain Relief: Patient reports good relief Vitals:    10/16/19 2022 10/16/19 2318 10/17/19 0235 10/17/19 0728   BP: 92/59 112/70 116/57 99/63   BP Location: Left arm Left arm Left arm Left arm   Patient Position: Lying Lying Lying Lying   Pulse: 75 71 80 89   Resp: 16 16 16 16   Temp: 98.7 °F (37.1 °C) 98.6 °F (37 °C) 98 °F (36.7 °C) 98.6 °F (37 °C)   TempSrc: Oral Oral Oral Oral   SpO2: 98% 99% 91% 98%   Weight:       Height:              HEENT:    Normocephalic, without obvious abnormality, atraumatic.             Lungs:     Respirations regular, even and unlabored      Heart:    Regular controlled rate   Abdomen:     soft non-tender, non-distended       Extremities:   Operative extremity neurovascular status intact. ROM appropriate.  Incision intact w/out signs or symptoms of infection.  No cyanosis, calf is soft and nontender.     Pulses:     Pulses palpable and equal bilaterally     Skin:     Skin Warm/Dry w/out cyanosis     Activity: Mobilizing Per P.T.   Weight Bearing: As Tolerated    Diagnostic Tests:   Admission on 10/16/2019   Component Date Value Ref Range Status   • Glucose 10/16/2019 178* 70 - 130 mg/dL Final   • Glucose 10/16/2019 159* 70 - 130 mg/dL Final   • Glucose 10/16/2019 148* 70 - 130 mg/dL Final   • Glucose 10/16/2019 170* 70 - 130 mg/dL Final   • Glucose 10/17/2019 164* 65 - 99 mg/dL Final   • BUN 10/17/2019 13  8 - 23 mg/dL Final   • Creatinine 10/17/2019 0.71  0.57 - 1.00 mg/dL Final   • Sodium 10/17/2019 132* 136 - 145 mmol/L Final   • Potassium 10/17/2019 3.9  3.5 - 5.2 mmol/L Final   • Chloride 10/17/2019 96* 98 - 107 mmol/L Final   • CO2 10/17/2019 24.1  22.0 - 29.0 mmol/L Final   • Calcium 10/17/2019 8.6  8.6 - 10.5 mg/dL Final   • eGFR Non African Amer 10/17/2019 83  >60 mL/min/1.73 Final   • BUN/Creatinine Ratio 10/17/2019 18.3  7.0 - 25.0 Final   • Anion Gap 10/17/2019 11.9  5.0 - 15.0 mmol/L Final   • Hemoglobin  10/17/2019 10.8* 12.0 - 15.9 g/dL Final   • Hematocrit 10/17/2019 32.1* 34.0 - 46.6 % Final   • Glucose 10/17/2019 183* 70 - 130 mg/dL Final       Imaging Results (last 72 hours)     Procedure Component Value Units Date/Time    XR Hip 1 View Without Pelvis Right (Surgery Only) [[id number]] Collected:  10/16/19 1110     Updated:  10/16/19 1113    Narrative:       RIGHT HIP X-RAYS     CLINICAL HISTORY: Evaluate arthroplasty.     2 AP views demonstrate a right total hip arthroplasty that appears in  satisfactory position.     This report was finalized on 10/16/2019 11:10 AM by Dr. Tuan Taylor M.D.             Personally viewed ortho images and report     Assessment:  Doing well 1 Day Post-Op following total joint replacement  Acute Blood Loss Anemia, stable  Post-operative Pain  Limited mobility, requires use of walker and assistance when OOB.    Patient Active Problem List   Diagnosis   • Intractable vomiting with nausea   • Lactic acidosis   • Diabetes mellitus due to underlying condition with diabetic autonomic neuropathy, without long-term current use of insulin (CMS/[facility])   • Hyponatremia   • Hypothyroid   • Essential (primary) hypertension   • Anxiety   • Breast mass   • Breast pain   • Luetscher's syndrome   • DM II (diabetes mellitus, type II), controlled (CMS/[facility])   • Encounter for screening colonoscopy   • History of breast cancer   • Syncope   • UTI (urinary tract infection)   • Arthritis of right hip        Plan:  Continue to monitor labs and/or v/s, for tolerance to post op blood loss.  Continue efforts to increase mobilization.  Continue Pain Control Measures.  Continue incisional Care.  DVT prophylaxis.  Follow up in office with Paul Hercules M.D. In 2 weeks.    Discharge Plan:today to home, home health and when cleared by physical therapy as safe for discharge    Date: 10/17/2019  Vesta Lund, APRN

## 2019-10-17 NOTE — PLAN OF CARE
Problem: Patient Care Overview  Goal: Plan of Care Review  Outcome: Ongoing (interventions implemented as appropriate)   10/17/19 8029   Coping/Psychosocial   Plan of Care Reviewed With patient   OTHER   Outcome Summary VSS. Pain controlled with PO pain med. Dressing c/d/i. Up to chair and worked with PT today. Ambulates with assist and walker. Voiding per BRP. Discussed bp med and monitoring r/tHNT. DIscharging home with HH.    Plan of Care Review   Progress improving       Problem: Fall Risk (Adult)  Goal: Absence of Fall  Outcome: Ongoing (interventions implemented as appropriate)      Problem: Hip Arthroplasty (Total, Partial) (Adult)  Goal: Signs and Symptoms of Listed Potential Problems Will be Absent, Minimized or Managed (Hip Arthroplasty)  Outcome: Ongoing (interventions implemented as appropriate)    Goal: Anesthesia/Sedation Recovery  Outcome: Ongoing (interventions implemented as appropriate)

## 2019-10-17 NOTE — DISCHARGE SUMMARY
" Orthopedic Discharge Summary      Patient: Jennie Lofton  YOB: 1955  Medical Record Number: [id number]    Attending Physician: Paul Hercules MD  Consulting Physician(s):   Consults     No orders found for last 30 day(s).       none      Date of Admission: 10/16/2019  6:16 AM  Date of Discharge: 10/17/2019    Discharge Diagnosis: RIGHT TOTAL HIP ARTHROPLASTY GUY NAVIGATION,   Acute Blood Loss Anemia, stable  Post-operative Pain  Limited mobility, requires use of walker and assistance when OOB.    Presenting Problem/History of Present Illness: Arthritis of right hip [M16.11]  Arthritis of right hip [M16.11]        Allergies:   Allergies   Allergen Reactions   • Metformin And Related GI Intolerance     Admitted x2 with lactic acidosis   • Codeine Anxiety, Dizziness and Nausea Only   • Letrozole Rash   • Naproxen Anxiety and Dizziness   • Other Rash     METAL ALLERGY  \"PT STATES MIGHT BE NICKEL JUST NOT SURE\".       Discharge Medications       Discharge Medications      New Medications      Instructions Start Date   apixaban 2.5 MG tablet tablet  Commonly known as:  ELIQUIS   2.5 mg, Oral, Every 12 Hours Scheduled      bisacodyl 5 MG EC tablet  Commonly known as:  DULCOLAX   10 mg, Oral, Daily PRN      docusate sodium 100 MG capsule  Commonly known as:  COLACE   100 mg, Oral, 2 Times Daily      HYDROcodone-acetaminophen 5-325 MG per tablet  Commonly known as:  NORCO   1 tabs po q 3-4 hr prn severe pain, wean as tolerated      polyethylene glycol pack packet  Commonly known as:  MIRALAX   17 g, Oral, Daily   Start Date:  10/18/2019        Changes to Medications      Instructions Start Date   glipizide 5 MG ER tablet  Commonly known as:  GLUCOTROL XL  What changed:  when to take this   5 mg, Oral, Daily         Continue These Medications      Instructions Start Date   accu-chek soft touch lancets   Test once daily      glucose blood test strip  Commonly known as:  ACCU-CHEK JASSI PLUS   Use as " instructed test once daily dx e11.9      JANUVIA 100 MG tablet  Generic drug:  SITagliptin   TAKE ONE TABLET BY MOUTH DAILY      levothyroxine 150 MCG tablet  Commonly known as:  SYNTHROID, LEVOTHROID   150 mcg, Oral, Daily      lisinopril 20 MG tablet  Commonly known as:  PRINIVIL,ZESTRIL   20 mg, Oral, Daily      methocarbamol 750 MG tablet  Commonly known as:  ROBAXIN   750 mg tab at bedtime and up to 4 times a day as needed for muscle spasms      ondansetron ODT 4 MG disintegrating tablet  Commonly known as:  ZOFRAN-ODT   4 mg, Oral, Every 8 Hours PRN         Stop These Medications    CHLORHEXIDINE GLUCONATE CLOTH EX     mupirocin 2 % ointment  Commonly known as:  BACTROBAN     naproxen sodium 220 MG tablet  Commonly known as:  ALEVE     PRESERVISION AREDS 2 PO     vitamin D3 5000 units capsule capsule              Past Medical History:   Diagnosis Date   • Arthritis    • Breast cancer (CMS/HCC) 2012    HAD BREAST LUMPECTOMY   • Diabetes mellitus (CMS/HCC)    • Hypertension    • Hypothyroid    • Muscle spasm of right leg    • Neuropathy    • Right hip pain         Past Surgical History:   Procedure Laterality Date   • BREAST LUMPECTOMY Right     right 2012        Social History     Occupational History   • Not on file   Tobacco Use   • Smoking status: Former Smoker     Years: 4.00   • Smokeless tobacco: Never Used   • Tobacco comment: SMOKED IN COLLEGE   Substance and Sexual Activity   • Alcohol use: Yes     Comment: rare   • Drug use: No   • Sexual activity: Defer      Social History     Social History Narrative   • Not on file        Family History   Problem Relation Age of Onset   • Diabetes Mother    • Diabetes Sister    • Skin cancer Father    • Malig Hyperthermia Neg Hx          Physical Exam: 64 y.o. female   Body mass index is 31.2 kg/m².  Facility age limit for growth percentiles is 20 years.  Vitals:    10/17/19 0728   BP: 99/63   Pulse: 89   Resp: 16   Temp: 98.6 °F (37 °C)   SpO2: 98%         General  Appearance:    Alert, cooperative, in no acute distress                      Vitals:    10/16/19 2022 10/16/19 2318 10/17/19 0235 10/17/19 0728   BP: 92/59 112/70 116/57 99/63   BP Location: Left arm Left arm Left arm Left arm   Patient Position: Lying Lying Lying Lying   Pulse: 75 71 80 89   Resp: 16 16 16 16   Temp: 98.7 °F (37.1 °C) 98.6 °F (37 °C) 98 °F (36.7 °C) 98.6 °F (37 °C)   TempSrc: Oral Oral Oral Oral   SpO2: 98% 99% 91% 98%   Weight:       Height:            DIAGNOSTIC TESTS:   Admission on 10/16/2019   Component Date Value Ref Range Status   • Glucose 10/16/2019 178* 70 - 130 mg/dL Final   • Glucose 10/16/2019 159* 70 - 130 mg/dL Final   • Glucose 10/16/2019 148* 70 - 130 mg/dL Final   • Glucose 10/16/2019 170* 70 - 130 mg/dL Final   • Glucose 10/17/2019 164* 65 - 99 mg/dL Final   • BUN 10/17/2019 13  8 - 23 mg/dL Final   • Creatinine 10/17/2019 0.71  0.57 - 1.00 mg/dL Final   • Sodium 10/17/2019 132* 136 - 145 mmol/L Final   • Potassium 10/17/2019 3.9  3.5 - 5.2 mmol/L Final   • Chloride 10/17/2019 96* 98 - 107 mmol/L Final   • CO2 10/17/2019 24.1  22.0 - 29.0 mmol/L Final   • Calcium 10/17/2019 8.6  8.6 - 10.5 mg/dL Final   • eGFR Non African Amer 10/17/2019 83  >60 mL/min/1.73 Final   • BUN/Creatinine Ratio 10/17/2019 18.3  7.0 - 25.0 Final   • Anion Gap 10/17/2019 11.9  5.0 - 15.0 mmol/L Final   • Hemoglobin 10/17/2019 10.8* 12.0 - 15.9 g/dL Final   • Hematocrit 10/17/2019 32.1* 34.0 - 46.6 % Final   • Glucose 10/17/2019 183* 70 - 130 mg/dL Final       Imaging Results (last 72 hours)     Procedure Component Value Units Date/Time    XR Hip 1 View Without Pelvis Right (Surgery Only) [[id number]] Collected:  10/16/19 1110     Updated:  10/16/19 1113    Narrative:       RIGHT HIP X-RAYS     CLINICAL HISTORY: Evaluate arthroplasty.     2 AP views demonstrate a right total hip arthroplasty that appears in  satisfactory position.     This report was finalized on 10/16/2019 11:10 AM by Dr. Dickerson  DOMINGO Taylor.             Hospital Course:  64 y.o. female admitted to [facility] to services of Paul Hercules MD with Arthritis of right hip [M16.11]  Arthritis of right hip [M16.11] on 10/16/2019 and underwent RIGHT TOTAL HIP ARTHROPLASTY GUY NAVIGATION  Per Paul Hercules MD. Antibiotic and VTE prophylaxis were per [facility] protocols. Post-operatively the patient transferred to the post-operative floor where the patient underwent mobilization therapy that included active as well as passive ROM exercises. Opioids were titrated to achieve appropriate pain management to allow for participation in mobilization exercises. Vital signs are now stable. On the day of discharge the wound was clean, dry and intact and calf was soft and non tender and Homans sign was negative. Operative extremity neurovascular status remains intact.   Appropriate education re: incision care, activity levels, medications, and follow up visits was completed and all questions were answered. The patient is now deemed stable for discharge.    Condition on Discharge:  Stable    Total Joint Replacement Discharge Instructions: Patient is to continue with physical therapy exercises twice daily and continue working with the physical therapist as ordered  and should be up walking every 2 hours during the day in addition to the physical therapy exercises. Patient may weight bear as tolerated unless otherwise specified. Continue to ice regularly. Do frequent ankle pumping exercises while you are sitting with legs elevated.  Patient also instructed on deep breathing, coughing, and using  incentive spirometer during hospitalization and encouraged to continue to use at home regularly.      I. INCISION CARE:   For Total Hip Replacement: Do not change dressing unless saturated until 10-14 days post op.  On POD#5 May shower but keep dressing in place and pat dry.  May remove dressing on post op day #10-14 and shower:  the wound should be gently  cleaned with antibacterial soap then allowed to dry. Do NOT scrub the glue at the incision site, pat dry after shower. If there is any drainage, the wound should be covered at all times until drainage has stopped and incision is sealed off.  Call if any unusual drainage past POD#5, pus, redness, or coming apart at the edge.     General Care TIPS:  ? Wash your hands prior to dressing changes  ? Change the dressing as needed to keep incision clean and dry. Utilize dry gauze and paper tape. Avoid touching the side of the gauze that goes against the incision with your hands.  ? No creams or ointments to the incision, lotion may be applied to the other parts of the leg to keep skin moist and intact.  ? May remove dressing once the incision is free of drainage  ? Do not touch or pick at the incision  ? Check incision and notify surgeon immediately if any of the following signs or symptoms are noted:  o Increase in redness  o Increase in swelling around the incision and of the entire extremity  o Increase in pain  o Drainage oozing from the incision  o Pulling apart of the edges of the incision  o Increase in overall body temperature (greater than 100.5 degrees) Make sure you are doing your incentive spirometer and deep breathing exercises every day while awake as this is the most frequent cause of low grade fever post operatively.  ? Your suture or staple removal  will be done in the office at your follow-up visit.        II. ACTIVITIES:  1. Exercises:  ? Complete exercise program as taught by the hospital physical therapist 2 times per day  ? Exercise program will be advanced by the physical therapist  ? During the day be up ambulating every 2 hours (while awake) for short distances  ? Complete the ankle pump exercises at least 10 times per hour (while awake)  ? Elevate legs most of the day the first week post operatively and thereafter elevate legs when in bed and for at least 30 minutes during the day. Caution must be  taken to avoid pillow placement under the bend of the knee as this can led to flexion contractures of the knee.  ? Use cold packs 20-30 minutes approximately 5 times per day. This should be done before and after completing your exercises and at any time you are experiencing pain/ stiffness in your operative extremity.      2. Activities of Daily Living:  ? No tub baths, hot tubs, or swimming pools for 6 weeks.  ? May shower and let water run over the incision on post-operative day #5 if no drainage. Do not scrub or rub the incision. Simply let the water run over the incision and pat dry.    III. Restrictions  ? Do not cross legs or kneel  ? Your surgeon will discuss with you when you will be able to drive again.  ? Weight bearing is as tolerated  ? First week stay inside on even terrain. May go up and down stairs one stair at a time utilizing the hand rail  ? After one week, you may venture outside.    IV. Precautions:  ? Everyone that comes near you should wash their hands  ? No elective dental, genital-urinary, or colon procedures or surgical procedures for 12 weeks after surgery unless absolutely necessary.  ?  If dental work or surgical procedure is deemed absolutely necessary, you will need to request antibiotics from your dentist as you will need to take antibiotics 1 hour prior to any dental work (including teeth cleanings).  ? Please discuss with your surgeon prophylactic antibiotics as the length of time this intervention will be necessary for you varies with each patient’s health history and situation, but the minimum is 2 years following a total joint replacement.  ? Avoid sick people. If you must be around someone who is ill, they should wear a mask.  ? Avoid visits to the Emergency Room or Urgent Care unless you are having a life threatening event.       V. Medications:   1. Anticoagulants: You will be discharged on an anticoagulant. This is a prophylactic medication that helps prevent blood clots  during your post-operative period. The type and length of dosage varies based on your individual needs, procedure performed, and surgeon’s preference.  ? While taking the anticoagulant, you should avoid taking any additional aspirin, ibuprofen (Advil or Motrin), Aleve (Naprosyn) or other non-steroidal anti-inflammatory medications.   ? Notify surgeon immediately if any angel bleeding is noted in the urine, stool, emesis, or from the nose or the incision. Blood in the stool will often appear as black rather than red. Blood in urine may appear as pink. Blood in emesis may appear as brown/black like coffee grounds.  ? You will need to apply pressure for longer periods of time to any cuts or abrasions to stop bleeding  ? Avoid alcohol while taking anticoagulants    2. Stool Softeners: You will be at greater risk of constipation after surgery due to being less mobile and the pain medications.   ? Take stool softeners as instructed by your surgeon while on pain medications. Over the counter Miralax 1-2 times daily, or Colace 100 mg 1-2 capsules twice daily.   ? If stools become too loose or too frequent, please decreases the dosage or stop the stool softener.  ? If constipation occurs despite use of stool softeners, you are to continue the stool softeners and add a laxative (Milk of Magnesia 1 ounce daily as needed)  ? Drink plenty of fluids, and eat fruits and vegetables during your recovery time    3. Pain Medications utilized after surgery are narcotics and the law requires that the following information be given to all patients that are prescribed narcotics:  ? CLASSIFICATION: Pain medications are called Opioids and are narcotics  ? LEGALITIES: It is illegal to share narcotics with others and to drive within 24 hours of taking narcotics  ? POTENTIAL SIDE EFFECTS: Potential side effects of opioids include: nausea, vomiting, itching, dizziness, drowsiness, dry mouth, constipation, and difficulty urinating.  ? POTENTIAL  ADVERSE EFFECTS:   o Opioid tolerance can develop with use of pain medications and this simply means that it requires more and more of the medication to control pain; however, this is seen more in patients that use opioids for longer periods of time.  o Opioid dependence can develop with use of Opioids and this simply means that to stop the medication can cause withdrawal symptoms; however, this is seen with patients that use Opioids for longer periods of time.  o Opioid addiction can develop with use of Opioids and the incidence of this is very unlikely in patients who take the medications as ordered and stop the medications as instructed.  o Opioid overdose can be dangerous, but is unlikely when the medication is taken as ordered and stopped when ordered. It is important not to mix opioids with alcohol or with and type of sedative such as Benadryl as this can lead to over sedation and respiratory difficulty.  ? DOSAGE:   o Pain medications will need to be taken consistently for the first week to decrease pain and promote adequate pain relief and participation in physical therapy.  o After the initial surgical pain begins to resolve, you may begin to decrease the pain medication. By the end of 6-8 weeks, you should be off of pain medications.  o Refills will not be given by the office during evening hours, on weekends, or after 8 weeks post-op.  o To seek refills on pain medications during the initial 6 week post-operative period, you must call the office 48 hours in advance to request the refill. The office will then notify you when to  the prescription. DO NOT wait until you are out of the medication to request a refill.    VI. FOLLOW-UP VISITS:  ? Follow up in the office with OLEG Hawkins in 2 weeks - If already scheduled (see Future Appointments) for date and time, if not yet scheduled, patient to call the office at [phone] to schedule. Prescriptions were given for pain medication, nausea,  constipation, and blood thinner therapy.    ? If you have any concerns or suspected complications prior to your follow up visit, please call your surgeon's office. Do not wait until your appointment time if you suspect complications. These will need to be addressed in the office promptly.      Future Appointments   Date Time Provider Department Center   10/29/2019  2:20 PM Vesta Lund APRN [facility] [address] None     Additional Instructions for the Follow-ups that You Need to Schedule     Discharge Follow-up with Specialty: Dr. Paul Rosales or OLEG Hawkins at [facility] [phone]; 2 Weeks   As directed      Specialty:  Dr. Paul Rosales or OLEG Hawkins at [facility] [phone]    Follow Up:  2 Weeks         Referral to Home Health   As directed      Face to Face Visit Date:  10/16/2019    Follow-up provider for Plan of Care?:  I will be treating the patient on an ongoing basis.  Please send me the Plan of Care for signature.    Follow-up provider:  PAUL ROSALES [[id number]]    Reason/Clinical Findings:  Post operative pain with ambulation, requires use of walker for ambulation    Describe mobility limitations that make leaving home difficult:  Requires assist of another to leave home    Nursing/Therapeutic Services Requested:  Physical Therapy    PT orders:  Total joint pathway    Frequency:  1 Week 1               Discharge Disposition Plan:today to home, home health and when cleared by physical therapy as safe for discharge    Date: 10/17/2019    OLEG Cardoso

## 2019-10-17 NOTE — PROGRESS NOTES
Continued Stay Note  [facility]     Patient Name: Jennie Lofton  MRN: [id number]  Today's Date: 10/17/2019    Admit Date: 10/16/2019    Discharge Plan     Row Name 10/17/19 1514       Plan    Plan  [facility]    Patient/Family in Agreement with Plan  yes    Plan Comments  Spoke with pt, verified correct information on facesheet and explained the role of CCP. Pt would like to d/c home with [facility], referral given to Carly with [facility] who states they are able to accept. Plan will be to d/c home with [facility] and family support.        Discharge Codes    No documentation.       Expected Discharge Date and Time     Expected Discharge Date Expected Discharge Time    Oct 17, 2019             Zahra Mas RN

## 2019-10-17 NOTE — THERAPY DISCHARGE NOTE
Acute Care - Occupational Therapy Initial Eval/Discharge  [facility]     Patient Name: Jennie Lofton  : 1955  MRN: [id number]  Today's Date: 10/17/2019               Admit Date: 10/16/2019       ICD-10-CM ICD-9-CM   1. Arthritis of right hip M16.11 716.95   2. Non-intractable vomiting without nausea, unspecified vomiting type R11.11 787.03     Patient Active Problem List   Diagnosis   • Intractable vomiting with nausea   • Lactic acidosis   • Diabetes mellitus due to underlying condition with diabetic autonomic neuropathy, without long-term current use of insulin (CMS/[facility])   • Hyponatremia   • Hypothyroid   • Essential (primary) hypertension   • Anxiety   • Breast mass   • Breast pain   • Luetscher's syndrome   • DM II (diabetes mellitus, type II), controlled (CMS/[facility])   • Encounter for screening colonoscopy   • History of breast cancer   • Syncope   • UTI (urinary tract infection)   • Arthritis of right hip     Past Medical History:   Diagnosis Date   • Arthritis    • Breast cancer (CMS/HCC) 2012    HAD BREAST LUMPECTOMY   • Diabetes mellitus (CMS/HCC)    • Hypertension    • Hypothyroid    • Muscle spasm of right leg    • Neuropathy    • Right hip pain      Past Surgical History:   Procedure Laterality Date   • BREAST LUMPECTOMY Right     right    • TOTAL HIP ARTHROPLASTY Right 10/16/2019    Procedure: RIGHT TOTAL HIP ARTHROPLASTY GUY NAVIGATION;  Surgeon: Paul Hercules MD;  Location: [facility];  Service: Orthopedics          OT ASSESSMENT FLOWSHEET (last 12 hours)      Occupational Therapy Evaluation     Row Name 10/17/19 1151                   OT Evaluation Time/Intention    Document Type  evaluation;discharge evaluation/summary  -SG        Mode of Treatment  individual therapy;occupational therapy  -SG        Patient Effort  good  -SG           General Information    Patient Profile Reviewed?  yes  -SG        Patient Observations  alert;cooperative;agree to therapy  -SG         Prior Level of Function  independent:;ADL's  -SG        Existing Precautions/Restrictions  hip, posterior  -SG           Cognitive Assessment/Intervention- PT/OT    Orientation Status (Cognition)  oriented x 4  -SG        Follows Commands (Cognition)  WFL  -SG           ADL Assessment/Intervention    BADL Assessment/Intervention  lower body dressing;bathing  -SG           Bathing Assessment/Intervention    Bathing [place] Level  bathing skills  -SG        Bathing Position  supported sitting  -SG        Comment (Bathing)  educated with hip safety and AE to assist, demonstrated hip precautions  -SG           Lower Body Dressing Assessment/Training    Lower Body Dressing [place] Level  lower body dressing skills  -SG        Lower Body Dressing Position  supported sitting  -SG        Comment (Lower Body Dressing)  educated with hip precautions and AE to assist  -SG           BADL Safety/Performance    Skilled BADL Treatment/Intervention  BADL process/adaptation training;adaptive equipment training  -SG           General ROM    GENERAL ROM COMMENTS  BUE's WFL AROM  -SG           Sensory Assessment/Intervention    Sensory General Assessment  no sensation deficits identified BUE's  -SG           Positioning and Restraints    Pre-Treatment Position  sitting in chair/recliner  -SG        Post Treatment Position  chair  -SG        In Chair  call light within reach;encouraged to call for assist  -SG           Pain Assessment    Additional Documentation  Pain Scale: Word Pre/Post-Treatment (Group)  -SG           Pain Scale: Word Pre/Post-Treatment    Pain: Word Scale, Pretreatment  2 - mild pain  -SG        Pain: Word Scale, Post-Treatment  2 - mild pain  -SG           Wound 10/16/19 0917 Right hip Incision    Wound - Properties Group Date first assessed: 10/16/19  - Time first assessed: 0917  - Side: Right  - Location: hip  - Primary Wound Type: Incision  -HH       Clinical Impression (OT)    OT Diagnosis   need for assist with personal care  -SG        Care Plan Review (OT)  evaluation/treatment results reviewed  -SG           Patient Education Goal (OT)    Activity (Patient Education Goal, OT)  Pt to state good knowledge for hip precautions and AE to assist with adl  -SG        [place]/Cues/Accuracy (Memory Goal 2, OT)  verbalizes understanding  -SG        Time Frame (Patient Education Goal, OT)  1 day  -SG        Progress/Outcome (Patient Education Goal, OT)  goal met  -SG          User Key  (r) = Recorded By, (t) = Taken By, (c) = Cosigned By    Initials Name Effective Dates    Erica Regan OTR 06/08/18 -      Yesenia Zhao RN 06/16/16 -               OT Recommendation and Plan     Plan of Care Review  Plan of Care Reviewed With: patient  Plan of Care Reviewed With: patient  Outcome Summary: Pt states good knowledge for hip precautions and AE to assist with adl. Pt states spouse will assist as needed. Pt states has reacher, long shoe horn, elevated commode. Will not follow for further OT at this time     Rehab Goal Summary     Row Name 10/17/19 1151             Patient Education Goal (OT)    Activity (Patient Education Goal, OT)  Pt to state good knowledge for hip precautions and AE to assist with adl  -SG      [place]/Cues/Accuracy (Memory Goal 2, OT)  verbalizes understanding  -SG      Time Frame (Patient Education Goal, OT)  1 day  -SG      Progress/Outcome (Patient Education Goal, OT)  goal met  -SG        User Key  (r) = Recorded By, (t) = Taken By, (c) = Cosigned By    Initials Name Provider Type Discipline    Erica Regan, OTR Occupational Therapist OT          Outcome Measures     Row Name 10/17/19 1208             How much help from another is currently needed...    Putting on and taking off regular lower body clothing?  2  -SG      Bathing (including washing, rinsing, and drying)  2  -SG      Toileting (which includes using toilet bed pan or urinal)  3  -SG      Putting on  and taking off regular upper body clothing  3  -SG      Taking care of personal grooming (such as brushing teeth)  3  -SG      Eating meals  4  -SG      AM-PAC 6 Clicks Score (OT)  17  -SG         Functional Assessment    Outcome Measure Options  AM-PAC 6 Clicks Daily Activity (OT)  -SG        User Key  (r) = Recorded By, (t) = Taken By, (c) = Cosigned By    Initials Name Provider Type    Erica Regan OTR Occupational Therapist          Time Calculation:   Time Calculation- OT     Row Name 10/17/19 1205             Time Calculation- OT    OT Start Time  1031  -SG      OT Stop Time  1048  -SG      OT Time Calculation (min)  17 min  -      Total Timed Code Minutes- OT  9 minute(s)  -      OT Received On  10/17/19  -        User Key  (r) = Recorded By, (t) = Taken By, (c) = Cosigned By    Initials Name Provider Type    Erica Regan OTR Occupational Therapist        Therapy Suggested Charges     Code   Minutes Charges    None           Therapy Charges for Today     Code Description Service Date Service Provider Modifiers Qty    96062286482  OT EVAL LOW COMPLEXITY 2 10/17/2019 Erica Olivera OTR GO 1    45207939672  OT SELF CARE/MGMT/TRAIN EA 15 MIN 10/17/2019 Erica Olivera OTR GO 1                    LUCIANO Oviedo  10/17/2019

## 2019-10-17 NOTE — PLAN OF CARE
Problem: Patient Care Overview  Goal: Plan of Care Review  Outcome: Ongoing (interventions implemented as appropriate)   10/17/19 4487   Coping/Psychosocial   Plan of Care Reviewed With patient   OTHER   Outcome Summary POD 0 RTHA. VSS, pain well controlled with prn norco and dilaudid for breakthrough pain, denies any GI upset at this time. tolerates amb well with assist x1, walker, and gait belt. plansto d/c home with HH 10/17. discussed blood glucose monitoring r/t hx DM.    Plan of Care Review   Progress improving     Goal: Individualization and Mutuality  Outcome: Ongoing (interventions implemented as appropriate)    Goal: Discharge Needs Assessment  Outcome: Ongoing (interventions implemented as appropriate)      Problem: Fall Risk (Adult)  Goal: Identify Related Risk Factors and Signs and Symptoms  Outcome: Outcome(s) achieved Date Met: 10/17/19    Goal: Absence of Fall  Outcome: Ongoing (interventions implemented as appropriate)      Problem: Pain, Acute (Adult)  Goal: Identify Related Risk Factors and Signs and Symptoms  Outcome: Outcome(s) achieved Date Met: 10/17/19    Goal: Acceptable Pain Control/Comfort Level  Outcome: Ongoing (interventions implemented as appropriate)      Problem: Hip Arthroplasty (Total, Partial) (Adult)  Goal: Signs and Symptoms of Listed Potential Problems Will be Absent, Minimized or Managed (Hip Arthroplasty)  Outcome: Ongoing (interventions implemented as appropriate)    Goal: Anesthesia/Sedation Recovery  Outcome: Ongoing (interventions implemented as appropriate)      Problem: Skin Injury Risk (Adult)  Goal: Identify Related Risk Factors and Signs and Symptoms  Outcome: Outcome(s) achieved Date Met: 10/17/19    Goal: Skin Health and Integrity  Outcome: Ongoing (interventions implemented as appropriate)

## 2019-10-17 NOTE — PLAN OF CARE
Problem: Patient Care Overview  Goal: Plan of Care Review  Outcome: Ongoing (interventions implemented as appropriate)   10/17/19 1200   Coping/Psychosocial   Plan of Care Reviewed With patient   OTHER   Outcome Summary Pt states good knowledge for hip precautions and AE to assist with adl. Pt states spouse will assist as needed. Pt states has reacher, long shoe horn, elevated commode. Will not follow for further OT at this time

## 2019-10-17 NOTE — PLAN OF CARE
Problem: Patient Care Overview  Goal: Plan of Care Review  Outcome: Ongoing (interventions implemented as appropriate)   10/17/19 4616   Coping/Psychosocial   Plan of Care Reviewed With patient   OTHER   Outcome Summary pt tolerated treatment with c/o pain. Pt able to perform THR protocol exercises with minimal c/o difficulty. Pt ambulated 70 feet with rwx, CGA. Pt demo understanding of ascend/descend 4 steps with HR, CGA.   Plan of Care Review   Progress improving

## 2019-10-17 NOTE — THERAPY TREATMENT NOTE
Patient Name: Jennie Lofton  : 1955    MRN: [id number]                              Today's Date: 10/17/2019       Admit Date: 10/16/2019    Visit Dx:     ICD-10-CM ICD-9-CM   1. Arthritis of right hip M16.11 716.95   2. Non-intractable vomiting without nausea, unspecified vomiting type R11.11 787.03     Patient Active Problem List   Diagnosis   • Intractable vomiting with nausea   • Lactic acidosis   • Diabetes mellitus due to underlying condition with diabetic autonomic neuropathy, without long-term current use of insulin (CMS/[facility])   • Hyponatremia   • Hypothyroid   • Essential (primary) hypertension   • Anxiety   • Breast mass   • Breast pain   • Luetscher's syndrome   • DM II (diabetes mellitus, type II), controlled (CMS/[facility])   • Encounter for screening colonoscopy   • History of breast cancer   • Syncope   • UTI (urinary tract infection)   • Arthritis of right hip     Past Medical History:   Diagnosis Date   • Arthritis    • Breast cancer (CMS/HCC) 2012    HAD BREAST LUMPECTOMY   • Diabetes mellitus (CMS/HCC)    • Hypertension    • Hypothyroid    • Muscle spasm of right leg    • Neuropathy    • Right hip pain      Past Surgical History:   Procedure Laterality Date   • BREAST LUMPECTOMY Right     right    • TOTAL HIP ARTHROPLASTY Right 10/16/2019    Procedure: RIGHT TOTAL HIP ARTHROPLASTY GUY NAVIGATION;  Surgeon: Paul Hercules MD;  Location: [facility];  Service: Orthopedics     General Information     Row Name 10/17/19 1253          PT Evaluation Time/Intention    Document Type  therapy note (daily note)  -     Mode of Treatment  physical therapy;group therapy  -     Row Name 10/17/19 1253          General Information    Existing Precautions/Restrictions  hip;hip, posterior;right  -     Row Name 10/17/19 1253          Cognitive Assessment/Intervention- PT/OT    Orientation Status (Cognition)  oriented x 4  -       User Key  (r) = Recorded By, (t) = Taken By, (c) = Cosigned By     Initials Name Provider Type     Evelyn Schaeffer PTA Physical Therapy Assistant        Mobility     Row Name 10/17/19 1347          Sit-Stand Transfer    Sit-Stand [place] (Transfers)  contact guard  -     Assistive Device (Sit-Stand Transfers)  walker, front-wheeled  -     Row Name 10/17/19 1347          Gait/Stairs Assessment/Training    [place] Level (Gait)  contact guard;verbal cues  -     Assistive Device (Gait)  walker, front-wheeled  -     Distance in Feet (Gait)  70  -EH     Pattern (Gait)  step-to  -EH     Deviations/Abnormal Patterns (Gait)  antalgic;gait speed decreased;stride length decreased  -     Right Sided Gait Deviations  -- pt invert foot when stepping down.   -     [place] Level (Stairs)  contact guard  -     Handrail Location (Stairs)  left side (ascending)  -     Number of Steps (Stairs)  4  -EH     Ascending Technique (Stairs)  step-to-step  -EH     Descending Technique (Stairs)  step-to-step  -EH     Row Name 10/17/19 1347          Mobility Assessment/Intervention    Extremity Weight-bearing Status  right lower extremity  -     Right Lower Extremity (Weight-bearing Status)  weight-bearing as tolerated (WBAT)  -       User Key  (r) = Recorded By, (t) = Taken By, (c) = Cosigned By    Initials Name Provider Type     Evelyn Schaeffer PTA Physical Therapy Assistant        Obj/Interventions     Row Name 10/17/19 1349          Therapeutic Exercise    Comment (Therapeutic Exercise)  R JAIME protocol X15 reps  -       User Key  (r) = Recorded By, (t) = Taken By, (c) = Cosigned By    Initials Name Provider Type     Evelyn Schaeffer PTA Physical Therapy Assistant        Goals/Plan    No documentation.       Clinical Impression     Row Name 10/17/19 1350          Pain Scale: Numbers Pre/Post-Treatment    Pain Scale: Numbers, Pretreatment  7/10  -EH     Pain Location - Side  Right  -EH     Pain Location  hip  -EH     Pain Intervention(s)  Ambulation/increased  activity;Repositioned  -     Row Name 10/17/19 1350          Plan of Care Review    Plan of Care Reviewed With  patient  -     Row Name 10/17/19 1983          Positioning and Restraints    Pre-Treatment Position  sitting in chair/recliner  -     Post Treatment Position  chair  -EH     In Chair  reclined;call light within reach;encouraged to call for assist;exit alarm on  -EH       User Key  (r) = Recorded By, (t) = Taken By, (c) = Cosigned By    Initials Name Provider Type     Evelyn Schaeffer PTA Physical Therapy Assistant        Outcome Measures    No documentation.       Physical Therapy Education     Title: PT OT SLP Therapies (In Progress)     Topic: Physical Therapy (In Progress)     Point: Mobility training (In Progress)     Learning Progress Summary           Patient Acceptance, E, NR by MS at 10/16/2019  4:22 PM    Acceptance, E, VU,NR by MS at 10/16/2019  4:21 PM                   Point: Home exercise program (In Progress)     Learning Progress Summary           Patient Acceptance, E, NR by MS at 10/16/2019  4:22 PM    Acceptance, E, VU,NR by MS at 10/16/2019  4:21 PM                   Point: Body mechanics (In Progress)     Learning Progress Summary           Patient Acceptance, E, NR by MS at 10/16/2019  4:22 PM    Acceptance, E, VU,NR by MS at 10/16/2019  4:21 PM                   Point: Precautions (In Progress)     Learning Progress Summary           Patient Acceptance, E, NR by MS at 10/16/2019  4:22 PM    Acceptance, E, VU,NR by MS at 10/16/2019  4:21 PM                               User Key     Initials Effective Dates Name Provider Type [facility]    MS 03/04/19 -  Esperanza Parham PT Physical Therapist PT              PT Recommendation and Plan     Plan of Care Reviewed With: patient  Progress: improving  Outcome Summary: pt tolerated treatment with c/o pain. Pt able to perform THR protocol exercises with minimal c/o difficulty. Pt ambulated 70 feet with rwx, CGA. Pt demo understanding  of ascend/descend 4 steps with HR,  CGA.     Time Calculation:   PT Charges     Row Name 10/17/19 1253             Time Calculation    Start Time  0930  -      Stop Time  1007  -      Time Calculation (min)  37 min  -      PT Received On  10/17/19  -      PT - Next Appointment  10/18/19  -         Time Calculation- PT    Total Timed Code Minutes- PT  30 minute(s)  -        User Key  (r) = Recorded By, (t) = Taken By, (c) = Cosigned By    Initials Name Provider Type     Evelyn Schaeffer PTA Physical Therapy Assistant        Therapy Charges for Today     Code Description Service Date Service Provider Modifiers Qty    29147466412 HC PT THER PROC EA 15 MIN 10/17/2019 Evelyn Schaeffer PTA GP 1    13910981190 HC PT THER PROC GROUP 10/17/2019 Evelyn Schaeffer PTA GP 1    41948996589 HC PT THER PROC EA 15 MIN 10/17/2019 Eevlyn Schaeffer PTA GP 1    42211238014 HC PT THER PROC GROUP 10/17/2019 Evelyn Schaeffer PTA GP 1          PT G-Codes  Outcome Measure Options: AM-PAC 6 Clicks Daily Activity (OT)  AM-PAC 6 Clicks Score (PT): 16  AM-PAC 6 Clicks Score (OT): 17    Evelyn Schaeffer PTA  10/17/2019

## 2019-10-17 NOTE — DISCHARGE PLACEMENT REQUEST
"Jennie Castorena (64 y.o. Female)     Date of Birth Social Security Number Address Home Phone MRN    1955  [address] [address] [phone] [id number]    Tenriism Marital Status          Mu-ism        Admission Date Admission Type Admitting Provider Attending Provider Department, Room/Bed    10/16/19 Elective Paul Hercules MD Makk, Stephen P, MD [address], P890/1    Discharge Date Discharge Disposition Discharge Destination                       Attending Provider:  Paul Hercules MD    Allergies:  Metformin And Related, Codeine, Letrozole, Naproxen, Other    Isolation:  None   Infection:  None   Code Status:  CPR    Ht:  170.2 cm (67\")   Wt:  90.4 kg (199 lb 3 oz)    Admission Cmt:  None   Principal Problem:  Arthritis of right hip [M16.11] More...                 Active Insurance as of 10/16/2019     Primary Coverage     Payor Plan Insurance Group Employer/Plan Group    [employer] EMPLOYEE [id number]     Payor Plan Address Payor Plan Phone Number Payor Plan Fax Number Effective Dates    [address] [phone]  1/1/2015 - None Entered    [place] [address]       Subscriber Name Subscriber Birth Date Member ID       JENNIE CASTORENA 1955 [id number]                 Emergency Contacts      (Rel.) Home Phone Work Phone Mobile Phone    JoseNino harris (Spouse) -- -- [phone]          "

## 2019-10-17 NOTE — DISCHARGE INSTRUCTIONS
Total Joint Replacement Discharge Instructions: Patient is to continue with physical therapy exercises twice daily and continue working with the physical therapist as ordered  and should be up walking every 2 hours during the day in addition to the physical therapy exercises. Patient may weight bear as tolerated unless otherwise specified. Continue to ice regularly. Do frequent ankle pumping exercises while you are sitting with legs elevated.  Patient also instructed on deep breathing, coughing, and using  incentive spirometer during hospitalization and encouraged to continue to use at home regularly.       I. INCISION CARE:              For Total Hip Replacement: Do not change dressing unless saturated until 10-14 days post op.  On POD#5 May shower but keep dressing in place and pat dry.  May remove dressing on post op day #10-14 and shower:  the wound should be gently cleaned with antibacterial soap then allowed to dry. Do NOT scrub the glue at the incision site, pat dry after shower. If there is any drainage, the wound should be covered at all times until drainage has stopped and incision is sealed off.  Call if any unusual drainage past POD#5, pus, redness, or coming apart at the edge.                 General Care TIPS:  · Wash your hands prior to dressing changes  · Change the dressing as needed to keep incision clean and dry. Utilize dry gauze and paper tape. Avoid touching the side of the gauze that goes against the incision with your hands.  · No creams or ointments to the incision, lotion may be applied to the other parts of the leg to keep skin moist and intact.  · May remove dressing once the incision is free of drainage  · Do not touch or pick at the incision  · Check incision and notify surgeon immediately if any of the following signs or symptoms are noted:  ? Increase in redness  ? Increase in swelling around the incision and of the entire extremity  ? Increase in pain  ? Drainage oozing from the  incision  ? Pulling apart of the edges of the incision  ? Increase in overall body temperature (greater than 100.5 degrees) Make sure you are doing your incentive spirometer and deep breathing exercises every day while awake as this is the most frequent cause of low grade fever post operatively.  · Your suture or staple removal  will be done in the office at your follow-up visit.           II. ACTIVITIES:  1. Exercises:  · Complete exercise program as taught by the hospital physical therapist 2 times per day  · Exercise program will be advanced by the physical therapist  · During the day be up ambulating every 2 hours (while awake) for short distances  · Complete the ankle pump exercises at least 10 times per hour (while awake)  · Elevate legs most of the day the first week post operatively and thereafter elevate legs when in bed and for at least 30 minutes during the day. Caution must be taken to avoid pillow placement under the bend of the knee as this can led to flexion contractures of the knee.  · Use cold packs 20-30 minutes approximately 5 times per day. This should be done before and after completing your exercises and at any time you are experiencing pain/ stiffness in your operative extremity.        2. Activities of Daily Living:  · No tub baths, hot tubs, or swimming pools for 6 weeks.  · May shower and let water run over the incision on post-operative day #5 if no drainage. Do not scrub or rub the incision. Simply let the water run over the incision and pat dry.     III. Restrictions  · Do not cross legs or kneel  · Your surgeon will discuss with you when you will be able to drive again.  · Weight bearing is as tolerated  · First week stay inside on even terrain. May go up and down stairs one stair at a time utilizing the hand rail  · After one week, you may venture outside.     IV. Precautions:  · Everyone that comes near you should wash their hands  · No elective dental, genital-urinary, or colon  procedures or surgical procedures for 12 weeks after surgery unless absolutely necessary.  ·  If dental work or surgical procedure is deemed absolutely necessary, you will need to request antibiotics from your dentist as you will need to take antibiotics 1 hour prior to any dental work (including teeth cleanings).  · Please discuss with your surgeon prophylactic antibiotics as the length of time this intervention will be necessary for you varies with each patient’s health history and situation, but the minimum is 2 years following a total joint replacement.  · Avoid sick people. If you must be around someone who is ill, they should wear a mask.  · Avoid visits to the Emergency Room or Urgent Care unless you are having a life threatening event.         V. Medications:   1. Anticoagulants: You will be discharged on an anticoagulant. This is a prophylactic medication that helps prevent blood clots during your post-operative period. The type and length of dosage varies based on your individual needs, procedure performed, and surgeon’s preference.  · While taking the anticoagulant, you should avoid taking any additional aspirin, ibuprofen (Advil or Motrin), Aleve (Naprosyn) or other non-steroidal anti-inflammatory medications.   · Notify surgeon immediately if any angel bleeding is noted in the urine, stool, emesis, or from the nose or the incision. Blood in the stool will often appear as black rather than red. Blood in urine may appear as pink. Blood in emesis may appear as brown/black like coffee grounds.  · You will need to apply pressure for longer periods of time to any cuts or abrasions to stop bleeding  · Avoid alcohol while taking anticoagulants     2. Stool Softeners: You will be at greater risk of constipation after surgery due to being less mobile and the pain medications.   · Take stool softeners as instructed by your surgeon while on pain medications. Over the counter Miralax 1-2 times daily, or Colace 100  mg 1-2 capsules twice daily.   · If stools become too loose or too frequent, please decreases the dosage or stop the stool softener.  · If constipation occurs despite use of stool softeners, you are to continue the stool softeners and add a laxative (Milk of Magnesia 1 ounce daily as needed)  · Drink plenty of fluids, and eat fruits and vegetables during your recovery time     3. Pain Medications utilized after surgery are narcotics and the law requires that the following information be given to all patients that are prescribed narcotics:  · CLASSIFICATION: Pain medications are called Opioids and are narcotics  · LEGALITIES: It is illegal to share narcotics with others and to drive within 24 hours of taking narcotics  · POTENTIAL SIDE EFFECTS: Potential side effects of opioids include: nausea, vomiting, itching, dizziness, drowsiness, dry mouth, constipation, and difficulty urinating.  · POTENTIAL ADVERSE EFFECTS:   ? Opioid tolerance can develop with use of pain medications and this simply means that it requires more and more of the medication to control pain; however, this is seen more in patients that use opioids for longer periods of time.  ? Opioid dependence can develop with use of Opioids and this simply means that to stop the medication can cause withdrawal symptoms; however, this is seen with patients that use Opioids for longer periods of time.  ? Opioid addiction can develop with use of Opioids and the incidence of this is very unlikely in patients who take the medications as ordered and stop the medications as instructed.  ? Opioid overdose can be dangerous, but is unlikely when the medication is taken as ordered and stopped when ordered. It is important not to mix opioids with alcohol or with and type of sedative such as Benadryl as this can lead to over sedation and respiratory difficulty.  · DOSAGE:   ? Pain medications will need to be taken consistently for the first week to decrease pain and promote  adequate pain relief and participation in physical therapy.  ? After the initial surgical pain begins to resolve, you may begin to decrease the pain medication. By the end of 6-8 weeks, you should be off of pain medications.  ? Refills will not be given by the office during evening hours, on weekends, or after 8 weeks post-op.  ? To seek refills on pain medications during the initial 6 week post-operative period, you must call the office 48 hours in advance to request the refill. The office will then notify you when to  the prescription. DO NOT wait until you are out of the medication to request a refill.     VI. FOLLOW-UP VISITS:  · Follow up in the office with OLEG Hawkins in 2 weeks - If already scheduled (see Future Appointments) for date and time, if not yet scheduled, patient to call the office at [phone] to schedule. Prescriptions were given for pain medication, nausea, constipation, and blood thinner therapy.     · If you have any concerns or suspected complications prior to your follow up visit, please call your surgeon's office. Do not wait until your appointment time if you suspect complications. These will need to be addressed in the office promptly.

## 2019-10-18 ENCOUNTER — READMISSION MANAGEMENT (OUTPATIENT)
Dept: CALL CENTER | Facility: HOSPITAL | Age: 64
End: 2019-10-18

## 2019-10-18 NOTE — OUTREACH NOTE
Prep Survey      Responses   Facility patient discharged from?  [facility]   Is patient eligible?  Yes   Discharge diagnosis  Arthritis of right hip, right total hip arthroplasty [facility]   Does the patient have one of the following disease processes/diagnoses(primary or secondary)?  Total Joint Replacement   Does the patient have Home health ordered?  Yes   What is the Home health agency?   [facility]   Is there a DME ordered?  Yes   What DME was ordered?  [facility]   Comments regarding appointments  See AVS   Prep survey completed?  Yes          Anyi Noriega RN

## 2019-10-21 ENCOUNTER — READMISSION MANAGEMENT (OUTPATIENT)
Dept: CALL CENTER | Facility: HOSPITAL | Age: 64
End: 2019-10-21

## 2019-10-21 NOTE — OUTREACH NOTE
Total Joint Week 1 Survey      Responses   Facility patient discharged from?  [facility]   Does the patient have one of the following disease processes/diagnoses(primary or secondary)?  Total Joint Replacement   Is there a successful TCM telephone encounter documented?  No   Joint surgery performed?  Hip   Week 1 attempt successful?  Yes   Call start time  1225   Call end time  1243   Has the patient been back in either the hospital or Emergency Department since discharge?  No   Discharge diagnosis  Arthritis of right hip, right total hip arthroplasty [facility]   Is patient permission given to speak with other caregiver?  No   Does the patient have all medications related to this admission filled (includes all antibiotics, pain medications, etc.)  Yes   Is the patient taking all medications as directed (includes completed medication regime)?  Yes   Is the patient able to teach back alternate methods of pain control?  Ice, Correct alignment   Does the patient have a follow up appointment with their surgeon?  Yes   Has the patient kept scheduled appointments due by today?  N/A   What is the Home health agency?   [facility]   Has home health visited the patient within 72 hours of discharge?  Yes   What DME was ordered?  [facility]   Has all DME been delivered?  Yes   Has the patient began therapy sessions (either in the home or as an out patient)?  Yes   If the patient has started attending therapy, what post op day did they begin to attend (either in home or as an out patient)?    Home PT 2 times a week. Started POD #2   Does the patient have a wound vac in place?  N/A   Has the patient fallen since discharge?  No   Did the patient receive a copy of their discharge instructions?  Yes   Nursing interventions  Reviewed instructions with patient   What is the patient's perception of their functional status since discharge?  Improving   Is the patient able to teach back signs and symptoms of infection?  Temp >100.4 for 24h  or longer, Incisional drainage, Increased swelling or redness around incision (not associated with surgical edema), Severe discomfort or pain, Changes in mobility, Shortness of breath or chest pain   Is the patient able to teach back how to prevent infection?  Check incision daily, Wash hands before and after touching incision, Keep incision covered if drainage, Shower only as directed by surgeon   Is the patient able to teach back signs and symptoms of DVT?  Redness in calf, Area hot to touch, Shortness of breath or chest pain, Severe pain in calf, Swelling in calf   Is the patient able to teach back home safety measures?  Modifications with ADLs such as dressing, cooking, toileting, Ability to shower [Patient plans to shower today with  present. ]   Did the patient implement home safety suggestions from pre-surgery classes if attended?  Yes   Is the patient/caregiver able to teach back the hierarchy of who to call/visit for symptoms/problems? PCP, Specialist, Home health nurse, Urgent Care, ED, 911  Yes   Week 1 call completed?  Yes          Anyi Thacker RN

## 2019-10-25 DIAGNOSIS — E11.9 TYPE 2 DIABETES MELLITUS WITHOUT COMPLICATION, WITHOUT LONG-TERM CURRENT USE OF INSULIN (HCC): ICD-10-CM

## 2019-10-25 RX ORDER — GLIPIZIDE 5 MG/1
TABLET, FILM COATED, EXTENDED RELEASE ORAL
Qty: 90 TABLET | Refills: 1 | Status: SHIPPED | OUTPATIENT
Start: 2019-10-25 | End: 2020-05-07

## 2019-10-29 ENCOUNTER — OFFICE VISIT (OUTPATIENT)
Dept: ORTHOPEDIC SURGERY | Facility: CLINIC | Age: 64
End: 2019-10-29

## 2019-10-29 VITALS — BODY MASS INDEX: 31.39 KG/M2 | TEMPERATURE: 98.2 F | WEIGHT: 200 LBS | HEIGHT: 67 IN

## 2019-10-29 DIAGNOSIS — M16.11 ARTHRITIS OF RIGHT HIP: Primary | ICD-10-CM

## 2019-10-29 DIAGNOSIS — Z96.641 STATUS POST RIGHT HIP REPLACEMENT: ICD-10-CM

## 2019-10-29 PROCEDURE — 73502 X-RAY EXAM HIP UNI 2-3 VIEWS: CPT | Performed by: NURSE PRACTITIONER

## 2019-10-29 PROCEDURE — 99024 POSTOP FOLLOW-UP VISIT: CPT | Performed by: NURSE PRACTITIONER

## 2019-10-29 RX ORDER — HYDROCODONE BITARTRATE AND ACETAMINOPHEN 5; 325 MG/1; MG/1
TABLET ORAL
Qty: 36 TABLET | Refills: 0 | Status: SHIPPED | OUTPATIENT
Start: 2019-10-29 | End: 2019-11-07

## 2019-10-29 NOTE — PATIENT INSTRUCTIONS
DIAGNOSIS: 2 week follow up right total hip     SUBJECTIVE:Patient returns today for 2 week follow up of right total hip replacement. Patient reports doing well with no unusual complaints. Appears to be progressing appropriately.    OBJECTIVE:   Exam:. The incision is healing appropriately. No sign of infection. Range of motion is progressing as expected. The calf is soft and nontender with a negative Homans sign.    DIAGNOSTIC STUDIES  2V AP&Lat of the right hip were done in the office today  Indication, findings and comparison: images were reviewed for evaluation of recent hip replacement. They demonstrate a well positioned, well aligned hip replacement without complicating factors noted. In comparison with previous films there has been interval implant placement.    ASSESSMENT: 2 week status post right hip replacement expected healing.    PLAN: 1) Incision open to air.   2) Order given for PT and pain medicine (as needed), take muscle relaxer at bedtime for the next month.    3)Continue ice PRN   4) Continue Eliquis 2.5mg by mouth twice a day until 30 days out from surgery.    5) Follow up in 4 weeks with repeat Xrays of right hip (2 views)   6) Wait for 3 months after surgery for routine teeth cleaning and continue with taking a dose of antibiotics before dental procedures for 2 yrs post total joint replacement.    Vesta Lund, APRN  10/29/2019     Pain Medications utilized after surgery are narcotics and the law requires that the following information be given to all patients that are prescribed narcotics:    CLASSIFICATION: Pain medications are called Opioids and are narcotics  LEGALITIES: It is illegal to share narcotics with others and to drive within 4 hours of taking narcotics  POTENTIAL SIDE EFFECTS: Potential side effects of opioids include: nausea, vomiting, itching, dizziness, drowsiness, dry mouth, constipation, and difficulty urinating.  POTENTIAL ADVERSE EFFECTS:   Opioid tolerance can develop  with use of pain medications and this simply means that it requires more and more of the medication to control pain; however, this is seen more in patients that use opioids for longer periods of time.  Opioid dependence can develop with use of Opioids and this simply means that to stop the medication can cause withdrawal symptoms so wean gradually (do not stop all at once); however, this is seen more with patients that use opioids for longer periods of time.  Opioid addiction can develop with use of Opioids and the incidence of this is very unlikely in patients who take the medications as ordered and stop the medications as instructed.  Opioid overdose can be dangerous, but is unlikely when the medication is taken as ordered and stopped when ordered. It is important not to mix opioids with alcohol or with and type of sedative such as Benadryl as this can lead to over sedation and respiratory difficulty.    DOSAGE:   Pain medications will need to be taken consistently for the first week to decrease pain and promote adequate pain relief and participation in physical therapy.    After the initial surgical pain begins to resolve, you may begin to decrease the pain medication. By the end of 8 weeks, you should be off of pain medications.    Refills will not be given by the office during evening hours, on weekends.  To seek refills on pain medications during the initial 6 week post-operative period, you must call the office 48 hours in advance to request the refill. The office will then notify you when to  the prescription. DO NOT wait until you are out of the medication to request a refill.    A OSCAR check will be made on-line, and will be repeated if prescription is renewed after a 90 day period. The patient agrees to adhering to the medication regimen as prescribed.

## 2019-10-29 NOTE — PROGRESS NOTES
Jennie Lofton : 1955 MRN: [id number] DATE: 10/29/2019  Body mass index is 31.32 kg/m².  Vitals:    10/29/19 1409   Temp: 98.2 °F (36.8 °C)         DIAGNOSIS: 2 week follow up right total hip     SUBJECTIVE:Patient returns today for 2 week follow up of right total hip replacement. Patient reports doing well with no unusual complaints. Appears to be progressing appropriately.    OBJECTIVE:   Exam:. The incision is healing appropriately. No sign of infection. Range of motion is progressing as expected. The calf is soft and nontender with a negative Homans sign.    DIAGNOSTIC STUDIES  2V AP&Lat of the right hip were done in the office today  Indication, findings and comparison: images were reviewed for evaluation of recent hip replacement. They demonstrate a well positioned, well aligned hip replacement without complicating factors noted. In comparison with previous films there has been interval implant placement.    ASSESSMENT: 2 week status post right hip replacement expected healing.    PLAN: 1) Incision open to air.   2) Order given for PT and pain medicine (as needed), take muscle relaxer at bedtime for the next month.    3)Continue ice PRN   4) Continue Eliquis 2.5mg by mouth twice a day until 30 days out from surgery.    5) Follow up in 4 weeks with repeat Xrays of right hip (2 views)   6) Wait for 3 months after surgery for routine teeth cleaning and continue with taking a dose of antibiotics before dental procedures for 2 yrs post total joint replacement.    Vesta Lund, APRN  10/29/2019     Pain Medications utilized after surgery are narcotics and the law requires that the following information be given to all patients that are prescribed narcotics:    CLASSIFICATION: Pain medications are called Opioids and are narcotics  LEGALITIES: It is illegal to share narcotics with others and to drive within 4 hours of taking narcotics  POTENTIAL SIDE EFFECTS: Potential side effects of opioids include:  nausea, vomiting, itching, dizziness, drowsiness, dry mouth, constipation, and difficulty urinating.  POTENTIAL ADVERSE EFFECTS:   Opioid tolerance can develop with use of pain medications and this simply means that it requires more and more of the medication to control pain; however, this is seen more in patients that use opioids for longer periods of time.  Opioid dependence can develop with use of Opioids and this simply means that to stop the medication can cause withdrawal symptoms so wean gradually (do not stop all at once); however, this is seen more with patients that use opioids for longer periods of time.  Opioid addiction can develop with use of Opioids and the incidence of this is very unlikely in patients who take the medications as ordered and stop the medications as instructed.  Opioid overdose can be dangerous, but is unlikely when the medication is taken as ordered and stopped when ordered. It is important not to mix opioids with alcohol or with and type of sedative such as Benadryl as this can lead to over sedation and respiratory difficulty.    DOSAGE:   Pain medications will need to be taken consistently for the first week to decrease pain and promote adequate pain relief and participation in physical therapy.    After the initial surgical pain begins to resolve, you may begin to decrease the pain medication. By the end of 8 weeks, you should be off of pain medications.    Refills will not be given by the office during evening hours, on weekends.  To seek refills on pain medications during the initial 6 week post-operative period, you must call the office 48 hours in advance to request the refill. The office will then notify you when to  the prescription. DO NOT wait until you are out of the medication to request a refill.    A OSCAR check will be made on-line, and will be repeated if prescription is renewed after a 90 day period. The patient agrees to adhering to the medication regimen  as prescribed.

## 2019-10-30 ENCOUNTER — READMISSION MANAGEMENT (OUTPATIENT)
Dept: CALL CENTER | Facility: HOSPITAL | Age: 64
End: 2019-10-30

## 2019-10-30 NOTE — OUTREACH NOTE
Total Joint Week 2 Survey      Responses   Facility patient discharged from?  [facility]   Does the patient have one of the following disease processes/diagnoses(primary or secondary)?  Total Joint Replacement   Joint surgery performed?  Hip   Week 2 attempt successful?  Yes   Call start time  1234   Call end time  1237   Has the patient been back in either the hospital or Emergency Department since discharge?  No   Discharge diagnosis  Arthritis of right hip, right total hip arthroplasty [facility]   Does the patient have all medications related to this admission filled (includes all antibiotics, pain medications, etc.)  Yes   Is the patient taking all medications as directed (includes completed medication regime)?  Yes   Is the patient able to teach back alternate methods of pain control?  Ice, Correct alignment   Does the patient have a follow up appointment with their surgeon?  Yes   Has the patient kept scheduled appointments due by today?  Yes   What is the Home health agency?   [facility]   Has home health visited the patient within 72 hours of discharge?  Yes   What DME was ordered?  [facility]   Has all DME been delivered?  Yes   Has the patient began therapy sessions (either in the home or as an out patient)?  Yes   If the patient has started attending therapy, what post op day did they begin to attend (either in home or as an out patient)?    Home PT 2 times a week.    Does the patient have a wound vac in place?  N/A   Has the patient fallen since discharge?  No   Comments  patient reports she is doing well.    Did the patient receive a copy of their discharge instructions?  Yes   Nursing interventions  Reviewed instructions with patient   What is the patient's perception of their functional status since discharge?  Improving   Is the patient able to teach back signs and symptoms of infection?  Temp >100.4 for 24h or longer, Incisional drainage, Increased swelling or redness around incision (not associated  with surgical edema), Severe discomfort or pain, Changes in mobility, Shortness of breath or chest pain   Is the patient able to teach back how to prevent infection?  Check incision daily, Wash hands before and after touching incision, Keep incision covered if drainage, Shower only as directed by surgeon   Is the patient able to teach back signs and symptoms of DVT?  Redness in calf, Area hot to touch, Shortness of breath or chest pain, Severe pain in calf, Swelling in calf   Is the patient/caregiver able to teach back the hierarchy of who to call/visit for symptoms/problems? PCP, Specialist, Home health nurse, Urgent Care, ED, 911  Yes   Week 2 call completed?  Yes          Adolfo Burgess RN

## 2019-11-01 DIAGNOSIS — E11.9 TYPE 2 DIABETES MELLITUS WITHOUT COMPLICATION, WITHOUT LONG-TERM CURRENT USE OF INSULIN (HCC): ICD-10-CM

## 2019-11-01 DIAGNOSIS — I10 ESSENTIAL (PRIMARY) HYPERTENSION: ICD-10-CM

## 2019-11-01 RX ORDER — LISINOPRIL 20 MG/1
TABLET ORAL
Qty: 90 TABLET | Refills: 0 | Status: SHIPPED | OUTPATIENT
Start: 2019-11-01 | End: 2020-03-03

## 2019-11-06 ENCOUNTER — TELEPHONE (OUTPATIENT)
Dept: ORTHOPEDIC SURGERY | Facility: CLINIC | Age: 64
End: 2019-11-06

## 2019-11-06 NOTE — TELEPHONE ENCOUNTER
I called and spoke with Jennie.  She is 3wks s/p Right Total hip replacement. She says her pain is not an issue, she is having severe anxiety that is keeping her from functioning and sleeping at night.  She stopped taking her Norco5/325 several days ago as she felt this was contributing to her anxiety and said it helped a little.   I encouraged her to Call Dr. Saenz her PCP and request to be seen today or tomorrow for this.  She denies wanting to harm herself.  She said she would call Dr. Saenz's office.  I also encouraged her to take tylenol several times a day and especially at bedtime as this will help her to sleep better and may help her with this as well.  She verbalized understanding of instructions.

## 2019-11-06 NOTE — TELEPHONE ENCOUNTER
SPOKE WITH ELVIN AT DR. WYMAN'S OFFICE.  SHE HAS AN APPT TOMORROW AT 9:15    PT WAS INFORMED OF THIS AND SAID SHE WOULD BE THERE.

## 2019-11-06 NOTE — TELEPHONE ENCOUNTER
"Nataliia - Physical Therapist at [facility] called to report \"patient's pain medication gave patient severe anxiety & panic attacks, patient quit taking pain medication [facility] 10/03/19 patient said pain is relatively under control but not sleeping at all & cried whole time at PT appt today 11/06/19 (not from pain but mental health).\" PT Nataliia can be reached at [phone] if questions. Thanks /srh  "

## 2019-11-07 ENCOUNTER — OFFICE VISIT (OUTPATIENT)
Dept: FAMILY MEDICINE CLINIC | Facility: CLINIC | Age: 64
End: 2019-11-07

## 2019-11-07 VITALS
DIASTOLIC BLOOD PRESSURE: 64 MMHG | TEMPERATURE: 98.4 F | BODY MASS INDEX: 31.39 KG/M2 | WEIGHT: 200 LBS | SYSTOLIC BLOOD PRESSURE: 128 MMHG | HEIGHT: 67 IN | HEART RATE: 91 BPM | OXYGEN SATURATION: 99 %

## 2019-11-07 DIAGNOSIS — F41.9 ANXIETY: Primary | ICD-10-CM

## 2019-11-07 PROCEDURE — 99213 OFFICE O/P EST LOW 20 MIN: CPT | Performed by: NURSE PRACTITIONER

## 2019-11-07 RX ORDER — ALPRAZOLAM 0.25 MG/1
0.25 TABLET ORAL 2 TIMES DAILY PRN
Qty: 60 TABLET | Refills: 0 | Status: SHIPPED | OUTPATIENT
Start: 2019-11-07 | End: 2020-10-14

## 2019-11-07 NOTE — PROGRESS NOTES
Subjective   Jennie Lofton is a 64 y.o. female who presents c/o increase in anxiety. Unable to talk without crying. Had hip surgery 3 weeks ago and is crying while talking in physical therapy. PT suggested follow up with PCP.     History of Present Illness   Has had anxiety off and on before. Pain pills were making worse, so had to get off them. Having trouble sleeping as well. Falls asleep, but when wakes, feels breathless. Can't talk without crying. Pain is bearable at this point. Feels near panic. Can't lay and sleep at all. Seemed to get better after stopped pain pills, but coming back in waves. No depressed thoughts. Just anxious. Not overly worrying, though anxious about upcoming little events, leaving house. Just panicky feeling, can't relax at all, worse in the dark, has to leave all the lights on. Never has been afraid of the dark. All anxiety stems from hospital stuff. Had anxiety after breast cancer, had xanax for a while and really helped. Sister on prozac for many years. After lactic acidosis had buspar and never seemed to help. Therapy in particular does not make her anxious, just everything.  The following portions of the patient's history were reviewed and updated as appropriate: allergies, current medications, past family history, past medical history, past social history, past surgical history and problem list.    Review of Systems   Constitutional: Negative for activity change, appetite change and unexpected weight gain.   HENT: Negative.    Respiratory: Negative for shortness of breath.    Cardiovascular: Negative.    Musculoskeletal: Positive for arthralgias (managable).   Skin: Negative.    Allergic/Immunologic: Negative.    Neurological: Negative for confusion.   Hematological: Negative.    Psychiatric/Behavioral: Positive for agitation, behavioral problems, sleep disturbance and stress. Negative for decreased concentration, dysphoric mood, self-injury, suicidal ideas, negative for  "hyperactivity and depressed mood. The patient is nervous/anxious.      /64   Pulse 91   Temp 98.4 °F (36.9 °C) (Oral)   Ht 170.2 cm (67\")   Wt 90.7 kg (200 lb)   SpO2 99%   BMI 31.32 kg/m²     Objective   Physical Exam   Constitutional: She appears well-developed and well-nourished. No distress.   Cardiovascular: Normal rate.   Pulmonary/Chest: Effort normal.   Skin: Skin is warm and dry.   Psychiatric: Her behavior is normal. Judgment and thought content normal. Her mood appears anxious. Her speech is rapid and/or pressured.   Tearful continuous.   Nursing note and vitals reviewed.    Assessment/Plan   Problems Addressed this Visit        Other    Anxiety - Primary        Anxiety--if still needing daily xanax in 1 week, recommend adding low dose sertraline, to call. Discussed increased risks BZD, increased in elderly, falls, addiction, all cause mortality. Reasonable for short term use only. Discussed Dr. Saenz does not prescribe these medications and if should need beyond 1 month, which I do not expect, will need to follow up with me.     OSCAR Report:      As part of this patient's treatment plan, I am prescribing controlled substances. The patient has been made aware of appropriate use of such medications, including potential risk of somnolence, limited ability to drive and /or work safely, and potential for dependence or overdose. It has also been made clear that these medications are for use by this patient only, without concomitant use of alcohol or other substances unless prescribed.    OSCAR report has been reviewed by: OLEG Vergara on 11/07/19.  The report was scanned into the patient's chart.    Date of last OSCAR:  11/7/2019             "

## 2019-11-13 ENCOUNTER — READMISSION MANAGEMENT (OUTPATIENT)
Dept: CALL CENTER | Facility: HOSPITAL | Age: 64
End: 2019-11-13

## 2019-11-13 NOTE — OUTREACH NOTE
Total Joint Month 1 Survey      Responses   Facility patient discharged from?  [facility]   Does the patient have one of the following disease processes/diagnoses(primary or secondary)?  Total Joint Replacement   Joint surgery performed?  Hip   Month 1 attempt successful?  Yes   Call start time  1646   Call end time  1648   Has the patient been back in either the hospital or Emergency Department since discharge?  No   Discharge diagnosis  Arthritis of right hip, right total hip arthroplasty [facility]   Is the patient taking all medications as directed (includes completed medication regime)?  Yes   Is the patient able to teach back alternate methods of pain control?  Correct alignment, Reposition [Needs no ice. Pain is controlled.]   Has the patient kept scheduled appointments due by today?  Yes   Is the patient still receiving Home Health Services?  No   Is the patient still attending therapy sessions(either in the home or as an outpatient)?  Yes   Has the patient fallen since discharge?  No   What is the patient's perception of their functional status since discharge?  Improving   Is the patient able to teach back signs and symptoms of infection?  Temp >100.4 for 24h or longer, Shortness of breath or chest pain   Month 1 call completed?  Yes          Jennie Neal RN

## 2019-11-26 ENCOUNTER — OFFICE VISIT (OUTPATIENT)
Dept: ORTHOPEDIC SURGERY | Facility: CLINIC | Age: 64
End: 2019-11-26

## 2019-11-26 VITALS — TEMPERATURE: 97.8 F | HEIGHT: 66 IN | BODY MASS INDEX: 31.66 KG/M2 | WEIGHT: 197 LBS

## 2019-11-26 DIAGNOSIS — Z96.641 STATUS POST RIGHT HIP REPLACEMENT: Primary | ICD-10-CM

## 2019-11-26 PROCEDURE — 73502 X-RAY EXAM HIP UNI 2-3 VIEWS: CPT | Performed by: NURSE PRACTITIONER

## 2019-11-26 PROCEDURE — 99024 POSTOP FOLLOW-UP VISIT: CPT | Performed by: NURSE PRACTITIONER

## 2019-11-26 NOTE — PATIENT INSTRUCTIONS
Chief Complaint and HPI: 6 weeks follow up right total hip     SUBJECTIVE:Patient returns today for follow up of total joint replacement. Patient reports doing well with no unusual complaints. Appears to be progressing appropriately.     OBJECTIVE:   Exam:. The incision is healing appropriately. No sign of infection. Range of motion is progressing as expected. The calf is soft and nontender with a negative Homans sign.     DIAGNOSTIC STUDIES  Imaging done today, images were personally viewed and discussed with the patient:     Indication, findings and comparison:2V AP&Lat of the operative joint were done in the office today  images were reviewed for evaluation of recent joint replacement. They demonstrate a well positioned, well aligned total joint replacement without complicating factors noted. In comparison with previous films there has been no alignment change.     ASSESSMENT: status post right total hip replacement expected healing.     PLAN: 1) Continue with PT exercises as prescribed              2) Follow up 3 MONTHS  WITH XRAYS (2 views of same joint).              3) Wait for 3 months after surgery for routine teeth cleaning and continue with taking a dose of antibiotics before dental procedures for 2 yrs post total joint replacement.              4) May discontinue anticoagulant therapy (such as aspirin or xarelto) from surgery at this time and resume medications, vitamins, and supplements you were taking before surgery.      Vesta Lund, APRN  11/26/2019

## 2019-11-26 NOTE — PROGRESS NOTES
Jennie Lofton : 1955 MRN: [id number] DATE: 2019  Body mass index is 31.8 kg/m².  Vitals:    19 1007   Temp: 97.8 °F (36.6 °C)       Chief Complaint and HPI: 6 weeks follow up right total hip    SUBJECTIVE:Patient returns today for follow up of total joint replacement. Patient reports doing well with no unusual complaints. Appears to be progressing appropriately.    OBJECTIVE:   Exam:. The incision is healing appropriately. No sign of infection. Range of motion is progressing as expected. The calf is soft and nontender with a negative Homans sign.    DIAGNOSTIC STUDIES  Imaging done today, images were personally viewed and discussed with the patient:    Indication, findings and comparison:2V AP&Lat of the operative joint were done in the office today  images were reviewed for evaluation of recent joint replacement. They demonstrate a well positioned, well aligned total joint replacement without complicating factors noted. In comparison with previous films there has been no alignment change.    ASSESSMENT: status post right total hip replacement expected healing.    PLAN: 1) Continue with PT exercises as prescribed   2) Follow up 3 MONTHS  WITH XRAYS (2 views of same joint).   3) Wait for 3 months after surgery for routine teeth cleaning and continue with taking a dose of antibiotics before dental procedures for 2 yrs post total joint replacement.   4) May discontinue anticoagulant therapy (such as aspirin or xarelto) from surgery at this time and resume medications, vitamins, and supplements you were taking before surgery.    5) Return to work .   Vesta Lund, APRN  2019

## 2019-12-13 ENCOUNTER — READMISSION MANAGEMENT (OUTPATIENT)
Dept: CALL CENTER | Facility: HOSPITAL | Age: 64
End: 2019-12-13

## 2019-12-13 NOTE — OUTREACH NOTE
Total Joint Month 2 Survey      Responses   Facility patient discharged from?  [facility]   Does the patient have one of the following disease processes/diagnoses(primary or secondary)?  Total Joint Replacement   Joint surgery performed?  Hip   Month 2 attempt successful?  No   Unsuccessful attempts  Attempt 1          Brittani Sandhu RN

## 2019-12-16 ENCOUNTER — READMISSION MANAGEMENT (OUTPATIENT)
Dept: CALL CENTER | Facility: HOSPITAL | Age: 64
End: 2019-12-16

## 2019-12-16 NOTE — OUTREACH NOTE
Total Joint Month 2 Survey      Responses   Facility patient discharged from?  [facility]   Does the patient have one of the following disease processes/diagnoses(primary or secondary)?  Total Joint Replacement   Joint surgery performed?  Hip   Month 2 attempt successful?  Yes   Call start time  1422   Call end time  1424   Has the patient been back in either the hospital or Emergency Department since discharge?  No   Discharge diagnosis  Arthritis of right hip, right total hip arthroplasty [facility]   Has the patient kept scheduled appointments due by today?  Yes   Is the patient still attending therapy sessions(either in the home or as an outpatient)?  Yes   Has the patient fallen since discharge?  No   What is the patient's perception of their functional status since discharge?  Improving   Is the patient/caregiver able to teach back the hierarchy of who to call/visit for symptoms/problems? PCP, Specialist, Home health nurse, Urgent Care, ED, 911  Yes   Additional teach back comments  Pt says she is doing great, no questions or concerns at this time.   Month 2 Call Completed?  Yes          Rena Stoner RN

## 2020-01-15 ENCOUNTER — TELEPHONE (OUTPATIENT)
Dept: ORTHOPEDIC SURGERY | Facility: CLINIC | Age: 65
End: 2020-01-15

## 2020-01-15 ENCOUNTER — TELEPHONE (OUTPATIENT)
Dept: FAMILY MEDICINE CLINIC | Facility: CLINIC | Age: 65
End: 2020-01-15

## 2020-01-15 NOTE — TELEPHONE ENCOUNTER
She has had a chest cold and been feeling under the weather for about 10 days and last night her bilat legs and hips were achy and the right hip was afraid it was going to give out on her, but today the hip is feeling better and is not having any difficulty with wt bearing and denies fever or chills.  Recommend seeing her PCP to make sure she does not have flu or other illness going on.  She has an appt tomorrow for this.  At this point the hip is feeling better and so suspicion for infection is low.  Likely achy with viral illness.  She thanked me for the call and will f/u with PCP, if any worsening she will let us know.

## 2020-01-16 ENCOUNTER — OFFICE VISIT (OUTPATIENT)
Dept: FAMILY MEDICINE CLINIC | Facility: CLINIC | Age: 65
End: 2020-01-16

## 2020-01-16 ENCOUNTER — READMISSION MANAGEMENT (OUTPATIENT)
Dept: CALL CENTER | Facility: HOSPITAL | Age: 65
End: 2020-01-16

## 2020-01-16 VITALS
HEIGHT: 66 IN | OXYGEN SATURATION: 98 % | SYSTOLIC BLOOD PRESSURE: 144 MMHG | TEMPERATURE: 98.1 F | WEIGHT: 202 LBS | DIASTOLIC BLOOD PRESSURE: 84 MMHG | HEART RATE: 64 BPM | BODY MASS INDEX: 32.47 KG/M2

## 2020-01-16 DIAGNOSIS — E08.43 DIABETES MELLITUS DUE TO UNDERLYING CONDITION WITH DIABETIC AUTONOMIC NEUROPATHY, WITHOUT LONG-TERM CURRENT USE OF INSULIN (HCC): ICD-10-CM

## 2020-01-16 DIAGNOSIS — M89.8X9 BONE PAIN: ICD-10-CM

## 2020-01-16 DIAGNOSIS — E87.20 LACTIC ACIDOSIS: ICD-10-CM

## 2020-01-16 DIAGNOSIS — E03.8 OTHER SPECIFIED HYPOTHYROIDISM: ICD-10-CM

## 2020-01-16 DIAGNOSIS — I10 ESSENTIAL (PRIMARY) HYPERTENSION: ICD-10-CM

## 2020-01-16 DIAGNOSIS — R39.9 UTI SYMPTOMS: Primary | ICD-10-CM

## 2020-01-16 DIAGNOSIS — R53.81 MALAISE AND FATIGUE: ICD-10-CM

## 2020-01-16 DIAGNOSIS — Z96.641 STATUS POST RIGHT HIP REPLACEMENT: ICD-10-CM

## 2020-01-16 DIAGNOSIS — R53.83 MALAISE AND FATIGUE: ICD-10-CM

## 2020-01-16 LAB
BILIRUB BLD-MCNC: NEGATIVE MG/DL
CLARITY, POC: CLEAR
COLOR UR: YELLOW
EXPIRATION DATE: NORMAL
FLUAV AG NPH QL: NEGATIVE
FLUBV AG NPH QL: NEGATIVE
GLUCOSE UR STRIP-MCNC: NEGATIVE MG/DL
INTERNAL CONTROL: NORMAL
KETONES UR QL: NEGATIVE
LEUKOCYTE EST, POC: NEGATIVE
Lab: NORMAL
NITRITE UR-MCNC: NEGATIVE MG/ML
PH UR: 5.5 [PH] (ref 5–8)
PROT UR STRIP-MCNC: NEGATIVE MG/DL
RBC # UR STRIP: NEGATIVE /UL
SP GR UR: 1 (ref 1–1.03)
UROBILINOGEN UR QL: NORMAL

## 2020-01-16 PROCEDURE — 99214 OFFICE O/P EST MOD 30 MIN: CPT | Performed by: NURSE PRACTITIONER

## 2020-01-16 PROCEDURE — 81002 URINALYSIS NONAUTO W/O SCOPE: CPT | Performed by: NURSE PRACTITIONER

## 2020-01-16 PROCEDURE — 87804 INFLUENZA ASSAY W/OPTIC: CPT | Performed by: NURSE PRACTITIONER

## 2020-01-16 NOTE — PROGRESS NOTES
Subjective   Jennie Lofton is a 64 y.o. female who presents to us today with bilateral leg muscle aches, weakness in legs, possible UTI and diabetes check up. Pt recently had hip replacement and surgeon wanted labs done on pt to make sure she doesn't have a bacterial infections.     Hypertension   This is a chronic problem. The current episode started more than 1 year ago. The problem has been waxing and waning since onset. The problem is controlled. Associated symptoms include blurred vision, malaise/fatigue and palpitations. Pertinent negatives include no chest pain or peripheral edema. (Dizziness  ) Agents associated with hypertension include decongestants, NSAIDs, thyroid hormones and oral contraceptives. Risk factors for coronary artery disease include dyslipidemia, diabetes mellitus, family history, obesity, post-menopausal state, smoking/tobacco exposure, stress and sedentary lifestyle.      Hip replacement 10/16/19, 3 weeks ago, got cough, but really didn't feel bad. Head feels fuzzy, eyes blurry, has dry mouth, legs weak and crampy, increased pain in R groin, told needed CRP and ESR by [facility]. Feels flushed and odd. Hip only gives pain when climbing stairs. Pain late in day hurts whenever walks.  Hx lactic acidosis no recent dental work.   Blood sugar this morning 150, when appetite was poor skipped glipizide a few days only, has restarted   The following portions of the patient's history were reviewed and updated as appropriate: allergies, current medications, past family history, past medical history, past social history, past surgical history and problem list.    Review of Systems   Constitutional: Positive for appetite change, fatigue and malaise/fatigue. Negative for chills and fever.   HENT: Negative for congestion, ear pain, rhinorrhea and sore throat.    Eyes: Positive for blurred vision. Negative for redness and visual disturbance.   Respiratory: Positive for cough (calming way down).   "  Cardiovascular: Positive for palpitations. Negative for chest pain and leg swelling.   Gastrointestinal: Negative for diarrhea.   Endocrine: Positive for polydipsia and polyuria. Negative for cold intolerance, heat intolerance and polyphagia.   Genitourinary: Positive for frequency. Negative for difficulty urinating and pelvic pressure.   Musculoskeletal: Positive for arthralgias and myalgias.   Skin: Negative.    Neurological: Positive for weakness. Negative for dizziness and headache.   Hematological: Negative.    Psychiatric/Behavioral: Negative.      /84 (BP Location: Left arm, Patient Position: Sitting, Cuff Size: Adult)   Pulse 64   Temp 98.1 °F (36.7 °C) (Oral)   Ht 167.6 cm (66\")   Wt 91.6 kg (202 lb)   SpO2 98%   BMI 32.60 kg/m²     Objective   Physical Exam   Constitutional: She is oriented to person, place, and time. She appears well-developed and well-nourished. No distress.   HENT:   Right Ear: External ear normal.   Left Ear: External ear normal.   Mouth/Throat: No oropharyngeal exudate.   Eyes: Right eye exhibits no discharge. No scleral icterus.   Neck: Normal range of motion. No tracheal deviation present. No thyromegaly present.   Cardiovascular: Normal rate, regular rhythm and normal heart sounds.   Pulmonary/Chest: Effort normal and breath sounds normal.   Abdominal: Soft. Bowel sounds are normal.    Jennie had a diabetic foot exam performed today.   During the foot exam she had a monofilament test performed (see scanned).  Lymphadenopathy:     She has no cervical adenopathy.   Neurological: She is alert and oriented to person, place, and time. She has normal strength. No cranial nerve deficit or sensory deficit. She displays a negative Romberg sign. Coordination and gait normal.   yusuf mercedes pike negative   Skin: Skin is warm. Capillary refill takes less than 2 seconds. No erythema.   Psychiatric: She has a normal mood and affect. Her behavior is normal. Judgment and thought content " normal.   Nursing note and vitals reviewed.    Assessment/Plan   Problems Addressed this Visit        Cardiovascular and Mediastinum    Essential (primary) hypertension    Relevant Orders    Comprehensive Metabolic Panel    CBC & Differential       Endocrine    Diabetes mellitus due to underlying condition with diabetic autonomic neuropathy, without long-term current use of insulin (CMS/[facility])    Relevant Orders    Hemoglobin A1c    Microalbumin / Creatinine Urine Ratio - Urine, Clean Catch    Hypothyroid    Relevant Orders    TSH       Other    Lactic acidosis    Relevant Orders    Lactic Acid, Plasma    Status post right hip replacement    Relevant Orders    C-reactive Protein    Sedimentation Rate      Other Visit Diagnoses     UTI symptoms    -  Primary    Relevant Orders    POC Urinalysis Dipstick (Completed)    Urine Culture - Urine, Urine, Clean Catch    Malaise and fatigue        Relevant Orders    POC Influenza A / B (Completed)    Bone pain        Relevant Orders    C-reactive Protein    Sedimentation Rate        HTN--controlled with medications--to continue and update medications  Diabetes--seems symptomatic, though her numbers reflect adequate or borderline control, update labs, treatment pending labs  Hypothyroid--check TSH, continue replacement  Survey for lactic acidosis--check labs  S/P hip replacement 3 months with increased pain--check inflammatory markers--no sign of infection at surgical site, no startup pain  UTI symptoms--UA with no sign of infection, flu screen negative  FU prn and as scheduled    Results for orders placed or performed in visit on 01/16/20   POC Urinalysis Dipstick   Result Value Ref Range    Color Yellow Yellow, Straw, Dark Yellow, Diana    Clarity, UA Clear Clear    Glucose, UA Negative Negative, 1000 mg/dL (3+) mg/dL    Bilirubin Negative Negative    Ketones, UA Negative Negative    Specific Gravity  1.005 (A) 1.005 - 1.030    Blood, UA Negative Negative    pH, Urine 5.5 5.0  - 8.0    Protein, POC Negative Negative mg/dL    Urobilinogen, UA Normal Normal    Leukocytes Negative Negative    Nitrite, UA Negative Negative   POC Influenza A / B   Result Value Ref Range    Rapid Influenza A Ag Negative Negative    Rapid Influenza B Ag Negative Negative    Internal Control Passed Passed    Lot Number [id number]     Expiration Date 10/22/21

## 2020-01-16 NOTE — OUTREACH NOTE
Total Joint Month 3 Survey      Responses   Facility patient discharged from?  [facility]   Does the patient have one of the following disease processes/diagnoses(primary or secondary)?  Total Joint Replacement   Joint surgery performed?  Hip   Month 3 attempt successful?  No   Unsuccessful attempts  Attempt 1          Anyi Thacker RN

## 2020-01-17 ENCOUNTER — DOCUMENTATION (OUTPATIENT)
Dept: ORTHOPEDIC SURGERY | Facility: CLINIC | Age: 65
End: 2020-01-17

## 2020-01-17 ENCOUNTER — TELEPHONE (OUTPATIENT)
Dept: FAMILY MEDICINE CLINIC | Facility: CLINIC | Age: 65
End: 2020-01-17

## 2020-01-17 LAB
ALBUMIN SERPL-MCNC: 4.6 G/DL (ref 3.5–5.2)
ALBUMIN/CREAT UR: <21.9 MG/G CREAT (ref 0–30)
ALBUMIN/GLOB SERPL: 1.9 G/DL
ALP SERPL-CCNC: 76 U/L (ref 39–117)
ALT SERPL-CCNC: 18 U/L (ref 1–33)
AST SERPL-CCNC: 15 U/L (ref 1–32)
BACTERIA UR CULT: NORMAL
BACTERIA UR CULT: NORMAL
BASOPHILS # BLD AUTO: 0.04 10*3/MM3 (ref 0–0.2)
BASOPHILS NFR BLD AUTO: 0.5 % (ref 0–1.5)
BILIRUB SERPL-MCNC: 0.4 MG/DL (ref 0.2–1.2)
BUN SERPL-MCNC: 15 MG/DL (ref 8–23)
BUN/CREAT SERPL: 17.9 (ref 7–25)
CALCIUM SERPL-MCNC: 9.7 MG/DL (ref 8.6–10.5)
CHLORIDE SERPL-SCNC: 98 MMOL/L (ref 98–107)
CO2 SERPL-SCNC: 25.4 MMOL/L (ref 22–29)
CREAT SERPL-MCNC: 0.84 MG/DL (ref 0.57–1)
CREAT UR-MCNC: 13.7 MG/DL
CRP SERPL-MCNC: 0.7 MG/DL (ref 0–0.5)
EOSINOPHIL # BLD AUTO: 0.06 10*3/MM3 (ref 0–0.4)
EOSINOPHIL NFR BLD AUTO: 0.8 % (ref 0.3–6.2)
ERYTHROCYTE [DISTWIDTH] IN BLOOD BY AUTOMATED COUNT: 13.5 % (ref 12.3–15.4)
ERYTHROCYTE [SEDIMENTATION RATE] IN BLOOD BY WESTERGREN METHOD: 7 MM/HR (ref 0–30)
GLOBULIN SER CALC-MCNC: 2.4 GM/DL
GLUCOSE SERPL-MCNC: 152 MG/DL (ref 65–99)
HBA1C MFR BLD: 7.1 % (ref 4.8–5.6)
HCT VFR BLD AUTO: 38.7 % (ref 34–46.6)
HGB BLD-MCNC: 13.4 G/DL (ref 12–15.9)
IMM GRANULOCYTES # BLD AUTO: 0.07 10*3/MM3 (ref 0–0.05)
IMM GRANULOCYTES NFR BLD AUTO: 0.9 % (ref 0–0.5)
LACTATE SERPL-MCNC: 0.7 MMOL/L (ref 0.5–2)
LYMPHOCYTES # BLD AUTO: 1.12 10*3/MM3 (ref 0.7–3.1)
LYMPHOCYTES NFR BLD AUTO: 14.5 % (ref 19.6–45.3)
MCH RBC QN AUTO: 29.5 PG (ref 26.6–33)
MCHC RBC AUTO-ENTMCNC: 34.6 G/DL (ref 31.5–35.7)
MCV RBC AUTO: 85.2 FL (ref 79–97)
MICROALBUMIN UR-MCNC: <3 UG/ML
MONOCYTES # BLD AUTO: 0.37 10*3/MM3 (ref 0.1–0.9)
MONOCYTES NFR BLD AUTO: 4.8 % (ref 5–12)
NEUTROPHILS # BLD AUTO: 6.07 10*3/MM3 (ref 1.7–7)
NEUTROPHILS NFR BLD AUTO: 78.5 % (ref 42.7–76)
NRBC BLD AUTO-RTO: 0 /100 WBC (ref 0–0.2)
PLATELET # BLD AUTO: 273 10*3/MM3 (ref 140–450)
POTASSIUM SERPL-SCNC: 4.3 MMOL/L (ref 3.5–5.2)
PROT SERPL-MCNC: 7 G/DL (ref 6–8.5)
RBC # BLD AUTO: 4.54 10*6/MM3 (ref 3.77–5.28)
SODIUM SERPL-SCNC: 136 MMOL/L (ref 136–145)
TSH SERPL DL<=0.005 MIU/L-ACNC: 19.8 UIU/ML (ref 0.27–4.2)
WBC # BLD AUTO: 7.73 10*3/MM3 (ref 3.4–10.8)

## 2020-01-17 NOTE — PROGRESS NOTES
Notes recorded by Li Holcomb APRN on 1/17/2020 at 7:22 AM EST  Please call the patient regarding her abnormal result. Thyroid very under replaced. Skipping doses?? If not then increase levothyroxine to 175mcq daily and recheck TSH 6-8 weeks. Should start feeling better. Blood sugar fair control, not quite to goal with A1c 7.1, work on diet, continue same medications. Lactate normal. CRP borderline elevated, but sed rate normal and WBC normal, will fax to Dr. Hercules as well regarding your increase in R hip pain. Follow up with him if pain persists, fogginess should clear with increasing thyroid medication.    From ortho standpoint:  I called and reviewed her infection markers which are reassuring from infection standpoint, but she has not been continuing her PT exercises so she will restart with the strengthening exercises.  She will discuss other test results with her PCP.

## 2020-01-17 NOTE — PROGRESS NOTES
Please call the patient regarding her abnormal result. Thyroid very under replaced. Skipping doses?? If not then increase levothyroxine to 175mcq daily and recheck TSH 6-8 weeks. Should start feeling better. Blood sugar fair control, not quite to goal with A1c 7.1, work on diet, continue same medications. Lactate normal. CRP borderline elevated, but sed rate normal and WBC normal, will fax to Dr. Hercules as well regarding your increase in R hip pain. Follow up with him if pain persists, fogginess should clear with increasing thyroid medication.

## 2020-01-21 RX ORDER — SITAGLIPTIN 100 MG/1
TABLET, FILM COATED ORAL
Qty: 90 TABLET | Refills: 0 | Status: SHIPPED | OUTPATIENT
Start: 2020-01-21 | End: 2020-04-16

## 2020-01-22 ENCOUNTER — READMISSION MANAGEMENT (OUTPATIENT)
Dept: CALL CENTER | Facility: HOSPITAL | Age: 65
End: 2020-01-22

## 2020-01-22 DIAGNOSIS — E03.8 OTHER SPECIFIED HYPOTHYROIDISM: Primary | ICD-10-CM

## 2020-01-22 RX ORDER — LEVOTHYROXINE SODIUM 175 UG/1
175 TABLET ORAL DAILY
Qty: 30 TABLET | Refills: 1 | Status: SHIPPED | OUTPATIENT
Start: 2020-01-22 | End: 2020-03-17

## 2020-01-22 NOTE — OUTREACH NOTE
Total Joint Month 3 Survey      Responses   Facility patient discharged from?  [facility]   Does the patient have one of the following disease processes/diagnoses(primary or secondary)?  Total Joint Replacement   Joint surgery performed?  Hip   Month 3 attempt successful?  No   Unsuccessful attempts  Attempt 2          Jennifer Rausch RN

## 2020-02-17 ENCOUNTER — OFFICE VISIT (OUTPATIENT)
Dept: ORTHOPEDIC SURGERY | Facility: CLINIC | Age: 65
End: 2020-02-17

## 2020-02-17 VITALS — BODY MASS INDEX: 31.98 KG/M2 | WEIGHT: 199 LBS | TEMPERATURE: 98.3 F | HEIGHT: 66 IN

## 2020-02-17 DIAGNOSIS — M54.31 RIGHT SIDED SCIATICA: ICD-10-CM

## 2020-02-17 DIAGNOSIS — Z96.641 STATUS POST RIGHT HIP REPLACEMENT: Primary | ICD-10-CM

## 2020-02-17 DIAGNOSIS — G89.29 CHRONIC SI JOINT PAIN: ICD-10-CM

## 2020-02-17 DIAGNOSIS — M53.3 CHRONIC SI JOINT PAIN: ICD-10-CM

## 2020-02-17 PROCEDURE — 73501 X-RAY EXAM HIP UNI 1 VIEW: CPT | Performed by: NURSE PRACTITIONER

## 2020-02-17 PROCEDURE — 99213 OFFICE O/P EST LOW 20 MIN: CPT | Performed by: NURSE PRACTITIONER

## 2020-02-17 NOTE — PATIENT INSTRUCTIONS
Back Exercises  These exercises help to make your trunk and back strong. They also help to keep the lower back flexible. Doing these exercises can help to prevent back pain or lessen existing pain.  · If you have back pain, try to do these exercises 2-3 times each day or as told by your doctor.  · As you get better, do the exercises once each day. Repeat the exercises more often as told by your doctor.  · To stop back pain from coming back, do the exercises once each day, or as told by your doctor.  Exercises  Single knee to chest  Do these steps 3-5 times in a row for each le. Lie on your back on a firm bed or the floor with your legs stretched out.  2. Bring one knee to your chest.  3. Grab your knee or thigh with both hands and hold them it in place.  4. Pull on your knee until you feel a gentle stretch in your lower back or buttocks.  5. Keep doing the stretch for 10-30 seconds.  6. Slowly let go of your leg and straighten it.  Pelvic tilt  Do these steps 5-10 times in a row:  1. Lie on your back on a firm bed or the floor with your legs stretched out.  2. Bend your knees so they point up to the ceiling. Your feet should be flat on the floor.  3. Tighten your lower belly (abdomen) muscles to press your lower back against the floor. This will make your tailbone point up to the ceiling instead of pointing down to your feet or the floor.  4. Stay in this position for 5-10 seconds while you gently tighten your muscles and breathe evenly.  Cat-cow  Do these steps until your lower back bends more easily:  1. Get on your hands and knees on a firm surface. Keep your hands under your shoulders, and keep your knees under your hips. You may put padding under your knees.  2. Let your head hang down toward your chest. Tighten (contract) the muscles in your belly. Point your tailbone toward the floor so your lower back becomes rounded like the back of a cat.  3. Stay in this position for 5 seconds.  4. Slowly lift your  head. Let the muscles of your belly relax. Point your tailbone up toward the ceiling so your back forms a sagging arch like the back of a cow.  5. Stay in this position for 5 seconds.    Press-ups  Do these steps 5-10 times in a row:  1. Lie on your belly (face-down) on the floor.  2. Place your hands near your head, about shoulder-width apart.  3. While you keep your back relaxed and keep your hips on the floor, slowly straighten your arms to raise the top half of your body and lift your shoulders. Do not use your back muscles. You may change where you place your hands in order to make yourself more comfortable.  4. Stay in this position for 5 seconds.  5. Slowly return to lying flat on the floor.    Bridges  Do these steps 10 times in a row:  1. Lie on your back on a firm surface.  2. Bend your knees so they point up to the ceiling. Your feet should be flat on the floor. Your arms should be flat at your sides, next to your body.  3. Tighten your butt muscles and lift your butt off the floor until your waist is almost as high as your knees. If you do not feel the muscles working in your butt and the back of your thighs, slide your feet 1-2 inches farther away from your butt.  4. Stay in this position for 3-5 seconds.  5. Slowly lower your butt to the floor, and let your butt muscles relax.  If this exercise is too easy, try doing it with your arms crossed over your chest.  Belly crunches  Do these steps 5-10 times in a row:  1. Lie on your back on a firm bed or the floor with your legs stretched out.  2. Bend your knees so they point up to the ceiling. Your feet should be flat on the floor.  3. Cross your arms over your chest.  4. Tip your chin a little bit toward your chest but do not bend your neck.  5. Tighten your belly muscles and slowly raise your chest just enough to lift your shoulder blades a tiny bit off of the floor. Avoid raising your body higher than that, because it can put too much stress on your low  back.  6. Slowly lower your chest and your head to the floor.  Back lifts  Do these steps 5-10 times in a row:  1. Lie on your belly (face-down) with your arms at your sides, and rest your forehead on the floor.  2. Tighten the muscles in your legs and your butt.  3. Slowly lift your chest off of the floor while you keep your hips on the floor. Keep the back of your head in line with the curve in your back. Look at the floor while you do this.  4. Stay in this position for 3-5 seconds.  5. Slowly lower your chest and your face to the floor.  Contact a doctor if:  · Your back pain gets a lot worse when you do an exercise.  · Your back pain does not get better 2 hours after you exercise.  If you have any of these problems, stop doing the exercises. Do not do them again unless your doctor says it is okay.  Get help right away if:  · You have sudden, very bad back pain. If this happens, stop doing the exercises. Do not do them again unless your doctor says it is okay.  This information is not intended to replace advice given to you by your health care provider. Make sure you discuss any questions you have with your health care provider.  Document Released: 01/20/2012 Document Revised: 09/12/2019 Document Reviewed: 09/12/2019  Elsevier Interactive Patient Education © 2019 [employer]

## 2020-02-17 NOTE — PROGRESS NOTES
"NAME: Jennie Lofton  : 1955   MRN: [id number]   DATE: 2020    CC: 4 months Follow up status post total joint replacement    SUBJECTIVE:    HPI: Patient returns today for a follow up of total joint replacement. Patient reports doing well with no unusual complaints. Appears to be progressing appropriately. She is having issues with her right si joint and lower back that have been affecting her.   HPI    This problem is not new to this examiner.     Allergies:   Allergies   Allergen Reactions   • Metformin And Related GI Intolerance     Admitted x2 with lactic acidosis   • Codeine Anxiety, Dizziness and Nausea Only   • Letrozole Rash   • Naproxen Anxiety and Dizziness   • Other Rash     METAL ALLERGY  \"PT STATES MIGHT BE NICKEL JUST NOT SURE\".       Medications:   Home Medications:  Current Outpatient Medications on File Prior to Visit   Medication Sig   • ALPRAZolam (XANAX) 0.25 MG tablet Take 1 tablet by mouth 2 (Two) Times a Day As Needed for Anxiety.   • Cholecalciferol (VITAMIN D3) 125 MCG (5000 UT) capsule capsule TAKE ONE CAPSULE BY MOUTH DAILY   • glipizide (GLUCOTROL XL) 5 MG ER tablet TAKE ONE TABLET BY MOUTH DAILY   • glucose blood (ACCU-CHEK JASSI PLUS) test strip Use as instructed test once daily dx e11.9   • JANUVIA 100 MG tablet TAKE ONE TABLET BY MOUTH DAILY   • Lancets (ACCU-CHEK SOFT TOUCH) lancets Test once daily   • levothyroxine (SYNTHROID, LEVOTHROID) 175 MCG tablet Take 1 tablet by mouth Daily.   • lisinopril (PRINIVIL,ZESTRIL) 20 MG tablet TAKE ONE TABLET BY MOUTH DAILY AS DIRECTED     No current facility-administered medications on file prior to visit.        Current Medications:  Scheduled Meds:  Continuous Infusions:  No current facility-administered medications for this visit.   PRN Meds:.    I have reviewed the patient's medical history in detail and updated the computerized patient record.  Review and summarization of old records include:    Past Medical History: " "  Diagnosis Date   • Anxiety    • Arthritis    • Breast cancer (CMS/HCC) 2012    HAD BREAST LUMPECTOMY   • Diabetes mellitus (CMS/HCC)    • Hypertension    • Hypothyroid    • Muscle spasm of right leg    • Neuropathy    • Right hip pain    • Urinary tract infection         Past Surgical History:   Procedure Laterality Date   • BREAST LUMPECTOMY Right     right    • TOTAL HIP ARTHROPLASTY Right 10/16/2019    Procedure: RIGHT TOTAL HIP ARTHROPLASTY GUY NAVIGATION;  Surgeon: Paul Hercules MD;  Location: [facility] OR;  Service: Orthopedics        Social History     Occupational History   • Not on file   Tobacco Use   • Smoking status: Former Smoker     Packs/day: 1.00     Years: 4.00     Pack years: 4.00     Start date: 1973     Last attempt to quit: 1977     Years since quittin.4   • Smokeless tobacco: Never Used   • Tobacco comment: college years [id number]   Substance and Sexual Activity   • Alcohol use: Yes     Alcohol/week: 2.0 standard drinks     Types: 1 Glasses of wine, 1 Cans of beer per week     Comment: social/ wine/beer occasionally   • Drug use: No   • Sexual activity: Not Currently     Partners: Male     Birth control/protection: None    Social History     Social History Narrative   • Not on file        Family History   Problem Relation Age of Onset   • Diabetes Mother    • Diabetes Sister    • Skin cancer Father    • Cancer Father         skin   • Malig Hyperthermia Neg Hx        ROS: 14 point review of systems was performed and was negative except for documented findings in HPI and today's encounter.     Allergies:   Allergies   Allergen Reactions   • Metformin And Related GI Intolerance     Admitted x2 with lactic acidosis   • Codeine Anxiety, Dizziness and Nausea Only   • Letrozole Rash   • Naproxen Anxiety and Dizziness   • Other Rash     METAL ALLERGY  \"PT STATES MIGHT BE NICKEL JUST NOT SURE\".     Constitutional:  Denies fever, shaking or chills   Eyes:  Denies change in " "visual acuity   HENT:  Denies nasal congestion or sore throat   Respiratory:  Denies cough or shortness of breath   Cardiovascular:  Denies chest pain or severe LE edema   GI:  Denies abdominal pain, nausea, vomiting, bloody stools or diarrhea   Musculoskeletal:  Denies numbness tingling or loss of motor function except as outlined above in history of present illness.  : Denies painful urination or hematuria  Integument:  Denies rash, lesion or ulceration   Neurologic:  Denies headache or focal weakness  Endocrine:  Denies lymphadenopathy  Psych:  Denies confusion or change in mental status   Hem:  Denies active bleeding        OBJECTIVE:     Physical Exam:  Vital Signs:    Wt Readings from Last 3 Encounters:   02/17/20 90.3 kg (199 lb)   01/16/20 91.6 kg (202 lb)   11/26/19 89.4 kg (197 lb)     Ht Readings from Last 3 Encounters:   02/17/20 167.6 cm (66\")   01/16/20 167.6 cm (66\")   11/26/19 167.6 cm (66\")     Body mass index is 32.12 kg/m².  Facility age limit for growth percentiles is 20 years.  Vitals:    02/17/20 1029   Temp: 98.3 °F (36.8 °C)       Constitutional: Awake alert and oriented x3, well developed, well nourished, no acute distress, non-toxic appearance.  HEENT:  Normocephalic, Atraumatic, Bilateral external ears normal, Oropharynx moist, No oral exudates, Nose normal.   Respiratory:  No respiratory distress, No wheezing  CV: No palpitations  Vascular:  Brisk cap refill, Intact distal pulses, No cyanosis, all compartments soft with no signs or symptoms of compartment syndrome or DVT.  Neurologic: Sensation grossly intact to light touch throughout the involved extremity and bilaterally symmetric, deep tendon reflexes are 2+ and bilaterally symmetric, No focal deficits noted.   Neck:  Normal range of motion, No tenderness, Supple, Negative Spurlings.  Integument: Well hydrated, no rash, warm, dry, no lesions or ulceration.   Musculoskeletal:  Affected extremity post op incision is healed " appropriately. No sign of infection. Range of motion is progressing as expected. The calf is soft and nontender with a negative Homans sign. Distal pulses intact. Normal amount of swelling present for recovery time.  si joint tenderness     DIAGNOSTIC STUDIES  Imaging done today, images were personally viewed and discussed with the patient:  Indication, findings and comparison: 2V AP & Lat of the operative joint viewed for evaluation of past joint replacement and discussed with patient. They demonstrate a well positioned, well aligned total joint replacement without complicating factors noted. In comparison with the film from the last office visit, there has been no alignment change.    ASSESSMENT: Status post right total hip replacement with expected healing, si joint arthritis and sciatica on the right side.     PLAN: 1) Continue home PT exercises as needed.  Discussed core strengthening exercises and will print some back exercises for her and demonstrated as she cannot afford more PT at this point.    2) Continue with taking a dose of antibiotics before dental procedures for 2 yrs post total joint replacement.   3) Follow up as ordered.    2/17/2020  Patient was seen by OLEG Hawkins in the office today.

## 2020-03-02 DIAGNOSIS — I10 ESSENTIAL (PRIMARY) HYPERTENSION: ICD-10-CM

## 2020-03-02 DIAGNOSIS — E11.9 TYPE 2 DIABETES MELLITUS WITHOUT COMPLICATION, WITHOUT LONG-TERM CURRENT USE OF INSULIN (HCC): ICD-10-CM

## 2020-03-03 RX ORDER — LISINOPRIL 20 MG/1
TABLET ORAL
Qty: 90 TABLET | Refills: 0 | Status: SHIPPED | OUTPATIENT
Start: 2020-03-03 | End: 2020-06-02

## 2020-03-17 RX ORDER — LEVOTHYROXINE SODIUM 175 UG/1
TABLET ORAL
Qty: 30 TABLET | Refills: 1 | Status: SHIPPED | OUTPATIENT
Start: 2020-03-17 | End: 2020-05-18

## 2020-03-22 ENCOUNTER — RESULTS ENCOUNTER (OUTPATIENT)
Dept: FAMILY MEDICINE CLINIC | Facility: CLINIC | Age: 65
End: 2020-03-22

## 2020-03-22 DIAGNOSIS — E03.8 OTHER SPECIFIED HYPOTHYROIDISM: ICD-10-CM

## 2020-04-16 RX ORDER — SITAGLIPTIN 100 MG/1
TABLET, FILM COATED ORAL
Qty: 90 TABLET | Refills: 0 | Status: SHIPPED | OUTPATIENT
Start: 2020-04-16 | End: 2020-06-30

## 2020-05-06 DIAGNOSIS — E11.9 TYPE 2 DIABETES MELLITUS WITHOUT COMPLICATION, WITHOUT LONG-TERM CURRENT USE OF INSULIN (HCC): ICD-10-CM

## 2020-05-07 RX ORDER — GLIPIZIDE 5 MG/1
TABLET, FILM COATED, EXTENDED RELEASE ORAL
Qty: 90 TABLET | Refills: 1 | Status: SHIPPED | OUTPATIENT
Start: 2020-05-07 | End: 2020-11-17

## 2020-05-18 RX ORDER — LEVOTHYROXINE SODIUM 175 UG/1
TABLET ORAL
Qty: 30 TABLET | Refills: 1 | Status: SHIPPED | OUTPATIENT
Start: 2020-05-18 | End: 2020-06-25 | Stop reason: SDUPTHER

## 2020-06-01 DIAGNOSIS — E11.9 TYPE 2 DIABETES MELLITUS WITHOUT COMPLICATION, WITHOUT LONG-TERM CURRENT USE OF INSULIN (HCC): ICD-10-CM

## 2020-06-01 DIAGNOSIS — I10 ESSENTIAL (PRIMARY) HYPERTENSION: ICD-10-CM

## 2020-06-02 RX ORDER — LISINOPRIL 20 MG/1
20 TABLET ORAL DAILY
Qty: 90 TABLET | Refills: 0 | Status: SHIPPED | OUTPATIENT
Start: 2020-06-02 | End: 2020-09-01

## 2020-06-10 ENCOUNTER — OFFICE VISIT (OUTPATIENT)
Dept: ORTHOPEDIC SURGERY | Facility: CLINIC | Age: 65
End: 2020-06-10

## 2020-06-10 VITALS — TEMPERATURE: 98.6 F | WEIGHT: 198 LBS | BODY MASS INDEX: 31.82 KG/M2 | HEIGHT: 66 IN

## 2020-06-10 DIAGNOSIS — Z96.641 STATUS POST RIGHT HIP REPLACEMENT: Primary | ICD-10-CM

## 2020-06-10 DIAGNOSIS — S29.019A THORACIC MYOFASCIAL STRAIN, INITIAL ENCOUNTER: ICD-10-CM

## 2020-06-10 PROCEDURE — 73502 X-RAY EXAM HIP UNI 2-3 VIEWS: CPT | Performed by: NURSE PRACTITIONER

## 2020-06-10 PROCEDURE — 99213 OFFICE O/P EST LOW 20 MIN: CPT | Performed by: NURSE PRACTITIONER

## 2020-06-10 RX ORDER — AMOXICILLIN 500 MG/1
500 TABLET, FILM COATED ORAL
Qty: 12 TABLET | Refills: 0 | Status: SHIPPED | OUTPATIENT
Start: 2020-06-10 | End: 2020-06-30

## 2020-06-10 NOTE — PATIENT INSTRUCTIONS
Total Hip Replacement    Total hip replacement is a surgery to replace your damaged hip joint. Your hip joint is replaced with a man-made (artificial) hip joint. This man-made hip joint is called a prosthesis. This surgery is done to lessen pain and to help your hip move better.  What happens before the procedure?  Staying hydrated  Follow instructions from your doctor about drinking fluids. This may include:  · Up to 2 hours before surgery - you may keep drinking clear liquids. These include:  ? Water.  ? Clear fruit juice.  ? Black coffee.  ? Plain tea.  Eating and drinking restrictions  Follow instructions from your doctor about eating and drinking. These may include:  · 8 hours before surgery - stop eating heavy meals or foods. These include meat, fried foods, and fatty foods.  · 6 hours before surgery - stop eating light meals or foods. These include toast and cereal.  · 6 hours before surgery - stop drinking milk or drinks that have milk in them.  · 2 hours before surgery - stop drinking clear liquids.  Medicines  Ask your doctor about:  · Changing or stopping your normal medicines. This is important if you take diabetes medicines or blood thinners.  · Taking medicines such as aspirin and ibuprofen. These can thin your blood. Do not take these medicines unless your doctor tells you to take them.  · Taking over-the-counter medicines, vitamins, herbs, and supplements.  General instructions  · You may have a physical exam.  · You may have tests, such as:  ? X-rays or MRI.  ? Blood or urine tests.  · Plan to have someone take you home.  · Plan to have someone you trust take care of you for at least 24 hours after you leave the hospital or clinic. This is important.  · Prepare your home so you can be safe and have easy access to what you need.  · Have teeth cleanings or any dental work done weeks before your surgery, or wait until a few weeks after your surgery.  · Avoid shaving your legs just before surgery. If  any shaving is needed, it will be done in the hospital.  · Ask your doctor how your surgical site will be marked or identified.  What happens during the procedure?  · To lower your risk of infection:  ? Your health care team will wash or sanitize their hands.  ? Hair may be removed from the surgical area.  ? Your skin will be washed with soap.  · An IV tube will be put into one of your veins.  · You will be given one or more of the following:  ? A medicine to help you relax (sedative).  ? A medicine to make you fall asleep (general anesthetic).  ? A medicine to numb your body below the waist (spinal anesthetic).  · Your doctor will make a cut (incision) in your hip. The place where the cut is made will depend on the approach used by the doctor:  ? Posterior approach. The cut will be at the back of the hip.  ? Anterior approach. The cut will be at the front of the hip.  · Then, your doctor will:  ? Use his or her hands to move your hip out of position (dislocate it).  ? Cut and take out damaged parts of your hip joint.  ? Put a man-made hip joint into place.  ? Do an X-ray of the hip joint to make sure it is in the right place.  ? Place a drain to remove extra fluid, if needed.  ? Close the cut and place a bandage (dressing) over it.  The procedure may vary among doctors and hospitals.  What happens after the procedure?  · Your health care team will:  ? Monitor you until you leave the hospital.  ? Check your blood pressure, heart rate, breathing rate, and blood oxygen level.  ? Check if you can move your foot and can feel sensations in it.  ? Give you pain medicine.  · Your doctor will tell you to take actions to help prevent blood clots and reduce swelling in your legs. You may need to:  ? Wear a type of socks that are tight (compression stockings).  ? Take medicines to thin your blood (anticoagulants).  · You will do exercises (physical therapy) until you are doing well. Your doctor will tell you when you are well  enough to go home.  · You may need to use a walker or crutches.  · You may need to use a wedge pillow (hip abduction pillow) when you are in bed. This pillow will keep your legs from turning in ways that may cause your new hip joint to move out of place.  Summary  · Total hip replacement is a surgery to replace your damaged hip joint. Your hip joint is replaced with a man-made (artificial) hip joint.  · Follow instructions from your doctor about eating and drinking before the procedure.  · Plan to have someone take you home from the hospital.  · You may need to use a walker or crutches after surgery.  This information is not intended to replace advice given to you by your health care provider. Make sure you discuss any questions you have with your health care provider.  Document Released: 03/11/2013 Document Revised: 01/10/2020 Document Reviewed: 01/29/2019  [employer] Interactive Patient Education © 2020 [employer]  Thoracic Strain  A thoracic strain, which is sometimes called a mid-back strain, is an injury to the muscles or tendons that attach to the upper part of your back behind your chest. This type of injury occurs when a muscle is overstretched or overloaded.  Thoracic strains can range from mild to severe. Mild strains may involve stretching a muscle or tendon without tearing it. These injuries may heal in 1-2 weeks. More severe strains involve tearing of muscle fibers or tendons. These will cause more pain and may take 6-8 weeks to heal.  What are the causes?  This condition may be caused by:  · Trauma, such as a fall or a hit to the body.  · Twisting or overstretching the back. This may result from doing activities that require a lot of energy, such as lifting heavy objects.  In some cases, the cause may not be known.  What increases the risk?  This injury is more common in:  · Athletes.  · People with obesity.  What are the signs or symptoms?  The main symptom of this condition is pain in the middle  back, especially with movement. Other symptoms include:  · Stiffness or limited range of motion.  · Sudden muscle tightening (spasms).  How is this diagnosed?  This condition may be diagnosed based on:  · Your symptoms.  · Your medical history.  · A physical exam.  · Imaging tests, such as X-rays or an MRI.  How is this treated?  This condition may be treated with:  · Resting the injured area.  · Applying heat and cold to the injured area.  · Over-the-counter medicines for pain and inflammation, such as NSAIDs.  · Prescription pain medicine or muscle relaxants may be needed for a short time.  · Physical therapy. This will involve doing stretching and strengthening exercises.  Follow these instructions at home:  Managing pain, stiffness, and swelling         · If directed, put ice on the injured area.  ? Put ice in a plastic bag.  ? Place a towel between your skin and the bag.  ? Leave the ice on for 20 minutes, 2-3 times a day.  · If directed, apply heat to the affected area as often as told by your health care provider. Use the heat source that your health care provider recommends, such as a moist heat pack or a heating pad.  ? Place a towel between your skin and the heat source.  ? Leave the heat on for 20-30 minutes.  ? Remove the heat if your skin turns bright red. This is especially important if you are unable to feel pain, heat, or cold. You may have a greater risk of getting burned.  Activity  · Rest and return to your normal activities as told by your health care provider. Ask your health care provider what activities are safe for you.  · Do exercises as told by your health care provider.  Medicines  · Take over-the-counter and prescription medicines only as told by your health care provider.  · Ask your health care provider if the medicine prescribed to you:  ? Requires you to avoid driving or using heavy machinery.  ? Can cause constipation. You may need to take these actions to prevent or treat  constipation:  § Drink enough fluid to keep your urine pale yellow.  § Take over-the-counter or prescription medicines.  § Eat foods that are high in fiber, such as beans, whole grains, and fresh fruits and vegetables.  § Limit foods that are high in fat and processed sugars, such as fried or sweet foods.  Injury prevention  To prevent a future mid-back injury:  · Always warm up properly before physical activity or sports.  · Cool down and stretch after being active.  · Use correct form when playing sports and lifting heavy objects. Bend your knees before you lift heavy objects.  · Use good posture when sitting and standing.  · Stay physically fit and maintain a healthy weight.  ? Do at least 150 minutes of moderate-intensity exercise each week, such as brisk walking or water aerobics.  ? Do strength exercises at least 2 times each week.    General instructions  · Do not use any products that contain nicotine or tobacco, such as cigarettes, e-cigarettes, and chewing tobacco. If you need help quitting, ask your health care provider.  · Keep all follow-up visits as told by your health care provider. This is important.  Contact a health care provider if:  · Your pain is not helped by medicine.  · Your pain or stiffness is getting worse.  · You develop pain or stiffness in your neck or lower back.  Get help right away if you:  · Have shortness of breath.  · Have chest pain.  · Develop numbness or weakness in your legs or arms.  · Have involuntary loss of urine (urinary incontinence).  Summary  · A thoracic strain, which is sometimes called a mid-back strain, is an injury to the muscles or tendons that attach to the upper part of your back behind your chest.  · This type of injury occurs when a muscle is overstretched or overloaded.  · Rest and return to your normal activities as told by your health care provider. If directed, apply heat or ice to the affected area as often as told by your health care provider.  · Take  over-the-counter and prescription medicines only as told by your health care provider.  · Contact a health care provider if you have new or worsening symptoms.  This information is not intended to replace advice given to you by your health care provider. Make sure you discuss any questions you have with your health care provider.  Document Released: 03/09/2005 Document Revised: 11/05/2019 Document Reviewed: 11/05/2019  [employer] Patient Education © 2020 [employer]

## 2020-06-10 NOTE — PROGRESS NOTES
Patient Name: Jennie Lofton   YOB: 1955  Referring Primary Care Physician: Frank Saenz DO  BMI: Body mass index is 31.96 kg/m².    Chief Complaint:    Chief Complaint   Patient presents with   • Right Hip - Follow-up        HPI: New pt to me here for follow up JAIME SPM - no complaints to hip. Has been gardening and has pain in upper back - no fever, no rash. No cough or SOA.  Pt is 8 months S/P JAIME SPM  Having dental extraction and may have multiple procedures needs antibiotic prophylasix. NKDANNIELLE Lofton is a 65 y.o. female who presents today for evaluation of   Chief Complaint   Patient presents with   • Right Hip - Follow-up       This problem is new to this examiner.     Subjective   Medications:   Home Medications:  Current Outpatient Medications on File Prior to Visit   Medication Sig   • ALPRAZolam (XANAX) 0.25 MG tablet Take 1 tablet by mouth 2 (Two) Times a Day As Needed for Anxiety.   • Cholecalciferol (VITAMIN D3) 125 MCG (5000 UT) capsule capsule TAKE ONE CAPSULE BY MOUTH DAILY   • glipizide (GLUCOTROL XL) 5 MG ER tablet TAKE ONE TABLET BY MOUTH DAILY   • glucose blood (ACCU-CHEK JASSI PLUS) test strip Use as instructed test once daily dx e11.9   • JANUVIA 100 MG tablet TAKE ONE TABLET BY MOUTH DAILY   • Lancets (ACCU-CHEK SOFT TOUCH) lancets Test once daily   • levothyroxine (SYNTHROID, LEVOTHROID) 175 MCG tablet TAKE ONE TABLET BY MOUTH DAILY   • lisinopril (PRINIVIL,ZESTRIL) 20 MG tablet Take 1 tablet by mouth Daily. PLEASE CALL THE OFFICE FOR AN APPT     No current facility-administered medications on file prior to visit.      Current Medications:  Scheduled Meds:  Continuous Infusions:  No current facility-administered medications for this visit.   PRN Meds:.    I have reviewed the patient's medical history in detail and updated the computerized patient record.  Review and summarization of old records includes:    Past Medical History:   Diagnosis Date   • Anxiety   "  • Arthritis    • Breast cancer (CMS/HCC) 2012    HAD BREAST LUMPECTOMY   • Diabetes mellitus (CMS/HCC)    • Hypertension    • Hypothyroid    • Muscle spasm of right leg    • Neuropathy    • Right hip pain    • Urinary tract infection         Past Surgical History:   Procedure Laterality Date   • BREAST LUMPECTOMY Right     right    • TOTAL HIP ARTHROPLASTY Right 10/16/2019    Procedure: RIGHT TOTAL HIP ARTHROPLASTY GUY NAVIGATION;  Surgeon: Paul Hercules MD;  Location: [facility];  Service: Orthopedics        Social History     Occupational History   • Not on file   Tobacco Use   • Smoking status: Former Smoker     Packs/day: 1.00     Years: 4.00     Pack years: 4.00     Start date: 1973     Last attempt to quit: 1977     Years since quittin.8   • Smokeless tobacco: Never Used   • Tobacco comment: college years [id number]   Substance and Sexual Activity   • Alcohol use: Yes     Alcohol/week: 2.0 standard drinks     Types: 1 Glasses of wine, 1 Cans of beer per week     Comment: social/ wine/beer occasionally   • Drug use: No   • Sexual activity: Not Currently     Partners: Male     Birth control/protection: None      Social History     Social History Narrative   • Not on file        Family History   Problem Relation Age of Onset   • Diabetes Mother    • Diabetes Sister    • Skin cancer Father    • Cancer Father         skin   • Malig Hyperthermia Neg Hx        ROS: 14 point review of systems was performed and all other systems were reviewed and are negative except for documented findings in HPI and today's encounter.     Allergies:   Allergies   Allergen Reactions   • Metformin And Related GI Intolerance     Admitted x2 with lactic acidosis   • Codeine Anxiety, Dizziness and Nausea Only   • Letrozole Rash   • Naproxen Anxiety and Dizziness   • Other Rash     METAL ALLERGY  \"PT STATES MIGHT BE NICKEL JUST NOT SURE\".     Constitutional:  Denies fever, shaking or chills   Eyes:  Denies " "change in visual acuity   HENT:  Denies nasal congestion or sore throat   Respiratory:  Denies cough or shortness of breath   Cardiovascular:  Denies chest pain or severe LE edema   GI:  Denies abdominal pain, nausea, vomiting, bloody stools or diarrhea   Musculoskeletal:  Numbness, tingling, pain, or loss of motor function only as noted above in history of present illness.  : Denies painful urination or hematuria  Integument:  Denies rash, lesion or ulceration   Neurologic:  Denies headache or focal weakness  Endocrine:  Denies lymphadenopathy  Psych:  Denies confusion or change in mental status   Hem:  Denies active bleeding    OBJECTIVE:  Physical Exam:   Temp 98.6 °F (37 °C) (Temporal)   Ht 167.6 cm (66\")   Wt 89.8 kg (198 lb)   BMI 31.96 kg/m²     General Appearance:    Alert, cooperative, in no acute distress                  Eyes: conjunctiva clear  ENT: external ears and nose atraumatic  CV: no peripheral edema  Resp: normal respiratory effort  Skin: no rashes or wounds; normal turgor  Psych: mood and affect appropriate  Lymph: no nodes appreciated  Neuro: gross sensation intact  Vascular:  Palpable peripheral pulse in noted extremity  Musculoskeletal Extremities:ambulates without difficulty, mid thoracic tenderness - skin clear - spine nttp  Skin is clear - point tender to upper thoracic spine paraspinous process  Radiology:   Well fixed right total hip 2 views done post op in good alignment compared with previous    Assessment:     ICD-10-CM ICD-9-CM   1. Status post right hip replacement Z96.641 V43.64   2. Thoracic myofascial strain, initial encounter S29.019A 847.1        Procedures   gave antibiotics for dental procedure    Plan: Biomechanics of pertinent body area discussed.  Risks, benefits, alternatives, comparisons, and complications of accepted medicines, injections, recommendations, surgical procedures, and therapies explained and education provided in laymen's terms. Natural history and " expected course of this patient's diagnosis discussed along with evaluation of therapies. Questions answered. When appropriate I also discussed proper use of cane, walker, trekking poles.   EXERCISES:  Advice on benefits of, and types of regular/moderate exercise including biomechanical forces involved as it pertains to this complaint.      6/10/2020    Much of this encounter note is an electronic transcription/translation of spoken language to printed text. The electronic translation of spoken language may permit erroneous, or at times, nonsensical words or phrases to be inadvertently transcribed; Although I have reviewed the note for such errors, some may still exist

## 2020-06-17 RX ORDER — LANCETS
EACH MISCELLANEOUS
Qty: 100 EACH | Refills: 11 | Status: SHIPPED | OUTPATIENT
Start: 2020-06-17 | End: 2022-07-28 | Stop reason: SDUPTHER

## 2020-06-24 ENCOUNTER — TELEPHONE (OUTPATIENT)
Dept: FAMILY MEDICINE CLINIC | Facility: CLINIC | Age: 65
End: 2020-06-24

## 2020-06-24 DIAGNOSIS — R39.9 UTI SYMPTOMS: Primary | ICD-10-CM

## 2020-06-24 RX ORDER — CEPHALEXIN 500 MG/1
500 CAPSULE ORAL 2 TIMES DAILY
Qty: 14 CAPSULE | Refills: 0 | Status: SHIPPED | OUTPATIENT
Start: 2020-06-24 | End: 2020-10-14

## 2020-06-24 NOTE — TELEPHONE ENCOUNTER
Pt has UTI symptoms and wants to know if you will send in antibiotic. Pt dropped off urine sample for urine culture.

## 2020-06-25 LAB — TSH SERPL DL<=0.005 MIU/L-ACNC: 0.02 UIU/ML (ref 0.27–4.2)

## 2020-06-25 RX ORDER — LEVOTHYROXINE SODIUM 0.15 MG/1
150 TABLET ORAL DAILY
Qty: 30 TABLET | Refills: 1 | Status: SHIPPED | OUTPATIENT
Start: 2020-06-25 | End: 2020-09-11

## 2020-06-26 LAB
BACTERIA UR CULT: NORMAL
BACTERIA UR CULT: NORMAL

## 2020-06-28 NOTE — PROGRESS NOTES
Call results to patient.  Urine culture a little normal yoandy, if feeling better I would finish antibiotics.

## 2020-06-30 ENCOUNTER — OFFICE VISIT (OUTPATIENT)
Dept: FAMILY MEDICINE CLINIC | Facility: CLINIC | Age: 65
End: 2020-06-30

## 2020-06-30 VITALS
HEART RATE: 91 BPM | DIASTOLIC BLOOD PRESSURE: 70 MMHG | HEIGHT: 66 IN | SYSTOLIC BLOOD PRESSURE: 122 MMHG | OXYGEN SATURATION: 97 % | WEIGHT: 198 LBS | BODY MASS INDEX: 31.82 KG/M2 | TEMPERATURE: 98.4 F

## 2020-06-30 DIAGNOSIS — E11.9 CONTROLLED TYPE 2 DIABETES MELLITUS WITHOUT COMPLICATION, WITHOUT LONG-TERM CURRENT USE OF INSULIN (HCC): Primary | ICD-10-CM

## 2020-06-30 DIAGNOSIS — E03.8 OTHER SPECIFIED HYPOTHYROIDISM: ICD-10-CM

## 2020-06-30 DIAGNOSIS — I10 ESSENTIAL (PRIMARY) HYPERTENSION: ICD-10-CM

## 2020-06-30 PROCEDURE — 99214 OFFICE O/P EST MOD 30 MIN: CPT | Performed by: FAMILY MEDICINE

## 2020-06-30 RX ORDER — SITAGLIPTIN 100 MG/1
TABLET, FILM COATED ORAL
Qty: 90 TABLET | Refills: 1 | Status: SHIPPED | OUTPATIENT
Start: 2020-06-30 | End: 2020-10-14 | Stop reason: SDUPTHER

## 2020-06-30 NOTE — PROGRESS NOTES
Subjective   Jennie Lofton is a 65 y.o. female. Presents today for   Chief Complaint   Patient presents with   • Diabetes   • Med Refill   • Hypothyroidism   • Hypertension     Answers for HPI/ROS submitted by the patient on 6/23/2020   Diabetes problem  What is the primary reason for your visit?: Diabetes    Diabetes   She has type 2 diabetes mellitus. No MedicAlert identification noted. Pertinent negatives for hypoglycemia include no confusion, dizziness, headaches, hunger, mood changes, nervousness/anxiousness, pallor, seizures, sleepiness, speech difficulty, sweats or tremors. Associated symptoms include foot paresthesias and polyuria. Pertinent negatives for diabetes include no blurred vision, no chest pain, no fatigue, no foot ulcerations, no polydipsia, no polyphagia, no visual change, no weakness and no weight loss. Pertinent negatives for hypoglycemia complications include no blackouts, no hospitalization, no nocturnal hypoglycemia, no required assistance and no required glucagon injection. Symptoms are stable. Diabetic complications include peripheral neuropathy. Pertinent negatives for diabetic complications include no CVA, heart disease, impotence, nephropathy, PVD or retinopathy. Risk factors for coronary artery disease include obesity. Current diabetic treatment includes oral agent (dual therapy). She is compliant with treatment all of the time. Her weight is stable. She is following a generally healthy diet. Meal planning includes avoidance of concentrated sweets. She has not had a previous visit with a dietitian. She participates in exercise weekly. She monitors blood glucose at home 1-2 x per day. Blood glucose monitoring compliance is excellent. Her home blood glucose trend is fluctuating minimally. Her breakfast blood glucose is taken between 7-8 am. Her breakfast blood glucose range is generally 130-140 mg/dl. Her dinner blood glucose is taken between 5-6 pm. Her dinner blood glucose range is  generally 130-140 mg/dl. Her highest blood glucose is 140-180 mg/dl. Her overall blood glucose range is 130-140 mg/dl. She does not see a podiatrist.Eye exam is current.   Hypothyroidism   This is a chronic problem. The current episode started more than 1 year ago. The problem occurs constantly. Pertinent negatives include no chest pain, fatigue, headaches, visual change or weakness. Treatments tried: on levothyroxine.   Hypertension   This is a chronic problem. The current episode started more than 1 year ago. The problem is unchanged. The problem is controlled. Pertinent negatives include no blurred vision, chest pain, headaches, orthopnea, palpitations, peripheral edema, PND, shortness of breath or sweats. The current treatment provides moderate improvement. There is no history of CVA, PVD or retinopathy.     Intolerant of ozempic in past;  Changing to medicare soon.      Review of Systems   Constitutional: Negative for fatigue and weight loss.   Eyes: Negative for blurred vision.   Respiratory: Negative for shortness of breath.    Cardiovascular: Negative for chest pain, palpitations, orthopnea and PND.   Endocrine: Positive for polyuria. Negative for polydipsia and polyphagia.   Genitourinary: Negative for impotence.   Skin: Negative for pallor.   Neurological: Negative for dizziness, tremors, seizures, speech difficulty, weakness and headaches.   Psychiatric/Behavioral: Negative for confusion. The patient is not nervous/anxious.        Patient Active Problem List   Diagnosis   • Intractable vomiting with nausea   • Lactic acidosis   • Diabetes mellitus due to underlying condition with diabetic autonomic neuropathy, without long-term current use of insulin (CMS/[facility])   • Hyponatremia   • Hypothyroid   • Essential (primary) hypertension   • Anxiety   • Breast mass   • Breast pain   • Luetscher's syndrome   • DM II (diabetes mellitus, type II), controlled (CMS/[facility])   • Encounter for screening colonoscopy   •  "History of breast cancer   • Syncope   • UTI (urinary tract infection)   • Arthritis of right hip   • Status post right hip replacement   • Right sided sciatica   • Chronic SI joint pain       Social History     Socioeconomic History   • Marital status:      Spouse name: Not on file   • Number of children: Not on file   • Years of education: Not on file   • Highest education level: Not on file   Tobacco Use   • Smoking status: Former Smoker     Packs/day: 1.00     Years: 4.00     Pack years: 4.00     Start date: 1973     Last attempt to quit: 1977     Years since quittin.8   • Smokeless tobacco: Never Used   • Tobacco comment: college years [id number]   Substance and Sexual Activity   • Alcohol use: Yes     Alcohol/week: 2.0 standard drinks     Types: 1 Glasses of wine, 1 Cans of beer per week     Comment: social/ wine/beer occasionally   • Drug use: No   • Sexual activity: Not Currently     Partners: Male     Birth control/protection: None       Allergies   Allergen Reactions   • Metformin And Related GI Intolerance     Admitted x2 with lactic acidosis   • Codeine Anxiety, Dizziness and Nausea Only   • Letrozole Rash   • Naproxen Anxiety and Dizziness   • Other Rash     METAL ALLERGY  \"PT STATES MIGHT BE NICKEL JUST NOT SURE\".       Current Outpatient Medications on File Prior to Visit   Medication Sig Dispense Refill   • Accu-Chek Softclix Lancets lancets USE ONE LANCET TO TEST DAILY 100 each 11   • ALPRAZolam (XANAX) 0.25 MG tablet Take 1 tablet by mouth 2 (Two) Times a Day As Needed for Anxiety. 60 tablet 0   • cephalexin (KEFLEX) 500 MG capsule Take 1 capsule by mouth 2 (Two) Times a Day. 14 capsule 0   • Cholecalciferol (VITAMIN D3) 125 MCG (5000 UT) capsule capsule TAKE ONE CAPSULE BY MOUTH DAILY 90 capsule 0   • glipizide (GLUCOTROL XL) 5 MG ER tablet TAKE ONE TABLET BY MOUTH DAILY 90 tablet 1   • glucose blood (Accu-Chek Maddison Plus) test strip CHECK FOR BLOOD SUGAR ONCE DAILY 100 each 11 " "  • levothyroxine (SYNTHROID, LEVOTHROID) 150 MCG tablet Take 1 tablet by mouth Daily. 30 tablet 1   • lisinopril (PRINIVIL,ZESTRIL) 20 MG tablet Take 1 tablet by mouth Daily. PLEASE CALL THE OFFICE FOR AN APPT 90 tablet 0   • [DISCONTINUED] amoxicillin (AMOXIL) 500 MG tablet Take 1 tablet by mouth 3 (Three) Times a Week if Needed (dental procedure s/p JAIME). Take 4 per mouth 1 hour prior to procedure 12 tablet 0   • [DISCONTINUED] JANUVIA 100 MG tablet TAKE ONE TABLET BY MOUTH DAILY 90 tablet 0     No current facility-administered medications on file prior to visit.        Objective   Vitals:    06/30/20 1619   BP: 122/70   BP Location: Left arm   Patient Position: Sitting   Cuff Size: Adult   Pulse: 91   Temp: 98.4 °F (36.9 °C)   TempSrc: Oral   SpO2: 97%   Weight: 89.8 kg (198 lb)   Height: 167.6 cm (66\")     Body mass index is 31.96 kg/m².    Physical Exam   Constitutional: She appears well-developed and well-nourished.   HENT:   Head: Normocephalic and atraumatic.   Neck: Neck supple. No JVD present. No thyromegaly present.   Cardiovascular: Normal rate, regular rhythm and normal heart sounds. Exam reveals no gallop and no friction rub.   No murmur heard.  Pulmonary/Chest: Effort normal and breath sounds normal. No respiratory distress. She has no wheezes. She has no rales.   Abdominal: Soft. Bowel sounds are normal. She exhibits no distension. There is no tenderness. There is no rebound and no guarding.   Musculoskeletal: She exhibits no edema.   Neurological: She is alert.   Skin: Skin is warm and dry.   Psychiatric: She has a normal mood and affect. Her behavior is normal.   Nursing note and vitals reviewed.    Component      Latest Ref Rng & Units 1/16/2020 6/24/2020   Hemoglobin A1C      4.80 - 5.60 % 7.10 (H)    TSH Baseline      0.270 - 4.200 uIU/mL 19.800 (H) 0.020 (L)     Assessment/Plan   Jennie was seen today for diabetes, med refill, hypothyroidism and hypertension.    Diagnoses and all orders for " this visit:    Controlled type 2 diabetes mellitus without complication, without long-term current use of insulin (CMS/[facility])  -     Comprehensive Metabolic Panel  -     Lipid Panel  -     Hemoglobin A1c    Essential (primary) hypertension    Other specified hypothyroidism  -     TSH  -     T4, Free  -     T3, Free    -counseled on diet and exercise;     -full labs in 6 weeks to recheck tyroid on dose adjustment;  Had recent drop in dose, doesn't think skpped at that time, but was sick.     -gave 6 weeks of onglyza 5mg as transitioning to medicare and not certain formulary, finish januvia out  -hypertension - controlled, continue medications           -Follow up: 3 months an dprn

## 2020-09-01 DIAGNOSIS — E11.9 TYPE 2 DIABETES MELLITUS WITHOUT COMPLICATION, WITHOUT LONG-TERM CURRENT USE OF INSULIN (HCC): ICD-10-CM

## 2020-09-01 DIAGNOSIS — I10 ESSENTIAL (PRIMARY) HYPERTENSION: ICD-10-CM

## 2020-09-01 RX ORDER — LISINOPRIL 20 MG/1
TABLET ORAL
Qty: 90 TABLET | Refills: 0 | Status: SHIPPED | OUTPATIENT
Start: 2020-09-01 | End: 2020-11-30

## 2020-09-11 RX ORDER — LEVOTHYROXINE SODIUM 0.15 MG/1
TABLET ORAL
Qty: 30 TABLET | Refills: 0 | Status: SHIPPED | OUTPATIENT
Start: 2020-09-11 | End: 2020-10-23 | Stop reason: SDUPTHER

## 2020-10-14 ENCOUNTER — OFFICE VISIT (OUTPATIENT)
Dept: FAMILY MEDICINE CLINIC | Facility: CLINIC | Age: 65
End: 2020-10-14

## 2020-10-14 VITALS
HEIGHT: 66 IN | HEART RATE: 65 BPM | WEIGHT: 199 LBS | BODY MASS INDEX: 31.98 KG/M2 | DIASTOLIC BLOOD PRESSURE: 60 MMHG | SYSTOLIC BLOOD PRESSURE: 112 MMHG | OXYGEN SATURATION: 100 %

## 2020-10-14 DIAGNOSIS — E08.43 DIABETES MELLITUS DUE TO UNDERLYING CONDITION WITH DIABETIC AUTONOMIC NEUROPATHY, WITHOUT LONG-TERM CURRENT USE OF INSULIN (HCC): Primary | ICD-10-CM

## 2020-10-14 DIAGNOSIS — Z23 IMMUNIZATION DUE: ICD-10-CM

## 2020-10-14 DIAGNOSIS — I10 ESSENTIAL (PRIMARY) HYPERTENSION: ICD-10-CM

## 2020-10-14 DIAGNOSIS — Z12.31 ENCOUNTER FOR SCREENING MAMMOGRAM FOR MALIGNANT NEOPLASM OF BREAST: ICD-10-CM

## 2020-10-14 DIAGNOSIS — Z23 FLU VACCINE NEED: ICD-10-CM

## 2020-10-14 DIAGNOSIS — E03.8 OTHER SPECIFIED HYPOTHYROIDISM: ICD-10-CM

## 2020-10-14 DIAGNOSIS — Z78.0 MENOPAUSE: ICD-10-CM

## 2020-10-14 DIAGNOSIS — Z85.3 HISTORY OF BREAST CANCER: ICD-10-CM

## 2020-10-14 DIAGNOSIS — L57.8 SUN-DAMAGED SKIN: ICD-10-CM

## 2020-10-14 PROCEDURE — 90694 VACC AIIV4 NO PRSRV 0.5ML IM: CPT | Performed by: FAMILY MEDICINE

## 2020-10-14 PROCEDURE — 99214 OFFICE O/P EST MOD 30 MIN: CPT | Performed by: FAMILY MEDICINE

## 2020-10-14 PROCEDURE — G0008 ADMIN INFLUENZA VIRUS VAC: HCPCS | Performed by: FAMILY MEDICINE

## 2020-10-14 NOTE — PROGRESS NOTES
Subjective   Jennie Lofton is a 65 y.o. female. Presents today for   Chief Complaint   Patient presents with   • Hypertension   • Hypothyroidism   • Diabetes       Hypertension  This is a chronic problem. The current episode started more than 1 year ago. The problem is unchanged. The problem is controlled. Pertinent negatives include no chest pain, orthopnea, palpitations, peripheral edema, PND or shortness of breath. Agents associated with hypertension include thyroid hormones. Risk factors for coronary artery disease include diabetes mellitus and post-menopausal state. Past treatments include ACE inhibitors. Current antihypertension treatment includes ACE inhibitors. The current treatment provides moderate improvement.   Hypothyroidism  This is a chronic problem. The current episode started more than 1 year ago. The problem occurs constantly. The problem has been unchanged. Pertinent negatives include no abdominal pain, chest pain, nausea or vomiting. Treatments tried: levothyroxine. The treatment provided moderate relief.   Diabetes  She presents for her follow-up diabetic visit. She has type 2 diabetes mellitus. Pertinent negatives for diabetes include no chest pain, no foot paresthesias and no foot ulcerations. Symptoms are stable. Diabetic complications include autonomic neuropathy. Risk factors for coronary artery disease include diabetes mellitus, hypertension and post-menopausal. Current diabetic treatment includes oral agent (dual therapy). She is following a diabetic diet. An ACE inhibitor/angiotensin II receptor blocker is being taken.         Review of Systems   Respiratory: Negative for shortness of breath.    Cardiovascular: Negative for chest pain, palpitations, orthopnea and PND.   Gastrointestinal: Negative for abdominal pain, nausea and vomiting.   Neurological: Negative for syncope.       Patient Active Problem List   Diagnosis   • Intractable vomiting with nausea   • Lactic acidosis   •  "Diabetes mellitus due to underlying condition with diabetic autonomic neuropathy, without long-term current use of insulin (CMS/[facility])   • Hyponatremia   • Hypothyroid   • Essential (primary) hypertension   • Anxiety   • Breast mass   • Breast pain   • Luetscher's syndrome   • DM II (diabetes mellitus, type II), controlled (CMS/[facility])   • Encounter for screening colonoscopy   • History of breast cancer   • Syncope   • UTI (urinary tract infection)   • Arthritis of right hip   • Status post right hip replacement   • Right sided sciatica   • Chronic SI joint pain       Social History     Socioeconomic History   • Marital status:      Spouse name: Not on file   • Number of children: Not on file   • Years of education: Not on file   • Highest education level: Not on file   Tobacco Use   • Smoking status: Former Smoker     Packs/day: 1.00     Years: 4.00     Pack years: 4.00     Start date: 1973     Quit date: 1977     Years since quittin.1   • Smokeless tobacco: Never Used   • Tobacco comment: college years [id number]   Substance and Sexual Activity   • Alcohol use: Yes     Alcohol/week: 2.0 standard drinks     Types: 1 Glasses of wine, 1 Cans of beer per week     Comment: social/ wine/beer occasionally   • Drug use: No   • Sexual activity: Not Currently     Partners: Male     Birth control/protection: None       Allergies   Allergen Reactions   • Metformin And Related GI Intolerance     Admitted x2 with lactic acidosis   • Codeine Anxiety, Dizziness and Nausea Only   • Letrozole Rash   • Naproxen Anxiety and Dizziness   • Other Rash     METAL ALLERGY  \"PT STATES MIGHT BE NICKEL JUST NOT SURE\".       Current Outpatient Medications on File Prior to Visit   Medication Sig Dispense Refill   • Accu-Chek Softclix Lancets lancets USE ONE LANCET TO TEST DAILY 100 each 11   • Cholecalciferol (VITAMIN D3) 125 MCG (5000 UT) capsule capsule TAKE ONE CAPSULE BY MOUTH DAILY 90 capsule 0   • glipizide (GLUCOTROL XL) 5 " "MG ER tablet TAKE ONE TABLET BY MOUTH DAILY 90 tablet 1   • glucose blood (Accu-Chek Maddison Plus) test strip 1 each by Other route Daily. USE ONE STRIP TO TEST DAILY 100 each 3   • JANUVIA 100 MG tablet TAKE ONE TABLET BY MOUTH DAILY 90 tablet 1   • levothyroxine (SYNTHROID, LEVOTHROID) 150 MCG tablet TAKE ONE TABLET BY MOUTH DAILY 30 tablet 0   • lisinopril (PRINIVIL,ZESTRIL) 20 MG tablet TAKE ONE TABLET BY MOUTH DAILY 90 tablet 0   • [DISCONTINUED] ALPRAZolam (XANAX) 0.25 MG tablet Take 1 tablet by mouth 2 (Two) Times a Day As Needed for Anxiety. 60 tablet 0   • [DISCONTINUED] cephalexin (KEFLEX) 500 MG capsule Take 1 capsule by mouth 2 (Two) Times a Day. 14 capsule 0     No current facility-administered medications on file prior to visit.        Objective   Vitals:    10/14/20 1110   BP: 112/60   BP Location: Right arm   Patient Position: Sitting   Cuff Size: Adult   Pulse: 65   SpO2: 100%   Weight: 90.3 kg (199 lb)   Height: 167.6 cm (66\")     Body mass index is 32.12 kg/m².    Physical Exam  Vitals signs and nursing note reviewed.   Constitutional:       Appearance: She is well-developed.   HENT:      Head: Normocephalic and atraumatic.   Neck:      Musculoskeletal: Neck supple.      Thyroid: No thyromegaly.      Vascular: No JVD.   Cardiovascular:      Rate and Rhythm: Normal rate and regular rhythm.      Heart sounds: Normal heart sounds. No murmur. No friction rub. No gallop.    Pulmonary:      Effort: Pulmonary effort is normal. No respiratory distress.      Breath sounds: Normal breath sounds. No wheezing or rales.   Abdominal:      General: Bowel sounds are normal. There is no distension.      Palpations: Abdomen is soft.      Tenderness: There is no abdominal tenderness. There is no guarding or rebound.   Skin:     General: Skin is warm and dry.   Neurological:      Mental Status: She is alert.   Psychiatric:         Behavior: Behavior normal.         Assessment/Plan   Diagnoses and all orders for this " visit:    1. Diabetes mellitus due to underlying condition with diabetic autonomic neuropathy, without long-term current use of insulin (CMS/HCC) (Primary)  -     Microalbumin / Creatinine Urine Ratio - Urine, Clean Catch  -     Lipid Panel  -     Comprehensive Metabolic Panel  -     Hemoglobin A1c    2. Essential (primary) hypertension  -     Comprehensive Metabolic Panel    3. Other specified hypothyroidism  -     TSH    4. History of breast cancer    5. Flu vaccine need  -     Fluad Quad 65+ yrs ([id number])    6. Encounter for screening mammogram for malignant neoplasm of breast  -     Mammo Screening Bilateral With CAD; Future    7. Menopause  -     DEXA Bone Density Axial; Future    8. Immunization due  -     pneumococcal conj. 13-valent (PREVNAR-13) vaccine 0.5 mL        DM2 EYE exam has schedule 10/30/20  Up date mammo as hx of breast cancer;    -dm2 - continue medications, recheck labs  -hypertension - controlled, continue medications  -hypothyroidism - continue medication, recheck thyroid labs.  -due dexa scan  Pneumovax 2022, last 2017         -Follow up: 6 months and prn

## 2020-10-15 LAB
ALBUMIN SERPL-MCNC: 4.7 G/DL (ref 3.8–4.8)
ALBUMIN/CREAT UR: <13 MG/G CREAT (ref 0–29)
ALBUMIN/GLOB SERPL: 2 {RATIO} (ref 1.2–2.2)
ALP SERPL-CCNC: 70 IU/L (ref 39–117)
ALT SERPL-CCNC: 13 IU/L (ref 0–32)
AST SERPL-CCNC: 11 IU/L (ref 0–40)
BILIRUB SERPL-MCNC: 0.4 MG/DL (ref 0–1.2)
BUN SERPL-MCNC: 16 MG/DL (ref 8–27)
BUN/CREAT SERPL: 20 (ref 12–28)
CALCIUM SERPL-MCNC: 10.3 MG/DL (ref 8.7–10.3)
CHLORIDE SERPL-SCNC: 103 MMOL/L (ref 96–106)
CHOLEST SERPL-MCNC: 233 MG/DL (ref 100–199)
CO2 SERPL-SCNC: 23 MMOL/L (ref 20–29)
CREAT SERPL-MCNC: 0.8 MG/DL (ref 0.57–1)
CREAT UR-MCNC: 22.5 MG/DL
GLOBULIN SER CALC-MCNC: 2.4 G/DL (ref 1.5–4.5)
GLUCOSE SERPL-MCNC: 118 MG/DL (ref 65–99)
HBA1C MFR BLD: 7.1 % (ref 4.8–5.6)
HDLC SERPL-MCNC: 50 MG/DL
LDLC SERPL CALC-MCNC: 161 MG/DL (ref 0–99)
MICROALBUMIN UR-MCNC: <3 UG/ML
POTASSIUM SERPL-SCNC: 4.8 MMOL/L (ref 3.5–5.2)
PROT SERPL-MCNC: 7.1 G/DL (ref 6–8.5)
SODIUM SERPL-SCNC: 140 MMOL/L (ref 134–144)
TRIGL SERPL-MCNC: 120 MG/DL (ref 0–149)
TSH SERPL DL<=0.005 MIU/L-ACNC: 0.01 UIU/ML (ref 0.45–4.5)
VLDLC SERPL CALC-MCNC: 22 MG/DL (ref 5–40)

## 2020-10-23 ENCOUNTER — TELEPHONE (OUTPATIENT)
Dept: FAMILY MEDICINE CLINIC | Facility: CLINIC | Age: 65
End: 2020-10-23

## 2020-10-23 DIAGNOSIS — E03.8 OTHER SPECIFIED HYPOTHYROIDISM: Primary | ICD-10-CM

## 2020-10-23 DIAGNOSIS — E08.43 DIABETES MELLITUS DUE TO UNDERLYING CONDITION WITH DIABETIC AUTONOMIC NEUROPATHY, WITHOUT LONG-TERM CURRENT USE OF INSULIN (HCC): Primary | ICD-10-CM

## 2020-10-23 RX ORDER — ROSUVASTATIN CALCIUM 10 MG/1
10 TABLET, COATED ORAL NIGHTLY
Qty: 30 TABLET | Refills: 5 | Status: SHIPPED | OUTPATIENT
Start: 2020-10-23 | End: 2021-11-15 | Stop reason: SINTOL

## 2020-10-23 RX ORDER — LEVOTHYROXINE SODIUM 137 UG/1
150 TABLET ORAL DAILY
Qty: 30 TABLET | Refills: 5 | Status: SHIPPED | OUTPATIENT
Start: 2020-10-23 | End: 2021-04-22 | Stop reason: SDUPTHER

## 2020-10-23 NOTE — TELEPHONE ENCOUNTER
PATIENT CALLED AND IS REQUESTING THYROID LAB RESULTS. DR. WYMAN GAVE HER SAMPLES AND SHE IS OUT OF MEDICATION. SHE WOULD LIKE A CALL BACK WITH THE RESULTS AND ALSO IF DR. WYMAN WILL BE SENDING IN THYROID MEDICATION FOR HER.    PATIENT CALLBACK: [phone]    PHARMACY CONFIRMED:  [facility] [address] [address] - [phone]  - [phone] FX

## 2020-10-23 NOTE — PROGRESS NOTES
Diabetes is near goal, work on diet  Cholesterol is inadequately controlled.  To reduce stroke risk I would start rosuvastatin 10mg po daily  Thyroid still over replaced, I sent 137 mcg to pharmacy.  Recheck thyroid in 6 weeks, I put in order  Rest of labs normal or stable.

## 2020-10-23 NOTE — TELEPHONE ENCOUNTER
Diabetes is near goal, work on diet  Cholesterol is inadequately controlled.  To reduce stroke risk I would start rosuvastatin 10mg po daily  Thyroid still over replaced, I sent 137 mcg to pharmacy.  Recheck thyroid in 6 weeks, orders placed.  Rest of labs normal or stable.

## 2020-11-17 DIAGNOSIS — E11.9 TYPE 2 DIABETES MELLITUS WITHOUT COMPLICATION, WITHOUT LONG-TERM CURRENT USE OF INSULIN (HCC): ICD-10-CM

## 2020-11-17 RX ORDER — GLIPIZIDE 5 MG/1
TABLET, FILM COATED, EXTENDED RELEASE ORAL
Qty: 90 TABLET | Refills: 1 | Status: SHIPPED | OUTPATIENT
Start: 2020-11-17 | End: 2021-05-18

## 2020-11-30 DIAGNOSIS — E11.9 TYPE 2 DIABETES MELLITUS WITHOUT COMPLICATION, WITHOUT LONG-TERM CURRENT USE OF INSULIN (HCC): ICD-10-CM

## 2020-11-30 DIAGNOSIS — I10 ESSENTIAL (PRIMARY) HYPERTENSION: ICD-10-CM

## 2020-11-30 RX ORDER — LISINOPRIL 20 MG/1
TABLET ORAL
Qty: 90 TABLET | Refills: 1 | Status: SHIPPED | OUTPATIENT
Start: 2020-11-30 | End: 2021-05-18

## 2021-01-12 ENCOUNTER — TELEPHONE (OUTPATIENT)
Dept: ORTHOPEDIC SURGERY | Facility: CLINIC | Age: 66
End: 2021-01-12

## 2021-01-12 DIAGNOSIS — M53.3 CHRONIC SI JOINT PAIN: ICD-10-CM

## 2021-01-12 DIAGNOSIS — G89.29 CHRONIC SI JOINT PAIN: ICD-10-CM

## 2021-01-12 DIAGNOSIS — M54.31 RIGHT SIDED SCIATICA: Primary | ICD-10-CM

## 2021-01-12 DIAGNOSIS — M54.50 ACUTE LOW BACK PAIN, UNSPECIFIED BACK PAIN LATERALITY, UNSPECIFIED WHETHER SCIATICA PRESENT: ICD-10-CM

## 2021-01-12 NOTE — TELEPHONE ENCOUNTER
Caller: PATIENT    Relationship to patient: SELF    Best call back number: [phone]    Chief complaint: SEVERE LOW BACK PAIN AND WEAKNESS.     Additional notes: PATIENT CALLED TO SCHEDULE AN APPT WITH DR. ROSALES FOR HER SEVERE LOW BACK PAIN AND WEAKNESS. I MENTIONED TO PT THAT DR. ROSALES DOES NOT TREAT THIS DX BUT DR. ESPOSITO IN OUR OFFICE DOES. I TOLD PT ABOUT THE REFERRAL PROCESS FOR DR. ESPOSITO AND PT WAS WANTING TO KNOW IF DR. ROSALES COULD PUT IN A REFERRAL FOR HER TO SEE DR. ESPOSITO. PT STATED THAT HER PCP DOCTOR DID NOT DO ANYTHING BEFORE FOR HER HIP PAIN AND DIDN'T THINK HER PCP WOULD BE HELPFUL ON GETTING THAT REFERRAL IN. PATIENT WAS HOPING DR. ROSALES COULD PUT IN A REFERRAL FOR HER TO SEE DR. ESPOSITO FOR HER SEVERE LOW BACK PAIN. PLEASE ADVISE.

## 2021-03-05 ENCOUNTER — OFFICE VISIT (OUTPATIENT)
Dept: ORTHOPEDIC SURGERY | Facility: CLINIC | Age: 66
End: 2021-03-05

## 2021-03-05 VITALS — TEMPERATURE: 97 F | WEIGHT: 199.08 LBS | BODY MASS INDEX: 31.99 KG/M2 | HEIGHT: 66 IN

## 2021-03-05 DIAGNOSIS — M54.41 LOW BACK PAIN WITH BILATERAL SCIATICA, UNSPECIFIED BACK PAIN LATERALITY, UNSPECIFIED CHRONICITY: ICD-10-CM

## 2021-03-05 DIAGNOSIS — R52 PAIN: Primary | ICD-10-CM

## 2021-03-05 DIAGNOSIS — M41.9 ACQUIRED SCOLIOSIS: ICD-10-CM

## 2021-03-05 DIAGNOSIS — M54.42 LOW BACK PAIN WITH BILATERAL SCIATICA, UNSPECIFIED BACK PAIN LATERALITY, UNSPECIFIED CHRONICITY: ICD-10-CM

## 2021-03-05 PROCEDURE — 72100 X-RAY EXAM L-S SPINE 2/3 VWS: CPT | Performed by: ORTHOPAEDIC SURGERY

## 2021-03-05 PROCEDURE — 99213 OFFICE O/P EST LOW 20 MIN: CPT | Performed by: ORTHOPAEDIC SURGERY

## 2021-03-05 NOTE — PROGRESS NOTES
New patient or new problem visit    CC: Low back pain right hip pain    HPI: She has chronic intermittent back pain which has become more frequent and accompanied by radiation to the right hip or which she calls the sacroiliac joint.    PFSH: See attached    ROS: No balance difficulties bowel or bladder complaints    PE: Mild tenderness in the lumbosacral midline to deep palpation.  Absent patellar reflexes but good strength in the legs bilaterally.  Figure 4 test negative on the left cannot really perform this on the right because of some stiffness in her hip replacement.    XRAY: Plain film x-rays show lumbosacral mild disc degeneration and very slight well-balanced scoliosis.  No comparison views are available    Other: n/a    Impression: Lumbar disc degeneration, mild lumbar degenerative scoliosis    Plan: For now physical therapy.  If she fails to improve we will get an MRI of the lumbar spine.

## 2021-03-12 ENCOUNTER — OFFICE VISIT (OUTPATIENT)
Dept: FAMILY MEDICINE CLINIC | Facility: CLINIC | Age: 66
End: 2021-03-12

## 2021-03-12 VITALS
TEMPERATURE: 96.9 F | HEIGHT: 66 IN | SYSTOLIC BLOOD PRESSURE: 128 MMHG | WEIGHT: 207 LBS | BODY MASS INDEX: 33.27 KG/M2 | OXYGEN SATURATION: 99 % | DIASTOLIC BLOOD PRESSURE: 74 MMHG | HEART RATE: 90 BPM

## 2021-03-12 DIAGNOSIS — F41.9 ANXIETY: Primary | ICD-10-CM

## 2021-03-12 PROCEDURE — 99213 OFFICE O/P EST LOW 20 MIN: CPT | Performed by: NURSE PRACTITIONER

## 2021-03-12 RX ORDER — FLUOXETINE HYDROCHLORIDE 20 MG/1
20 CAPSULE ORAL DAILY
Qty: 30 CAPSULE | Refills: 0 | Status: SHIPPED | OUTPATIENT
Start: 2021-03-12 | End: 2021-04-07

## 2021-03-12 NOTE — PROGRESS NOTES
"Subjective   Jnenie Lofton is a 66 y.o. female who presents for a follow up on anxiety.    History of Present Illness   In the last few months has taken 10 xanax. When takes feels normal. Sister takes prozac. When takes a few days in a row feels normal. Bad in last few months, though unsure of trigger. Nothing is different. Gets nervous to take shower in own home. Did not feel comfortable discussing with RRJ. Did not try sertraline as discussed in 2019. Still working with same xanax rx, but miserable. 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  The following portions of the patient's history were reviewed and updated as appropriate: allergies, current medications, past family history, past medical history, past social history, past surgical history and problem list.    Review of Systems   Constitutional: Positive for fatigue. Negative for activity change and fever.   Neurological: Negative for weakness and headaches.   Hematological: Negative.    Psychiatric/Behavioral: Positive for behavioral problems, dysphoric mood and sleep disturbance. Negative for agitation, confusion, decreased concentration, hallucinations, self-injury and suicidal ideas. The patient is nervous/anxious.      /74   Pulse 90   Temp 96.9 °F (36.1 °C) (Infrared)   Ht 167.6 cm (65.98\")   Wt 93.9 kg (207 lb)   SpO2 99%   BMI 33.43 kg/m²     Objective   Physical Exam  Vitals and nursing note reviewed.   Constitutional:       Appearance: She is obese. She is not ill-appearing or diaphoretic.   Neurological:      General: No focal deficit present.      Mental Status: She is alert and oriented to person, place, and time. Mental status is at baseline.   Psychiatric:         Attention and Perception: Attention and perception normal.         Mood and Affect: Mood is anxious and depressed. Affect is tearful.         Speech: Speech normal.         " Behavior: Behavior normal. Behavior is cooperative.         Thought Content: Thought content normal.         Cognition and Memory: Cognition and memory normal.       Assessment/Plan   Problems Addressed this Visit        Mental Health    Anxiety - Primary    Relevant Medications    FLUoxetine (PROzac) 20 MG capsule      Diagnoses       Codes Comments    Anxiety    -  Primary ICD-10-CM: F41.9  ICD-9-CM: 300.00         Has 10 xanax left, would refill a few more, but discussed long term adverse profile. Recommend rotate to a lower risk long term solution as ongoing. Sister is on prozac and doing well. Will start low dose. FU 1 month if insufficient relief. Sooner if troublesome side effects (discussed) or worsening mood.

## 2021-04-07 DIAGNOSIS — F41.9 ANXIETY: ICD-10-CM

## 2021-04-07 RX ORDER — FLUOXETINE HYDROCHLORIDE 20 MG/1
CAPSULE ORAL
Qty: 30 CAPSULE | Refills: 0 | Status: SHIPPED | OUTPATIENT
Start: 2021-04-07 | End: 2021-05-05 | Stop reason: SDUPTHER

## 2021-04-07 RX ORDER — ALPRAZOLAM 0.25 MG/1
TABLET ORAL
Qty: 15 TABLET | Refills: 0 | Status: SHIPPED | OUTPATIENT
Start: 2021-04-07 | End: 2022-02-02

## 2021-04-22 ENCOUNTER — OFFICE VISIT (OUTPATIENT)
Dept: FAMILY MEDICINE CLINIC | Facility: CLINIC | Age: 66
End: 2021-04-22

## 2021-04-22 VITALS
WEIGHT: 199 LBS | SYSTOLIC BLOOD PRESSURE: 122 MMHG | OXYGEN SATURATION: 98 % | HEART RATE: 86 BPM | DIASTOLIC BLOOD PRESSURE: 68 MMHG | BODY MASS INDEX: 33.15 KG/M2 | HEIGHT: 65 IN

## 2021-04-22 DIAGNOSIS — E03.8 OTHER SPECIFIED HYPOTHYROIDISM: ICD-10-CM

## 2021-04-22 DIAGNOSIS — I10 ESSENTIAL (PRIMARY) HYPERTENSION: ICD-10-CM

## 2021-04-22 DIAGNOSIS — E11.9 ENCOUNTER FOR DIABETIC FOOT EXAM (HCC): ICD-10-CM

## 2021-04-22 DIAGNOSIS — E78.2 MIXED HYPERLIPIDEMIA: ICD-10-CM

## 2021-04-22 DIAGNOSIS — Z00.00 MEDICARE ANNUAL WELLNESS VISIT, SUBSEQUENT: Primary | ICD-10-CM

## 2021-04-22 DIAGNOSIS — E11.9 TYPE 2 DIABETES MELLITUS WITHOUT COMPLICATION, WITHOUT LONG-TERM CURRENT USE OF INSULIN (HCC): ICD-10-CM

## 2021-04-22 PROCEDURE — G0439 PPPS, SUBSEQ VISIT: HCPCS | Performed by: FAMILY MEDICINE

## 2021-04-22 PROCEDURE — 1160F RVW MEDS BY RX/DR IN RCRD: CPT | Performed by: FAMILY MEDICINE

## 2021-04-22 PROCEDURE — 1170F FXNL STATUS ASSESSED: CPT | Performed by: FAMILY MEDICINE

## 2021-04-22 PROCEDURE — 99214 OFFICE O/P EST MOD 30 MIN: CPT | Performed by: FAMILY MEDICINE

## 2021-04-22 RX ORDER — LEVOTHYROXINE SODIUM 137 UG/1
150 TABLET ORAL DAILY
Qty: 30 TABLET | Refills: 5 | Status: SHIPPED | OUTPATIENT
Start: 2021-04-22 | End: 2021-10-14

## 2021-04-22 NOTE — PROGRESS NOTES
QUICK REFERENCE INFORMATION:  The ABCs of the Annual Wellness Visit    Subsequent Medicare Wellness Visit    HEALTH RISK ASSESSMENT    1955    Recent Hospitalizations:  No hospitalization(s) within the last year..        Current Medical Providers:  Patient Care Team:  Frank Saenz DO as PCP - General (Family Medicine)        Smoking Status:  Social History     Tobacco Use   Smoking Status Former Smoker   • Packs/day: 1.00   • Years: 4.00   • Pack years: 4.00   • Start date: 1973   • Quit date: 1977   • Years since quittin.6   Smokeless Tobacco Never Used   Tobacco Comment    college years [id number]       Alcohol Consumption:  Social History     Substance and Sexual Activity   Alcohol Use Yes   • Alcohol/week: 2.0 standard drinks   • Types: 1 Glasses of wine, 1 Cans of beer per week    Comment: social/ wine/beer occasionally       Depression Screen:   PHQ-2/PHQ-9 Depression Screening 2021   Little interest or pleasure in doing things 0   Feeling down, depressed, or hopeless 0   Trouble falling or staying asleep, or sleeping too much 0   Feeling tired or having little energy 0   Poor appetite or overeating 0   Feeling bad about yourself - or that you are a failure or have let yourself or your family down 0   Trouble concentrating on things, such as reading the newspaper or watching television 0   Moving or speaking so slowly that other people could have noticed. Or the opposite - being so fidgety or restless that you have been moving around a lot more than usual 0   Thoughts that you would be better off dead, or of hurting yourself in some way 0   Total Score 0   If you checked off any problems, how difficult have these problems made it for you to do your work, take care of things at home, or get along with other people? Not difficult at all       Health Habits and Functional and Cognitive Screening:  Functional & Cognitive Status 2021   Do you have difficulty preparing food and  eating? No   Do you have difficulty bathing yourself, getting dressed or grooming yourself? No   Do you have difficulty using the toilet? No   Do you have difficulty moving around from place to place? No   Do you have trouble with steps or getting out of a bed or a chair? No   Current Diet Well Balanced Diet   Dental Exam Up to date   Eye Exam Up to date   Exercise (times per week) 3 times per week   Current Exercises Include Walking   Do you need help using the phone?  No   Are you deaf or do you have serious difficulty hearing?  No   Do you need help with transportation? No   Do you need help shopping? No   Do you need help preparing meals?  No   Do you need help with housework?  No   Do you need help with laundry? No   Do you need help taking your medications? No   Do you need help managing money? No   Do you ever drive or ride in a car without wearing a seat belt? No   Have you felt unusual stress, anger or loneliness in the last month? No   Who do you live with? Spouse   If you need help, do you have trouble finding someone available to you? No   Have you been bothered in the last four weeks by sexual problems? No   Do you have difficulty concentrating, remembering or making decisions? No           Does the patient have evidence of cognitive impairment? No    Aspirin use counseling: Does not need ASA (and currently is not on it)      Recent Lab Results:  CMP:  Lab Results   Component Value Date     (H) 10/14/2020    BUN 16 10/14/2020    CREATININE 0.80 10/14/2020    EGFRIFNONA 78 10/14/2020    EGFRIFAFRI 89 10/14/2020    BCR 20 10/14/2020     10/14/2020    K 4.8 10/14/2020    CO2 23 10/14/2020    CALCIUM 10.3 10/14/2020    PROTENTOTREF 7.1 10/14/2020    ALBUMIN 4.7 10/14/2020    LABGLOBREF 2.4 10/14/2020    LABIL2 2.0 10/14/2020    BILITOT 0.4 10/14/2020    ALKPHOS 70 10/14/2020    AST 11 10/14/2020    ALT 13 10/14/2020     Lipid Panel:  Lab Results   Component Value Date    TRIG 120 10/14/2020     HDL 50 10/14/2020    VLDL 22 10/14/2020     HbA1c:  Lab Results   Component Value Date    HGBA1C 7.1 (H) 10/14/2020       Visual Acuity:  No exam data present    Age-appropriate Screening Schedule:  Refer to the list below for future screening recommendations based on patient's age, sex and/or medical conditions. Orders for these recommended tests are listed in the plan section. The patient has been provided with a written plan.    Health Maintenance   Topic Date Due   • DXA SCAN  Never done   • HEMOGLOBIN A1C  04/14/2021   • INFLUENZA VACCINE  08/01/2021   • LIPID PANEL  10/14/2021   • URINE MICROALBUMIN  10/14/2021   • DIABETIC EYE EXAM  10/30/2021   • DIABETIC FOOT EXAM  04/22/2022   • MAMMOGRAM  03/03/2023   • COLONOSCOPY  05/07/2026   • TDAP/TD VACCINES (2 - Td) 09/08/2027   • PAP SMEAR  Discontinued        Subjective   History of Present Illness    Jennie Lofton is a 66 y.o. female who presents for an Subsequent Wellness Visit.    The following portions of the patient's history were reviewed and updated as appropriate: allergies, current medications, past family history, past medical history, past social history, past surgical history and problem list.    Outpatient Medications Prior to Visit   Medication Sig Dispense Refill   • Accu-Chek Softclix Lancets lancets USE ONE LANCET TO TEST DAILY 100 each 11   • ALPRAZolam (XANAX) 0.25 MG tablet TAKE ONE TABLET BY MOUTH TWICE A DAY AS NEEDED FOR ANXIETY 15 tablet 0   • Cholecalciferol (VITAMIN D3) 125 MCG (5000 UT) capsule capsule TAKE ONE CAPSULE BY MOUTH DAILY 90 capsule 0   • FLUoxetine (PROzac) 20 MG capsule TAKE ONE CAPSULE BY MOUTH DAILY 30 capsule 0   • glipizide (GLUCOTROL XL) 5 MG ER tablet TAKE ONE TABLET BY MOUTH DAILY 90 tablet 1   • glucose blood (Accu-Chek Maddison Plus) test strip 1 each by Other route Daily. USE ONE STRIP TO TEST DAILY 100 each 3   • levothyroxine (SYNTHROID, LEVOTHROID) 137 MCG tablet Take 1 tablet by mouth Daily. 30 tablet 5   •  lisinopril (PRINIVIL,ZESTRIL) 20 MG tablet TAKE ONE TABLET BY MOUTH DAILY 90 tablet 1   • Multiple Vitamins-Minerals (PRESERVISION AREDS 2 PO) Take  by mouth.     • rosuvastatin (Crestor) 10 MG tablet Take 1 tablet by mouth Every Night. 30 tablet 5   • SITagliptin (Januvia) 100 MG tablet Take 1 tablet by mouth Daily. 90 tablet 3     No facility-administered medications prior to visit.       Patient Active Problem List   Diagnosis   • Intractable vomiting with nausea   • Lactic acidosis   • Diabetes mellitus due to underlying condition with diabetic autonomic neuropathy, without long-term current use of insulin (CMS/[facility])   • Hyponatremia   • Hypothyroid   • Essential (primary) hypertension   • Anxiety   • Breast mass   • Breast pain   • Luetscher's syndrome   • DM II (diabetes mellitus, type II), controlled (CMS/[facility])   • Encounter for screening colonoscopy   • History of breast cancer   • Syncope   • UTI (urinary tract infection)   • Arthritis of right hip   • Status post right hip replacement   • Right sided sciatica   • Chronic SI joint pain       Advance Care Planning:  ACP discussion was held with the patient during this visit. Patient does not have an advance directive, information provided.    Identification of Risk Factors:  Risk factors include: Obesity/Overweight .    Review of Systems   Respiratory: Negative for shortness of breath.    Cardiovascular: Negative for chest pain, palpitations, orthopnea and PND.       Compared to one year ago, the patient feels her physical health is the same.  Compared to one year ago, the patient feels her mental health is the same.    Objective     Physical Exam  Vitals and nursing note reviewed.   Constitutional:       Appearance: She is well-developed.   HENT:      Head: Normocephalic and atraumatic.   Neck:      Thyroid: No thyromegaly.      Vascular: No JVD.   Cardiovascular:      Rate and Rhythm: Normal rate and regular rhythm.      Heart sounds: Normal heart sounds. No  "murmur heard.   No friction rub. No gallop.    Pulmonary:      Effort: Pulmonary effort is normal. No respiratory distress.      Breath sounds: Normal breath sounds. No wheezing or rales.   Abdominal:      General: Bowel sounds are normal. There is no distension.      Palpations: Abdomen is soft.      Tenderness: There is no abdominal tenderness. There is no guarding or rebound.   Musculoskeletal:      Cervical back: Neck supple.   Feet:      Comments: Diabetic foot exam and monofilament completed, see scanned report.      Skin:     General: Skin is warm and dry.   Neurological:      Mental Status: She is alert.   Psychiatric:         Behavior: Behavior normal.         Vitals:    04/22/21 1100   BP: 122/68   Pulse: 86   SpO2: 98%   Weight: 90.3 kg (199 lb)   Height: 165.1 cm (65\")   PainSc: 0-No pain       Patient's Body mass index is 33.12 kg/m². BMI is above normal parameters. Recommendations include: educational material.      Assessment/Plan   Patient Self-Management and Personalized Health Advice  The patient has been provided with information about: diet and exercise and preventive services including:   · Annual Wellness Visit (AWV).    Visit Diagnoses:    ICD-10-CM ICD-9-CM   1. Medicare annual wellness visit, subsequent  Z00.00 V70.0   2. Type 2 diabetes mellitus without complication, without long-term current use of insulin (CMS/[facility])  E11.9 250.00   3. Essential (primary) hypertension  I10 401.9   4. Other specified hypothyroidism  E03.8 244.8   5. Mixed hyperlipidemia  E78.2 272.2   6. Encounter for diabetic foot exam (CMS/[facility])  E11.9 250.00       Orders Placed This Encounter   Procedures   • Comprehensive Metabolic Panel     Order Specific Question:   Release to patient     Answer:   Immediate   • Hemoglobin A1c     Order Specific Question:   Release to patient     Answer:   Immediate   • Lipid Panel   • Microalbumin / Creatinine Urine Ratio - Urine, Clean Catch     Order Specific Question:   Release to " patient     Answer:   Immediate   • TSH     Order Specific Question:   Release to patient     Answer:   Immediate       Outpatient Encounter Medications as of 4/22/2021   Medication Sig Dispense Refill   • Accu-Chek Softclix Lancets lancets USE ONE LANCET TO TEST DAILY 100 each 11   • ALPRAZolam (XANAX) 0.25 MG tablet TAKE ONE TABLET BY MOUTH TWICE A DAY AS NEEDED FOR ANXIETY 15 tablet 0   • Cholecalciferol (VITAMIN D3) 125 MCG (5000 UT) capsule capsule TAKE ONE CAPSULE BY MOUTH DAILY 90 capsule 0   • FLUoxetine (PROzac) 20 MG capsule TAKE ONE CAPSULE BY MOUTH DAILY 30 capsule 0   • glipizide (GLUCOTROL XL) 5 MG ER tablet TAKE ONE TABLET BY MOUTH DAILY 90 tablet 1   • glucose blood (Accu-Chek Maddison Plus) test strip 1 each by Other route Daily. USE ONE STRIP TO TEST DAILY 100 each 3   • levothyroxine (SYNTHROID, LEVOTHROID) 137 MCG tablet Take 1 tablet by mouth Daily. 30 tablet 5   • lisinopril (PRINIVIL,ZESTRIL) 20 MG tablet TAKE ONE TABLET BY MOUTH DAILY 90 tablet 1   • Multiple Vitamins-Minerals (PRESERVISION AREDS 2 PO) Take  by mouth.     • rosuvastatin (Crestor) 10 MG tablet Take 1 tablet by mouth Every Night. 30 tablet 5   • SITagliptin (Januvia) 100 MG tablet Take 1 tablet by mouth Daily. 90 tablet 3     No facility-administered encounter medications on file as of 4/22/2021.       Reviewed use of high risk medication in the elderly: yes  Reviewed for potential of harmful drug interactions in the elderly: yes    Follow Up:  Return in about 6 months (around 10/22/2021), or if symptoms worsen or fail to improve.     An After Visit Summary and PPPS with all of these plans were given to the patient.           ++++++++++++++++++++++++++++++++++++++++++++++++++++++++++++++++++     Chief Complaint   Patient presents with   • Annual Exam     medicare   • Diabetes   • Anxiety   • Hypertension   • Hyperlipidemia     Diabetes  She presents for her follow-up diabetic visit. She has type 2 diabetes mellitus. Her disease  course has been stable. Pertinent negatives for diabetes include no chest pain, no foot paresthesias and no foot ulcerations. Symptoms are stable. Risk factors for coronary artery disease include dyslipidemia, diabetes mellitus and hypertension.   Anxiety  Presents for follow-up visit. Patient reports no chest pain, palpitations or shortness of breath.       Hypertension  This is a chronic problem. The current episode started more than 1 year ago. The problem is unchanged. The problem is controlled. Associated symptoms include anxiety. Pertinent negatives include no chest pain, orthopnea, palpitations, peripheral edema, PND or shortness of breath. The current treatment provides moderate improvement. Identifiable causes of hypertension include a thyroid problem.   Hyperlipidemia  This is a chronic problem. The current episode started more than 1 year ago. The problem is controlled. Recent lipid tests were reviewed and are normal. Exacerbating diseases include diabetes, hypothyroidism and obesity. Pertinent negatives include no chest pain or shortness of breath. Current antihyperlipidemic treatment includes statins. The current treatment provides moderate improvement of lipids. Risk factors for coronary artery disease include hypertension, dyslipidemia, diabetes mellitus and post-menopausal.   Hypothyroidism  This is a chronic problem. The current episode started more than 1 year ago. Pertinent negatives include no chest pain. Treatments tried: On levothyroxine.       Review of Systems   Cardiovascular: Negative for chest pain, orthopnea, palpitations and paroxysmal nocturnal dyspnea.   Respiratory: Negative for shortness of breath.        Social History     Tobacco Use   • Smoking status: Former Smoker     Packs/day: 1.00     Years: 4.00     Pack years: 4.00     Start date: 1973     Quit date: 1977     Years since quittin.6   • Smokeless tobacco: Never Used   • Tobacco comment: college years [id number]  "  Substance Use Topics   • Alcohol use: Yes     Alcohol/week: 2.0 standard drinks     Types: 1 Glasses of wine, 1 Cans of beer per week     Comment: social/ wine/beer occasionally     O:   Vitals:    04/22/21 1100   BP: 122/68   Pulse: 86   SpO2: 98%   Weight: 90.3 kg (199 lb)   Height: 165.1 cm (65\")   PainSc: 0-No pain     Body mass index is 33.12 kg/m².  Vitals and nursing note reviewed.   Constitutional:       Appearance: Well-developed.   HENT:      Head: Normocephalic and atraumatic.   Neck:      Thyroid: No thyromegaly.      Vascular: No JVD.   Pulmonary:      Effort: Pulmonary effort is normal. No respiratory distress.      Breath sounds: Normal breath sounds. No wheezing. No rales.   Cardiovascular:      Normal rate. Regular rhythm. Normal heart sounds.      No gallop. No friction rub.   Abdominal:      General: Bowel sounds are normal. There is no distension.      Palpations: Abdomen is soft.      Tenderness: There is no abdominal tenderness. There is no guarding or rebound.   Musculoskeletal:      Cervical back: Neck supple. Skin:     General: Skin is warm and dry.   Feet:      Comments: Diabetic foot exam and monofilament completed, see scanned report.      Neurological:      Mental Status: Alert.   Psychiatric:         Behavior: Behavior normal.         Diagnoses and all orders for this visit:    1. Medicare annual wellness visit, subsequent (Primary)    2. Type 2 diabetes mellitus without complication, without long-term current use of insulin (CMS/[facility])  -     Comprehensive Metabolic Panel  -     Hemoglobin A1c  -     Lipid Panel  -     Microalbumin / Creatinine Urine Ratio - Urine, Clean Catch    3. Essential (primary) hypertension  -     Comprehensive Metabolic Panel    4. Other specified hypothyroidism  -     TSH    5. Mixed hyperlipidemia  -     Lipid Panel    6. Encounter for diabetic foot exam (CMS/[facility])    -dm2 - continue medications, recheck labs  -hypertension - controlled, continue " medications  -hypothyroidism - continue medication, recheck thyroid labs.  -HLD - continue statin, recheck lipids.    -may need brand only fo rthyroid as fluctuating a lot.  Sent in while await labs for now.      Return in about 6 months (around 10/22/2021), or if symptoms worsen or fail to improve.

## 2021-04-22 NOTE — PATIENT INSTRUCTIONS
Advance Directive    Advance directives are legal documents that let you make choices ahead of time about your health care and medical treatment in case you become unable to communicate for yourself. Advance directives are a way for you to make known your wishes to family, friends, and health care providers. This can let others know about your end-of-life care if you become unable to communicate.  Discussing and writing advance directives should happen over time rather than all at once. Advance directives can be changed depending on your situation and what you want, even after you have signed the advance directives.  There are different types of advance directives, such as:  · Medical power of .  · Living will.  · Do not resuscitate (DNR) or do not attempt resuscitation (DNAR) order.  Health care proxy and medical power of   A health care proxy is also called a health care agent. This is a person who is appointed to make medical decisions for you in cases where you are unable to make the decisions yourself. Generally, people choose someone they know well and trust to represent their preferences. Make sure to ask this person for an agreement to act as your proxy. A proxy may have to exercise judgment in the event of a medical decision for which your wishes are not known.  A medical power of  is a legal document that names your health care proxy. Depending on the laws in your state, after the document is written, it may also need to be:  · Signed.  · Notarized.  · Dated.  · Copied.  · Witnessed.  · Incorporated into your medical record.  You may also want to appoint someone to manage your money in a situation in which you are unable to do so. This is called a durable power of  for finances. It is a separate legal document from the durable power of  for health care. You may choose the same person or someone different from your health care proxy to act as your agent in money  matters.  If you do not appoint a proxy, or if there is a concern that the proxy is not acting in your best interests, a court may appoint a guardian to act on your behalf.  Living will  A living will is a set of instructions that state your wishes about medical care when you cannot express them yourself. Health care providers should keep a copy of your living will in your medical record. You may want to give a copy to family members or friends. To alert caregivers in case of an emergency, you can place a card in your wallet to let them know that you have a living will and where they can find it. A living will is used if you become:  · Terminally ill.  · Disabled.  · Unable to communicate or make decisions.  Items to consider in your living will include:  · To use or not to use life-support equipment, such as dialysis machines and breathing machines (ventilators).  · A DNR or DNAR order. This tells health care providers not to use cardiopulmonary resuscitation (CPR) if breathing or heartbeat stops.  · To use or not to use tube feeding.  · To be given or not to be given food and fluids.  · Comfort (palliative) care when the goal becomes comfort rather than a cure.  · Donation of organs and tissues.  A living will does not give instructions for distributing your money and property if you should pass away.  DNR or DNAR  A DNR or DNAR order is a request not to have CPR in the event that your heart stops beating or you stop breathing. If a DNR or DNAR order has not been made and shared, a health care provider will try to help any patient whose heart has stopped or who has stopped breathing. If you plan to have surgery, talk with your health care provider about how your DNR or DNAR order will be followed if problems occur.  What if I do not have an advance directive?  If you do not have an advance directive, some states assign family decision makers to act on your behalf based on how closely you are related to them. Each  state has its own laws about advance directives. You may want to check with your health care provider, , or state representative about the laws in your state.  Summary  · Advance directives are the legal documents that allow you to make choices ahead of time about your health care and medical treatment in case you become unable to tell others about your care.  · The process of discussing and writing advance directives should happen over time. You can change the advance directives, even after you have signed them.  · Advance directives include DNR or DNAR orders, living wallace, and designating an agent as your medical power of .  This information is not intended to replace advice given to you by your health care provider. Make sure you discuss any questions you have with your health care provider.  Document Revised: 01/28/2021 Document Reviewed: 07/16/2020  [employer] Patient Education © 2021 [employer]      Calorie Counting for Weight Loss  Calories are units of energy. Your body needs a certain amount of calories from food to keep you going throughout the day. When you eat more calories than your body needs, your body stores the extra calories as fat. When you eat fewer calories than your body needs, your body burns fat to get the energy it needs.  Calorie counting means keeping track of how many calories you eat and drink each day. Calorie counting can be helpful if you need to lose weight. If you make sure to eat fewer calories than your body needs, you should lose weight. Ask your health care provider what a healthy weight is for you.  For calorie counting to work, you will need to eat the right number of calories in a day in order to lose a healthy amount of weight per week. A dietitian can help you determine how many calories you need in a day and will give you suggestions on how to reach your calorie goal.  · A healthy amount of weight to lose per week is usually 1-2 lb (0.5-0.9 kg). This  usually means that your daily calorie intake should be reduced by 500-750 calories.  · Eating 1,200 - 1,500 calories per day can help most women lose weight.  · Eating 1,500 - 1,800 calories per day can help most men lose weight.  What is my plan?  My goal is to have __________ calories per day.  If I have this many calories per day, I should lose around __________ pounds per week.  What do I need to know about calorie counting?  In order to meet your daily calorie goal, you will need to:  · Find out how many calories are in each food you would like to eat. Try to do this before you eat.  · Decide how much of the food you plan to eat.  · Write down what you ate and how many calories it had. Doing this is called keeping a food log.  To successfully lose weight, it is important to balance calorie counting with a healthy lifestyle that includes regular activity. Aim for 150 minutes of moderate exercise (such as walking) or 75 minutes of vigorous exercise (such as running) each week.  Where do I find calorie information?    The number of calories in a food can be found on a Nutrition Facts label. If a food does not have a Nutrition Facts label, try to look up the calories online or ask your dietitian for help.  Remember that calories are listed per serving. If you choose to have more than one serving of a food, you will have to multiply the calories per serving by the amount of servings you plan to eat. For example, the label on a package of bread might say that a serving size is 1 slice and that there are 90 calories in a serving. If you eat 1 slice, you will have eaten 90 calories. If you eat 2 slices, you will have eaten 180 calories.  How do I keep a food log?  Immediately after each meal, record the following information in your food log:  · What you ate. Don't forget to include toppings, sauces, and other extras on the food.  · How much you ate. This can be measured in cups, ounces, or number of items.  · How many  "calories each food and drink had.  · The total number of calories in the meal.  Keep your food log near you, such as in a small notebook in your pocket, or use a mobile león or website. Some programs will calculate calories for you and show you how many calories you have left for the day to meet your goal.  What are some calorie counting tips?    · Use your calories on foods and drinks that will fill you up and not leave you hungry:  ? Some examples of foods that fill you up are nuts and nut butters, vegetables, lean proteins, and high-fiber foods like whole grains. High-fiber foods are foods with more than 5 g fiber per serving.  ? Drinks such as sodas, specialty coffee drinks, alcohol, and juices have a lot of calories, yet do not fill you up.  · Eat nutritious foods and avoid empty calories. Empty calories are calories you get from foods or beverages that do not have many vitamins or protein, such as candy, sweets, and soda. It is better to have a nutritious high-calorie food (such as an avocado) than a food with few nutrients (such as a bag of chips).  · Know how many calories are in the foods you eat most often. This will help you calculate calorie counts faster.  · Pay attention to calories in drinks. Low-calorie drinks include water and unsweetened drinks.  · Pay attention to nutrition labels for \"low fat\" or \"fat free\" foods. These foods sometimes have the same amount of calories or more calories than the full fat versions. They also often have added sugar, starch, or salt, to make up for flavor that was removed with the fat.  · Find a way of tracking calories that works for you. Get creative. Try different apps or programs if writing down calories does not work for you.  What are some portion control tips?  · Know how many calories are in a serving. This will help you know how many servings of a certain food you can have.  · Use a measuring cup to measure serving sizes. You could also try weighing out " portions on a kitchen scale. With time, you will be able to estimate serving sizes for some foods.  · Take some time to put servings of different foods on your favorite plates, bowls, and cups so you know what a serving looks like.  · Try not to eat straight from a bag or box. Doing this can lead to overeating. Put the amount you would like to eat in a cup or on a plate to make sure you are eating the right portion.  · Use smaller plates, glasses, and bowls to prevent overeating.  · Try not to multitask (for example, watch TV or use your computer) while eating. If it is time to eat, sit down at a table and enjoy your food. This will help you to know when you are full. It will also help you to be aware of what you are eating and how much you are eating.  What are tips for following this plan?  Reading food labels  · Check the calorie count compared to the serving size. The serving size may be smaller than what you are used to eating.  · Check the source of the calories. Make sure the food you are eating is high in vitamins and protein and low in saturated and trans fats.  Shopping  · Read nutrition labels while you shop. This will help you make healthy decisions before you decide to purchase your food.  · Make a grocery list and stick to it.  Cooking  · Try to cook your favorite foods in a healthier way. For example, try baking instead of frying.  · Use low-fat dairy products.  Meal planning  · Use more fruits and vegetables. Half of your plate should be fruits and vegetables.  · Include lean proteins like poultry and fish.  How do I count calories when eating out?  · Ask for smaller portion sizes.  · Consider sharing an entree and sides instead of getting your own entree.  · If you get your own entree, eat only half. Ask for a box at the beginning of your meal and put the rest of your entree in it so you are not tempted to eat it.  · If calories are listed on the menu, choose the lower calorie options.  · Choose  "dishes that include vegetables, fruits, whole grains, low-fat dairy products, and lean protein.  · Choose items that are boiled, broiled, grilled, or steamed. Stay away from items that are buttered, battered, fried, or served with cream sauce. Items labeled \"crispy\" are usually fried, unless stated otherwise.  · Choose water, low-fat milk, unsweetened iced tea, or other drinks without added sugar. If you want an alcoholic beverage, choose a lower calorie option such as a glass of wine or light beer.  · Ask for dressings, sauces, and syrups on the side. These are usually high in calories, so you should limit the amount you eat.  · If you want a salad, choose a garden salad and ask for grilled meats. Avoid extra toppings like dorsey, cheese, or fried items. Ask for the dressing on the side, or ask for olive oil and vinegar or lemon to use as dressing.  · Estimate how many servings of a food you are given. For example, a serving of cooked rice is ½ cup or about the size of half a baseball. Knowing serving sizes will help you be aware of how much food you are eating at restaurants. The list below tells you how big or small some common portion sizes are based on everyday objects:  ? 1 oz--4 stacked dice.  ? 3 oz--1 deck of cards.  ? 1 tsp--1 die.  ? 1 Tbsp--½ a ping-pong ball.  ? 2 Tbsp--1 ping-pong ball.  ? ½ cup--½ baseball.  ? 1 cup--1 baseball.  Summary  · Calorie counting means keeping track of how many calories you eat and drink each day. If you eat fewer calories than your body needs, you should lose weight.  · A healthy amount of weight to lose per week is usually 1-2 lb (0.5-0.9 kg). This usually means reducing your daily calorie intake by 500-750 calories.  · The number of calories in a food can be found on a Nutrition Facts label. If a food does not have a Nutrition Facts label, try to look up the calories online or ask your dietitian for help.  · Use your calories on foods and drinks that will fill you up, and " not on foods and drinks that will leave you hungry.  · Use smaller plates, glasses, and bowls to prevent overeating.  This information is not intended to replace advice given to you by your health care provider. Make sure you discuss any questions you have with your health care provider.  Document Revised: 09/06/2019 Document Reviewed: 11/17/2017  [employer] Patient Education © 2020 [employer]      Exercising to Lose Weight  Exercise is structured, repetitive physical activity to improve fitness and health. Getting regular exercise is important for everyone. It is especially important if you are overweight. Being overweight increases your risk of heart disease, stroke, diabetes, high blood pressure, and several types of cancer. Reducing your calorie intake and exercising can help you lose weight.  Exercise is usually categorized as moderate or vigorous intensity. To lose weight, most people need to do a certain amount of moderate-intensity or vigorous-intensity exercise each week.  Moderate-intensity exercise    Moderate-intensity exercise is any activity that gets you moving enough to burn at least three times more energy (calories) than if you were sitting.  Examples of moderate exercise include:  · Walking a mile in 15 minutes.  · Doing light yard work.  · Biking at an easy pace.  Most people should get at least 150 minutes (2 hours and 30 minutes) a week of moderate-intensity exercise to maintain their body weight.  Vigorous-intensity exercise  Vigorous-intensity exercise is any activity that gets you moving enough to burn at least six times more calories than if you were sitting. When you exercise at this intensity, you should be working hard enough that you are not able to carry on a conversation.  Examples of vigorous exercise include:  · Running.  · Playing a team sport, such as football, basketball, and soccer.  · Jumping rope.  Most people should get at least 75 minutes (1 hour and 15 minutes) a week of  vigorous-intensity exercise to maintain their body weight.  How can exercise affect me?  When you exercise enough to burn more calories than you eat, you lose weight. Exercise also reduces body fat and builds muscle. The more muscle you have, the more calories you burn. Exercise also:  · Improves mood.  · Reduces stress and tension.  · Improves your overall fitness, flexibility, and endurance.  · Increases bone strength.  The amount of exercise you need to lose weight depends on:  · Your age.  · The type of exercise.  · Any health conditions you have.  · Your overall physical ability.  Talk to your health care provider about how much exercise you need and what types of activities are safe for you.  What actions can I take to lose weight?  Nutrition    · Make changes to your diet as told by your health care provider or diet and nutrition specialist (dietitian). This may include:  ? Eating fewer calories.  ? Eating more protein.  ? Eating less unhealthy fats.  ? Eating a diet that includes fresh fruits and vegetables, whole grains, low-fat dairy products, and lean protein.  ? Avoiding foods with added fat, salt, and sugar.  · Drink plenty of water while you exercise to prevent dehydration or heat stroke.  Activity  · Choose an activity that you enjoy and set realistic goals. Your health care provider can help you make an exercise plan that works for you.  · Exercise at a moderate or vigorous intensity most days of the week.  ? The intensity of exercise may vary from person to person. You can tell how intense a workout is for you by paying attention to your breathing and heartbeat. Most people will notice their breathing and heartbeat get faster with more intense exercise.  · Do resistance training twice each week, such as:  ? Push-ups.  ? Sit-ups.  ? Lifting weights.  ? Using resistance bands.  · Getting short amounts of exercise can be just as helpful as long structured periods of exercise. If you have trouble  finding time to exercise, try to include exercise in your daily routine.  ? Get up, stretch, and walk around every 30 minutes throughout the day.  ? Go for a walk during your lunch break.  ? Park your car farther away from your destination.  ? If you take public transportation, get off one stop early and walk the rest of the way.  ? Make phone calls while standing up and walking around.  ? Take the stairs instead of elevators or escalators.  · Wear comfortable clothes and shoes with good support.  · Do not exercise so much that you hurt yourself, feel dizzy, or get very short of breath.  Where to find more information  · [place] Department of Health and Human Services: www.hhs.gov  · Centers for Disease Control and Prevention (CDC): www.cdc.gov  Contact a health care provider:  · Before starting a new exercise program.  · If you have questions or concerns about your weight.  · If you have a medical problem that keeps you from exercising.  Get help right away if you have any of the following while exercising:  · Injury.  · Dizziness.  · Difficulty breathing or shortness of breath that does not go away when you stop exercising.  · Chest pain.  · Rapid heartbeat.  Summary  · Being overweight increases your risk of heart disease, stroke, diabetes, high blood pressure, and several types of cancer.  · Losing weight happens when you burn more calories than you eat.  · Reducing the amount of calories you eat in addition to getting regular moderate or vigorous exercise each week helps you lose weight.  This information is not intended to replace advice given to you by your health care provider. Make sure you discuss any questions you have with your health care provider.  Document Revised: 12/31/2018 Document Reviewed: 12/31/2018  [employer] Patient Education © 2021 [employer]

## 2021-04-23 LAB
ALBUMIN SERPL-MCNC: 4.9 G/DL (ref 3.5–5.2)
ALBUMIN/CREAT UR: <7 MG/G CREAT (ref 0–29)
ALBUMIN/GLOB SERPL: 2 G/DL
ALP SERPL-CCNC: 67 U/L (ref 39–117)
ALT SERPL-CCNC: 14 U/L (ref 1–33)
AST SERPL-CCNC: 12 U/L (ref 1–32)
BILIRUB SERPL-MCNC: 0.5 MG/DL (ref 0–1.2)
BUN SERPL-MCNC: 15 MG/DL (ref 8–23)
BUN/CREAT SERPL: 18.3 (ref 7–25)
CALCIUM SERPL-MCNC: 10.6 MG/DL (ref 8.6–10.5)
CHLORIDE SERPL-SCNC: 96 MMOL/L (ref 98–107)
CHOLEST SERPL-MCNC: 228 MG/DL (ref 0–200)
CO2 SERPL-SCNC: 27.9 MMOL/L (ref 22–29)
CREAT SERPL-MCNC: 0.82 MG/DL (ref 0.57–1)
CREAT UR-MCNC: 41.7 MG/DL
GLOBULIN SER CALC-MCNC: 2.4 GM/DL
GLUCOSE SERPL-MCNC: 95 MG/DL (ref 65–99)
HBA1C MFR BLD: 7.1 % (ref 4.8–5.6)
HDLC SERPL-MCNC: 45 MG/DL (ref 40–60)
LDLC SERPL CALC-MCNC: 158 MG/DL (ref 0–100)
MICROALBUMIN UR-MCNC: <3 UG/ML
POTASSIUM SERPL-SCNC: 4.7 MMOL/L (ref 3.5–5.2)
PROT SERPL-MCNC: 7.3 G/DL (ref 6–8.5)
SODIUM SERPL-SCNC: 136 MMOL/L (ref 136–145)
TRIGL SERPL-MCNC: 136 MG/DL (ref 0–150)
TSH SERPL DL<=0.005 MIU/L-ACNC: 0.45 UIU/ML (ref 0.27–4.2)
VLDLC SERPL CALC-MCNC: 25 MG/DL (ref 5–40)

## 2021-05-05 DIAGNOSIS — F41.9 ANXIETY: ICD-10-CM

## 2021-05-05 RX ORDER — FLUOXETINE HYDROCHLORIDE 20 MG/1
CAPSULE ORAL
Qty: 30 CAPSULE | Refills: 0 | Status: SHIPPED | OUTPATIENT
Start: 2021-05-05 | End: 2021-06-04

## 2021-05-17 DIAGNOSIS — E11.9 TYPE 2 DIABETES MELLITUS WITHOUT COMPLICATION, WITHOUT LONG-TERM CURRENT USE OF INSULIN (HCC): ICD-10-CM

## 2021-05-17 DIAGNOSIS — I10 ESSENTIAL (PRIMARY) HYPERTENSION: ICD-10-CM

## 2021-05-18 RX ORDER — GLIPIZIDE 5 MG/1
TABLET, FILM COATED, EXTENDED RELEASE ORAL
Qty: 90 TABLET | Refills: 3 | Status: SHIPPED | OUTPATIENT
Start: 2021-05-18 | End: 2022-05-16

## 2021-05-18 RX ORDER — LISINOPRIL 20 MG/1
TABLET ORAL
Qty: 90 TABLET | Refills: 3 | Status: SHIPPED | OUTPATIENT
Start: 2021-05-18 | End: 2022-07-12

## 2021-06-04 DIAGNOSIS — F41.9 ANXIETY: ICD-10-CM

## 2021-06-04 RX ORDER — FLUOXETINE HYDROCHLORIDE 20 MG/1
CAPSULE ORAL
Qty: 30 CAPSULE | Refills: 0 | Status: SHIPPED | OUTPATIENT
Start: 2021-06-04 | End: 2021-07-12

## 2021-07-09 DIAGNOSIS — F41.9 ANXIETY: ICD-10-CM

## 2021-07-12 RX ORDER — FLUOXETINE HYDROCHLORIDE 20 MG/1
CAPSULE ORAL
Qty: 30 CAPSULE | Refills: 0 | Status: SHIPPED | OUTPATIENT
Start: 2021-07-12 | End: 2021-08-05

## 2021-08-05 DIAGNOSIS — F41.9 ANXIETY: ICD-10-CM

## 2021-08-05 RX ORDER — FLUOXETINE HYDROCHLORIDE 20 MG/1
CAPSULE ORAL
Qty: 30 CAPSULE | Refills: 0 | Status: SHIPPED | OUTPATIENT
Start: 2021-08-05 | End: 2021-09-02

## 2021-08-05 NOTE — TELEPHONE ENCOUNTER
Ov 4/2021   Pts mother verbalized understanding of dc instructions and follow up. Copy of covid results given to mother. Pt VSS, NAD< ABCs intact, exited ED ambulatory with steady gait holding mothers hand

## 2021-09-02 DIAGNOSIS — F41.9 ANXIETY: ICD-10-CM

## 2021-09-02 RX ORDER — FLUOXETINE HYDROCHLORIDE 20 MG/1
CAPSULE ORAL
Qty: 30 CAPSULE | Refills: 6 | Status: SHIPPED | OUTPATIENT
Start: 2021-09-02 | End: 2022-04-07

## 2021-09-29 DIAGNOSIS — E08.43 DIABETES MELLITUS DUE TO UNDERLYING CONDITION WITH DIABETIC AUTONOMIC NEUROPATHY, WITHOUT LONG-TERM CURRENT USE OF INSULIN (HCC): ICD-10-CM

## 2021-09-30 RX ORDER — SITAGLIPTIN 100 MG/1
TABLET, FILM COATED ORAL
Qty: 90 TABLET | Refills: 3 | Status: SHIPPED | OUTPATIENT
Start: 2021-09-30 | End: 2022-07-28 | Stop reason: SDUPTHER

## 2021-10-14 DIAGNOSIS — E03.8 OTHER SPECIFIED HYPOTHYROIDISM: ICD-10-CM

## 2021-10-14 RX ORDER — LEVOTHYROXINE SODIUM 137 UG/1
TABLET ORAL
Qty: 30 TABLET | Refills: 5 | Status: SHIPPED | OUTPATIENT
Start: 2021-10-14 | End: 2022-04-20

## 2021-10-25 ENCOUNTER — OFFICE VISIT (OUTPATIENT)
Dept: FAMILY MEDICINE CLINIC | Facility: CLINIC | Age: 66
End: 2021-10-25

## 2021-10-25 VITALS
DIASTOLIC BLOOD PRESSURE: 75 MMHG | OXYGEN SATURATION: 97 % | BODY MASS INDEX: 34.66 KG/M2 | WEIGHT: 208 LBS | HEIGHT: 65 IN | HEART RATE: 97 BPM | SYSTOLIC BLOOD PRESSURE: 138 MMHG

## 2021-10-25 DIAGNOSIS — E03.8 OTHER SPECIFIED HYPOTHYROIDISM: ICD-10-CM

## 2021-10-25 DIAGNOSIS — I10 ESSENTIAL (PRIMARY) HYPERTENSION: ICD-10-CM

## 2021-10-25 DIAGNOSIS — E78.2 MIXED HYPERLIPIDEMIA: ICD-10-CM

## 2021-10-25 DIAGNOSIS — E08.43 DIABETES MELLITUS DUE TO UNDERLYING CONDITION WITH DIABETIC AUTONOMIC NEUROPATHY, WITHOUT LONG-TERM CURRENT USE OF INSULIN (HCC): Primary | ICD-10-CM

## 2021-10-25 PROCEDURE — 99214 OFFICE O/P EST MOD 30 MIN: CPT | Performed by: FAMILY MEDICINE

## 2021-10-25 NOTE — PATIENT INSTRUCTIONS

## 2021-10-25 NOTE — PROGRESS NOTES
Subjective   Jennie Lofton is a 66 y.o. female. Presents today for   Chief Complaint   Patient presents with   • Diabetes   • Hypothyroidism   • Hypertension   • Hyperlipidemia       Diabetes  She presents for her follow-up diabetic visit. She has type 2 diabetes mellitus. Her disease course has been stable. Pertinent negatives for diabetes include no chest pain, no foot paresthesias and no foot ulcerations. Symptoms are stable.   Hypothyroidism  This is a chronic problem. The current episode started more than 1 year ago. The problem occurs constantly. The problem has been unchanged. Pertinent negatives include no chest pain. Treatments tried: levothyroxine.   Hypertension  This is a chronic problem. The current episode started more than 1 year ago. The problem is unchanged. The problem is controlled. Pertinent negatives include no chest pain, orthopnea, palpitations, peripheral edema, PND or shortness of breath. The current treatment provides moderate improvement.   Hyperlipidemia  This is a chronic problem. The current episode started more than 1 year ago. The problem is uncontrolled. Recent lipid tests were reviewed and are high. Exacerbating diseases include diabetes, hypothyroidism and obesity. Pertinent negatives include no chest pain or shortness of breath. Current antihyperlipidemic treatment includes statins (started statin last ov). The current treatment provides moderate improvement of lipids. Risk factors for coronary artery disease include dyslipidemia, diabetes mellitus, hypertension, post-menopausal and obesity.      has brain tumores, nees surgery possibly and has to care for;  Cannot feed self;  Mom with dementia and caring for as well.  Review of Systems   Respiratory: Negative for shortness of breath.    Cardiovascular: Negative for chest pain, palpitations, orthopnea and PND.       Patient Active Problem List   Diagnosis   • Intractable vomiting with nausea   • Lactic acidosis   •  "Diabetes mellitus due to underlying condition with diabetic autonomic neuropathy, without long-term current use of insulin ([facility])   • Hyponatremia   • Hypothyroid   • Essential (primary) hypertension   • Anxiety   • Breast mass   • Breast pain   • Luetscher's syndrome   • DM II (diabetes mellitus, type II), controlled ([facility])   • Encounter for screening colonoscopy   • History of breast cancer   • Syncope   • UTI (urinary tract infection)   • Arthritis of right hip   • Status post right hip replacement   • Right sided sciatica   • Chronic SI joint pain       Social History     Socioeconomic History   • Marital status:    Tobacco Use   • Smoking status: Former Smoker     Packs/day: 1.00     Years: 4.00     Pack years: 4.00     Start date: 1973     Quit date: 1977     Years since quittin.1   • Smokeless tobacco: Never Used   • Tobacco comment: college years [id number]   Substance and Sexual Activity   • Alcohol use: Yes     Alcohol/week: 2.0 standard drinks     Types: 1 Glasses of wine, 1 Cans of beer per week     Comment: social/ wine/beer occasionally   • Drug use: No   • Sexual activity: Not Currently     Partners: Male     Birth control/protection: None       Allergies   Allergen Reactions   • Metformin And Related GI Intolerance     Admitted x2 with lactic acidosis   • Codeine Anxiety, Dizziness and Nausea Only   • Letrozole Rash   • Naproxen Anxiety and Dizziness   • Other Rash     METAL ALLERGY  \"PT STATES MIGHT BE NICKEL JUST NOT SURE\".       Current Outpatient Medications on File Prior to Visit   Medication Sig Dispense Refill   • Accu-Chek Softclix Lancets lancets USE ONE LANCET TO TEST DAILY 100 each 11   • ALPRAZolam (XANAX) 0.25 MG tablet TAKE ONE TABLET BY MOUTH TWICE A DAY AS NEEDED FOR ANXIETY 15 tablet 0   • Cholecalciferol (VITAMIN D3) 125 MCG (5000 UT) capsule capsule TAKE ONE CAPSULE BY MOUTH DAILY 90 capsule 0   • FLUoxetine (PROzac) 20 MG capsule TAKE ONE CAPSULE BY MOUTH DAILY 30 " "capsule 6   • glipizide (GLUCOTROL XL) 5 MG ER tablet TAKE ONE TABLET BY MOUTH DAILY 90 tablet 3   • glucose blood (Accu-Chek Maddison Plus) test strip 1 each by Other route Daily. USE ONE STRIP TO TEST DAILY 100 each 3   • Januvia 100 MG tablet TAKE 1 TABLET DAILY 90 tablet 3   • levothyroxine (SYNTHROID, LEVOTHROID) 137 MCG tablet TAKE ONE TABLET BY MOUTH DAILY 30 tablet 5   • lisinopril (PRINIVIL,ZESTRIL) 20 MG tablet TAKE ONE TABLET BY MOUTH DAILY 90 tablet 3   • Multiple Vitamins-Minerals (PRESERVISION AREDS 2 PO) Take  by mouth.     • rosuvastatin (Crestor) 10 MG tablet Take 1 tablet by mouth Every Night. 30 tablet 5   • [DISCONTINUED] FLUoxetine (PROzac) 20 MG capsule TAKE ONE CAPSULE BY MOUTH DAILY 30 capsule 0     No current facility-administered medications on file prior to visit.       Objective   Vitals:    10/25/21 1049   BP: 142/70   Pulse: 97   SpO2: 97%   Weight: 94.3 kg (208 lb)   Height: 165.1 cm (65\")     Body mass index is 34.61 kg/m².    Physical Exam  Vitals and nursing note reviewed.   Constitutional:       Appearance: She is well-developed.   HENT:      Head: Normocephalic and atraumatic.   Neck:      Thyroid: No thyromegaly.      Vascular: No JVD.   Cardiovascular:      Rate and Rhythm: Normal rate and regular rhythm.      Heart sounds: Normal heart sounds. No murmur heard.  No friction rub. No gallop.    Pulmonary:      Effort: Pulmonary effort is normal. No respiratory distress.      Breath sounds: Normal breath sounds. No wheezing or rales.   Abdominal:      General: Bowel sounds are normal. There is no distension.      Palpations: Abdomen is soft.      Tenderness: There is no abdominal tenderness. There is no guarding or rebound.   Musculoskeletal:      Cervical back: Neck supple.   Skin:     General: Skin is warm and dry.   Neurological:      Mental Status: She is alert.   Psychiatric:         Behavior: Behavior normal.         Assessment/Plan   Diagnoses and all orders for this " visit:    1. Diabetes mellitus due to underlying condition with diabetic autonomic neuropathy, without long-term current use of insulin ([facility]) (Primary)  -     Comprehensive Metabolic Panel  -     Lipid Panel  -     Hemoglobin A1c    2. Other specified hypothyroidism  -     TSH    3. Essential (primary) hypertension  -     Comprehensive Metabolic Panel    4. Mixed hyperlipidemia  -     Comprehensive Metabolic Panel  -     Lipid Panel    Has a lot of caretaker stress;  encoruaged to continue and stay active like can  -dm2 - continue medications, recheck labs  -hypertension - controlled, continue medications  -HLD - continue statin, recheck lipids.  -hypothyroidism - continue medication, recheck thyroid labs.           -Follow up: 6 months and prn

## 2021-10-26 LAB
ALBUMIN SERPL-MCNC: 4.4 G/DL (ref 3.8–4.8)
ALBUMIN/GLOB SERPL: 1.8 {RATIO} (ref 1.2–2.2)
ALP SERPL-CCNC: 69 IU/L (ref 44–121)
ALT SERPL-CCNC: 10 IU/L (ref 0–32)
AST SERPL-CCNC: 13 IU/L (ref 0–40)
BILIRUB SERPL-MCNC: 0.4 MG/DL (ref 0–1.2)
BUN SERPL-MCNC: 15 MG/DL (ref 8–27)
BUN/CREAT SERPL: 19 (ref 12–28)
CALCIUM SERPL-MCNC: 9.8 MG/DL (ref 8.7–10.3)
CHLORIDE SERPL-SCNC: 101 MMOL/L (ref 96–106)
CHOLEST SERPL-MCNC: 261 MG/DL (ref 100–199)
CO2 SERPL-SCNC: 22 MMOL/L (ref 20–29)
CREAT SERPL-MCNC: 0.78 MG/DL (ref 0.57–1)
GLOBULIN SER CALC-MCNC: 2.5 G/DL (ref 1.5–4.5)
GLUCOSE SERPL-MCNC: 157 MG/DL (ref 65–99)
HBA1C MFR BLD: 7.3 % (ref 4.8–5.6)
HDLC SERPL-MCNC: 55 MG/DL
LDLC SERPL CALC-MCNC: 163 MG/DL (ref 0–99)
POTASSIUM SERPL-SCNC: 4.9 MMOL/L (ref 3.5–5.2)
PROT SERPL-MCNC: 6.9 G/DL (ref 6–8.5)
SODIUM SERPL-SCNC: 136 MMOL/L (ref 134–144)
TRIGL SERPL-MCNC: 231 MG/DL (ref 0–149)
TSH SERPL DL<=0.005 MIU/L-ACNC: 0.58 UIU/ML (ref 0.45–4.5)
VLDLC SERPL CALC-MCNC: 43 MG/DL (ref 5–40)

## 2021-10-31 NOTE — PROGRESS NOTES
Diabetes is not quite goal, work on diet and exercise  Cholesterol is inadequately controlled.  Increase rosuvastatin to 20mg po daily  Thyroid appropriate range  Rest of labs normal or stable.

## 2021-11-14 ENCOUNTER — TELEPHONE (OUTPATIENT)
Dept: URGENT CARE | Facility: CLINIC | Age: 66
End: 2021-11-14

## 2021-11-15 DIAGNOSIS — E78.2 MIXED HYPERLIPIDEMIA: Primary | ICD-10-CM

## 2021-11-15 RX ORDER — EZETIMIBE 10 MG/1
10 TABLET ORAL DAILY
Qty: 30 TABLET | Refills: 5 | Status: SHIPPED | OUTPATIENT
Start: 2021-11-15 | End: 2023-03-14

## 2021-11-15 NOTE — TELEPHONE ENCOUNTER
Could try zetia 10mg po daily or livalo 2mg po daily (less likely statin to cause side effects).    ----- Message from Madelyn Pyle MA sent at 11/2/2021 10:09 AM EDT -----  Patient states she took crestor for 1 month and could not tolerate due to severe joint aches.her mom has the same issue with statins. She is willing to try a cholesterol medication if it is not a statin drug.

## 2022-02-02 DIAGNOSIS — F41.9 ANXIETY: ICD-10-CM

## 2022-02-02 RX ORDER — ALPRAZOLAM 0.25 MG/1
TABLET ORAL
Qty: 15 TABLET | Refills: 2 | Status: SHIPPED | OUTPATIENT
Start: 2022-02-02

## 2022-04-07 DIAGNOSIS — F41.9 ANXIETY: ICD-10-CM

## 2022-04-07 RX ORDER — FLUOXETINE HYDROCHLORIDE 20 MG/1
CAPSULE ORAL
Qty: 30 CAPSULE | Refills: 6 | Status: SHIPPED | OUTPATIENT
Start: 2022-04-07 | End: 2022-11-11

## 2022-04-20 DIAGNOSIS — E03.8 OTHER SPECIFIED HYPOTHYROIDISM: ICD-10-CM

## 2022-04-20 RX ORDER — LEVOTHYROXINE SODIUM 137 UG/1
TABLET ORAL
Qty: 30 TABLET | Refills: 5 | Status: SHIPPED | OUTPATIENT
Start: 2022-04-20 | End: 2022-10-11

## 2022-05-14 DIAGNOSIS — E11.9 TYPE 2 DIABETES MELLITUS WITHOUT COMPLICATION, WITHOUT LONG-TERM CURRENT USE OF INSULIN: ICD-10-CM

## 2022-05-16 RX ORDER — GLIPIZIDE 5 MG/1
TABLET, FILM COATED, EXTENDED RELEASE ORAL
Qty: 90 TABLET | Refills: 3 | Status: SHIPPED | OUTPATIENT
Start: 2022-05-16 | End: 2022-07-28 | Stop reason: SDUPTHER

## 2022-07-08 RX ORDER — BLOOD SUGAR DIAGNOSTIC
STRIP MISCELLANEOUS
Qty: 50 EACH | Refills: 3 | Status: SHIPPED | OUTPATIENT
Start: 2022-07-08

## 2022-07-11 DIAGNOSIS — E11.9 TYPE 2 DIABETES MELLITUS WITHOUT COMPLICATION, WITHOUT LONG-TERM CURRENT USE OF INSULIN: ICD-10-CM

## 2022-07-11 DIAGNOSIS — I10 ESSENTIAL (PRIMARY) HYPERTENSION: ICD-10-CM

## 2022-07-12 RX ORDER — LISINOPRIL 20 MG/1
TABLET ORAL
Qty: 90 TABLET | Refills: 3 | Status: SHIPPED | OUTPATIENT
Start: 2022-07-12 | End: 2022-07-14

## 2022-07-14 DIAGNOSIS — I10 ESSENTIAL (PRIMARY) HYPERTENSION: ICD-10-CM

## 2022-07-14 DIAGNOSIS — E11.9 TYPE 2 DIABETES MELLITUS WITHOUT COMPLICATION, WITHOUT LONG-TERM CURRENT USE OF INSULIN: ICD-10-CM

## 2022-07-14 RX ORDER — LISINOPRIL 20 MG/1
TABLET ORAL
Qty: 90 TABLET | Refills: 3 | Status: SHIPPED | OUTPATIENT
Start: 2022-07-14

## 2022-07-18 ENCOUNTER — TELEPHONE (OUTPATIENT)
Dept: ORTHOPEDIC SURGERY | Facility: CLINIC | Age: 67
End: 2022-07-18

## 2022-07-18 NOTE — TELEPHONE ENCOUNTER
She no longer needs antibiotic prophylaxis. She only needed it for 2 years after her replacement surgery.

## 2022-07-18 NOTE — TELEPHONE ENCOUNTER
Caller: MARCI    Relationship to patient: SELF    Best call back number: [phone]    Patient is needing: PATIENT HAVING TOOTH PULLED AND NEEDS ANTIBIOTIC TO FEROZ CONTEH

## 2022-07-28 ENCOUNTER — OFFICE VISIT (OUTPATIENT)
Dept: FAMILY MEDICINE CLINIC | Facility: CLINIC | Age: 67
End: 2022-07-28

## 2022-07-28 VITALS
WEIGHT: 200 LBS | SYSTOLIC BLOOD PRESSURE: 120 MMHG | OXYGEN SATURATION: 97 % | HEIGHT: 63 IN | DIASTOLIC BLOOD PRESSURE: 62 MMHG | BODY MASS INDEX: 35.44 KG/M2 | HEART RATE: 84 BPM

## 2022-07-28 DIAGNOSIS — Z00.00 MEDICARE ANNUAL WELLNESS VISIT, SUBSEQUENT: Primary | ICD-10-CM

## 2022-07-28 DIAGNOSIS — E78.2 MIXED HYPERLIPIDEMIA: ICD-10-CM

## 2022-07-28 DIAGNOSIS — E66.01 MORBID (SEVERE) OBESITY DUE TO EXCESS CALORIES: ICD-10-CM

## 2022-07-28 DIAGNOSIS — Z12.31 ENCOUNTER FOR SCREENING MAMMOGRAM FOR MALIGNANT NEOPLASM OF BREAST: ICD-10-CM

## 2022-07-28 DIAGNOSIS — E08.42 DIABETES MELLITUS DUE TO UNDERLYING CONDITION WITH DIABETIC POLYNEUROPATHY, WITHOUT LONG-TERM CURRENT USE OF INSULIN: ICD-10-CM

## 2022-07-28 DIAGNOSIS — Z78.0 MENOPAUSE: ICD-10-CM

## 2022-07-28 DIAGNOSIS — Z12.12 ENCOUNTER FOR COLORECTAL CANCER SCREENING: ICD-10-CM

## 2022-07-28 DIAGNOSIS — E11.65 TYPE 2 DIABETES MELLITUS WITH HYPERGLYCEMIA, WITHOUT LONG-TERM CURRENT USE OF INSULIN: ICD-10-CM

## 2022-07-28 DIAGNOSIS — E55.9 VITAMIN D DEFICIENCY: ICD-10-CM

## 2022-07-28 DIAGNOSIS — E03.8 OTHER SPECIFIED HYPOTHYROIDISM: ICD-10-CM

## 2022-07-28 DIAGNOSIS — E11.9 ENCOUNTER FOR DIABETIC FOOT EXAM: ICD-10-CM

## 2022-07-28 DIAGNOSIS — Z12.11 ENCOUNTER FOR COLORECTAL CANCER SCREENING: ICD-10-CM

## 2022-07-28 DIAGNOSIS — R35.0 URINARY FREQUENCY: ICD-10-CM

## 2022-07-28 DIAGNOSIS — I10 ESSENTIAL (PRIMARY) HYPERTENSION: ICD-10-CM

## 2022-07-28 PROCEDURE — G0439 PPPS, SUBSEQ VISIT: HCPCS | Performed by: FAMILY MEDICINE

## 2022-07-28 PROCEDURE — 1126F AMNT PAIN NOTED NONE PRSNT: CPT | Performed by: FAMILY MEDICINE

## 2022-07-28 PROCEDURE — 1170F FXNL STATUS ASSESSED: CPT | Performed by: FAMILY MEDICINE

## 2022-07-28 PROCEDURE — 1159F MED LIST DOCD IN RCRD: CPT | Performed by: FAMILY MEDICINE

## 2022-07-28 PROCEDURE — 99214 OFFICE O/P EST MOD 30 MIN: CPT | Performed by: FAMILY MEDICINE

## 2022-07-28 RX ORDER — GLIPIZIDE 5 MG/1
10 TABLET, FILM COATED, EXTENDED RELEASE ORAL DAILY
Qty: 180 TABLET | Refills: 3 | Status: SHIPPED | OUTPATIENT
Start: 2022-07-28

## 2022-07-28 RX ORDER — LANCETS
EACH MISCELLANEOUS
Qty: 100 EACH | Refills: 11 | Status: SHIPPED | OUTPATIENT
Start: 2022-07-28

## 2022-07-29 LAB
25(OH)D3+25(OH)D2 SERPL-MCNC: 50.6 NG/ML (ref 30–100)
ALBUMIN SERPL-MCNC: 4.2 G/DL (ref 3.8–4.8)
ALBUMIN/CREAT UR: 5 MG/G CREAT (ref 0–29)
ALBUMIN/GLOB SERPL: 1.8 {RATIO} (ref 1.2–2.2)
ALP SERPL-CCNC: 82 IU/L (ref 44–121)
ALT SERPL-CCNC: 23 IU/L (ref 0–32)
APPEARANCE UR: CLEAR
AST SERPL-CCNC: 17 IU/L (ref 0–40)
BACTERIA #/AREA URNS HPF: NORMAL /[HPF]
BILIRUB SERPL-MCNC: 0.3 MG/DL (ref 0–1.2)
BILIRUB UR QL STRIP: NEGATIVE
BUN SERPL-MCNC: 9 MG/DL (ref 8–27)
BUN/CREAT SERPL: 11 (ref 12–28)
CALCIUM SERPL-MCNC: 9.3 MG/DL (ref 8.7–10.3)
CASTS URNS QL MICRO: NORMAL /LPF
CHLORIDE SERPL-SCNC: 96 MMOL/L (ref 96–106)
CHOLEST SERPL-MCNC: 210 MG/DL (ref 100–199)
CO2 SERPL-SCNC: 26 MMOL/L (ref 20–29)
COLOR UR: YELLOW
CREAT SERPL-MCNC: 0.83 MG/DL (ref 0.57–1)
CREAT UR-MCNC: 96.7 MG/DL
EGFRCR SERPLBLD CKD-EPI 2021: 77 ML/MIN/1.73
EPI CELLS #/AREA URNS HPF: NORMAL /HPF (ref 0–10)
GLOBULIN SER CALC-MCNC: 2.4 G/DL (ref 1.5–4.5)
GLUCOSE SERPL-MCNC: 130 MG/DL (ref 65–99)
GLUCOSE UR QL STRIP: NEGATIVE
HBA1C MFR BLD: 8 % (ref 4.8–5.6)
HDLC SERPL-MCNC: 36 MG/DL
HGB UR QL STRIP: NEGATIVE
KETONES UR QL STRIP: NEGATIVE
LDLC SERPL CALC-MCNC: 101 MG/DL (ref 0–99)
LEUKOCYTE ESTERASE UR QL STRIP: ABNORMAL
MICRO URNS: ABNORMAL
MICROALBUMIN UR-MCNC: 5 UG/ML
NITRITE UR QL STRIP: NEGATIVE
PH UR STRIP: 6 [PH] (ref 5–7.5)
POTASSIUM SERPL-SCNC: 4.5 MMOL/L (ref 3.5–5.2)
PROT SERPL-MCNC: 6.6 G/DL (ref 6–8.5)
PROT UR QL STRIP: NEGATIVE
RBC #/AREA URNS HPF: NORMAL /HPF (ref 0–2)
SODIUM SERPL-SCNC: 136 MMOL/L (ref 134–144)
SP GR UR STRIP: 1.01 (ref 1–1.03)
TRIGL SERPL-MCNC: 431 MG/DL (ref 0–149)
TSH SERPL DL<=0.005 MIU/L-ACNC: 0.91 UIU/ML (ref 0.45–4.5)
UROBILINOGEN UR STRIP-MCNC: 0.2 MG/DL (ref 0.2–1)
VLDLC SERPL CALC-MCNC: 73 MG/DL (ref 5–40)
WBC #/AREA URNS HPF: NORMAL /HPF (ref 0–5)

## 2022-08-07 NOTE — PROGRESS NOTES
Diabetes is inadequately controlled.  Increase glipizide as discussed  Cholesterol is inadequately controlled.  What statins has she tried?  Rest of labs normal or stable.

## 2022-08-29 ENCOUNTER — TELEPHONE (OUTPATIENT)
Dept: FAMILY MEDICINE CLINIC | Facility: CLINIC | Age: 67
End: 2022-08-29

## 2022-08-29 NOTE — TELEPHONE ENCOUNTER
Caller: NATAN    Relationship to patient: [facility]    Best call back number: [phone]    Patient is needing: SHE NEEDS TO KNOW THE MOST RECENT BLOOD PRESSURE AND DATE AND SHE NEEDS TO VERIFY THAT YOU ORDERED HER A COLOGUARD.  SHE ADVISED THAT THE ABOVE LINE IS SECURE AND IS OK TO LEAVE A VOICE MESSAGE ON.

## 2022-09-04 LAB — NONINV COLON CA DNA+OCC BLD SCRN STL QL: POSITIVE

## 2022-09-06 DIAGNOSIS — R19.5 POSITIVE COLORECTAL CANCER SCREENING USING COLOGUARD TEST: Primary | ICD-10-CM

## 2022-09-12 NOTE — TELEPHONE ENCOUNTER
OV: 10/21/21  UDS & NA: Both Need Done   ONLY 3x10 2.5#   Cues to keep in pain free range   Increased wt after 1st set 8/17 no weight d/t inc in discomfort   PPT with hands on bottom 15x5''  x    BKFO  2x10 ea  Blue  x    Bridges + ABD 2x10 Blue x    Dead bugs  20x      Prone swimmers  20x   Opposite arm and leg    Cat/camel  20x  x    Law pose  3x30\"   x    Prone press ups 2x10  Hold 5s x To stretch hip flexor   Prone hip flexor stretch 3x30\" Strap and towel under knee     Seated pelvic tilts  x20             Standing:       QL hip hike 2x10 ea      3 way hip 15x2 ea green x  Cues for core activation and posture    Straight arm pull downs  2x20  Blue  Unilateral; progressed    Palloff press  2x15 ea blue x    90 deg resisted rotations  2x10 ea  Blue  x    Palloff walkout 10x ea Blue     Marching with SLS 2x10 2.5#  3'' SLS with each march    Education  10'   Educated on sitting with a more anterior pelvic tilt. Discussed using a pillow at the lumbar spine to promote this when driving. Pt sits like this for >5 minutes in session and notes no increase of symptoms   Educated on the need to work through more extension based movement as this relieves symptoms and flexion increases symptoms. Pain post session    0/10    Other:  9/12: provided with HEP booklet to try to progress exercises and stretches on own. Provided a detailed handout of cat/camel as that one pt needs most cues on.   8/22: JOSE OCONNELL 6 pt -- 30% functional limitation (previously 8pt = 35% functional limitation) // hip strength: 5/5 bilaterally   8/8-- provided with a new HEP    Specific Instructions for next treatment: re-assess if pt returns. IF does not return will close this episode of care     HEP:  Access Code: [email or web address]  URL: [email or web address]  Date: 07/27/2022  Prepared by: Cherie Orr    Exercises  Hip Hiking on Step - 1 x daily - 7 x weekly - 3 sets - 10 reps  Standing Hip Flexion with Resistance Loop - 1 x daily - 7 x weekly - 3 sets - 10 reps  Hip Extension with Resistance Loop - 1 x daily - 7 x weekly - 3 sets - 10 reps  Standing Hip Abduction Kicks - 1 x daily - 7 x weekly - 3 sets - 10 reps  Modified Sebastián Stretch - 1 x daily - 7 x weekly - 3 sets - 1 min hold        Assessment: [x] Progressing toward goals. Began session focused on mat level stretches and exercises to improve trunk mobility and strengthening. Pt noting overall improved pain levels that are not interfering with his work activities as much. Does still get some symptoms with prolonged sitting but it is not as intense or lasting. With session focused on exercises he could do as part of home program. Most cueing needed for correct cat/camel form so a detailed handout given to patient. By end of treatment pt having very little pain and feeling better. Continued to emphasize the need for movement and doing HEP. Provided with lumbar stretching and stabilization booklet as pt expresses interest in wanting to try more exercises. Discussed progressions with pt noting understanding. Pt noting post session he would like to try and continue with exercises on his own. He wishes to keep POC open in the event the pain worsens or if he has further questions. Feel this is appropriate at  this time for him to trial management of symptoms with HEP due to low pain and good response with programming. Will leave this episode of care open for 1 mo. If do not hear from patient in one month this will serve as discharge note and all goals will be updated based on pt's pain at this time.          GOALS: - updated 9/12/22  STG/LTG: (to be met in 10-15 treatments)  Pt will self report worst pain no greater than 2/10 upon arrival to sessions in order to better tolerate ADLs/work activities with minimal dysfunction   - 8/22: progressing pain -- 3-4/10 upon arriving to sessions compared to 5/10 he had at [facility]. He also gets relief in sessions    - 9/12: progressing -- pain about 3/10 but resolves in sessions   Pt will report an improved sitting tolerance to >30 minutes before getting symptoms to allow him to be able to complete work duties. -8/22: progressing -- has radiating pain with sitting > 30 minutes    -9/12: progressing -- still getting symptoms but they are not as intense and not as long lasting   Pt will have improved tolerance to laying flat and on his side with no pain to allow him to have a good night's sleep.   -8/22: MET -- no pain with side lying, more when on his back   Pt will increase L hip strength to 5/5 in all major muscle groups to ensure equal strength to R side and to ensure appropriate hip/pelvic stability with mobility. - 8/22: MET   Pt will improve score on HOOS JR to less than 25% functional limitation to note improved control of hip symptoms with functional activities. -8/22: Progressing -- 5% improvement to 30% functional limitation   -9/12: assume this is MET as pt no longer having hip pain   Pt will be independent with home program addressing strength, flexibility and balance to maximize gains made in therapy to improve overall functional capacity for mobility.     -8/22: progressing -- doing exercises intermittently and has not been doing foam roll consistently.    -9/12: provided with lumbar booklet will update if patient returns with questions   7. NEW GOAL 8/22: Pt will demonstrate improved postural awareness with only minimal cues to correct posture throughout session to prevent undue stress to spine and causing LLE pain.    -9/12: MET demos in sessions       Pt. Education:  [x] Yes  [] No  [x] Reviewed Prior HEP/Ed  Method of Education: [x] Verbal  [x] Demo  [x] Written  Comprehension of Education:  [x] Verbalizes understanding. [] Demonstrates understanding. [] Needs review. [x] Demonstrates/verbalizes HEP/Ed previously given. Plan: [] Continue per plan of care. [x] Other: hold POC -- pt to call and schedule appt if he has further questions in regards to POC.           Treatment Charges: Mins

## 2022-09-28 ENCOUNTER — TELEPHONE (OUTPATIENT)
Dept: FAMILY MEDICINE CLINIC | Facility: CLINIC | Age: 67
End: 2022-09-28

## 2022-09-28 NOTE — TELEPHONE ENCOUNTER
Caller: Jennie Lofton    Relationship: Self    Best call back number: [phone]    What is the best time to reach you: ANY    Who are you requesting to speak with (clinical staff, provider,  specific staff member): ANY    What was the call regarding: PATIENT STATES SHE'S SUPPOSED TO GET A COLONOSCOPY AND HAS NOT HEARD ANYTHING ABOUT AN APPOINTMENT. PLEASE CALL PATIENT TO DISCUSS    Do you require a callback: YES

## 2022-10-11 DIAGNOSIS — E03.8 OTHER SPECIFIED HYPOTHYROIDISM: ICD-10-CM

## 2022-10-11 RX ORDER — LEVOTHYROXINE SODIUM 137 UG/1
TABLET ORAL
Qty: 30 TABLET | Refills: 5 | Status: SHIPPED | OUTPATIENT
Start: 2022-10-11

## 2022-10-21 ENCOUNTER — PREP FOR SURGERY (OUTPATIENT)
Dept: SURGERY | Facility: SURGERY CENTER | Age: 67
End: 2022-10-21

## 2022-10-21 DIAGNOSIS — Z12.11 ENCOUNTER FOR SCREENING FOR MALIGNANT NEOPLASM OF COLON: Primary | ICD-10-CM

## 2022-10-21 RX ORDER — SODIUM CHLORIDE, SODIUM LACTATE, POTASSIUM CHLORIDE, CALCIUM CHLORIDE 600; 310; 30; 20 MG/100ML; MG/100ML; MG/100ML; MG/100ML
30 INJECTION, SOLUTION INTRAVENOUS CONTINUOUS PRN
Status: CANCELLED | OUTPATIENT
Start: 2022-10-21

## 2022-10-26 ENCOUNTER — TELEPHONE (OUTPATIENT)
Dept: FAMILY MEDICINE CLINIC | Facility: CLINIC | Age: 67
End: 2022-10-26

## 2022-10-28 DIAGNOSIS — U07.1 COVID-19 VIRUS DETECTED: Primary | ICD-10-CM

## 2022-10-28 NOTE — TELEPHONE ENCOUNTER
Pt is doing okay. She still has some congestion, fatigue and cough. Pt's covid test was positive. [facility] is sending paxlovid and pt is aware

## 2022-11-11 DIAGNOSIS — F41.9 ANXIETY: ICD-10-CM

## 2022-11-11 RX ORDER — FLUOXETINE HYDROCHLORIDE 20 MG/1
CAPSULE ORAL
Qty: 30 CAPSULE | Refills: 6 | Status: SHIPPED | OUTPATIENT
Start: 2022-11-11 | End: 2023-03-14 | Stop reason: SDUPTHER

## 2023-02-01 ENCOUNTER — HOSPITAL ENCOUNTER (OUTPATIENT)
Facility: SURGERY CENTER | Age: 68
Setting detail: HOSPITAL OUTPATIENT SURGERY
Discharge: HOME | End: 2023-02-01
Attending: INTERNAL MEDICINE | Admitting: INTERNAL MEDICINE
Payer: MEDICARE

## 2023-02-01 ENCOUNTER — ANESTHESIA (OUTPATIENT)
Dept: SURGERY | Facility: SURGERY CENTER | Age: 68
End: 2023-02-01
Payer: MEDICARE

## 2023-02-01 ENCOUNTER — ANESTHESIA EVENT (OUTPATIENT)
Dept: SURGERY | Facility: SURGERY CENTER | Age: 68
End: 2023-02-01
Payer: MEDICARE

## 2023-02-01 VITALS
SYSTOLIC BLOOD PRESSURE: 114 MMHG | OXYGEN SATURATION: 97 % | TEMPERATURE: 98.4 F | WEIGHT: 199.4 LBS | HEIGHT: 67 IN | HEART RATE: 69 BPM | BODY MASS INDEX: 31.3 KG/M2 | RESPIRATION RATE: 16 BRPM | DIASTOLIC BLOOD PRESSURE: 63 MMHG

## 2023-02-01 DIAGNOSIS — Z12.11 ENCOUNTER FOR SCREENING FOR MALIGNANT NEOPLASM OF COLON: ICD-10-CM

## 2023-02-01 LAB — GLUCOSE BLDC GLUCOMTR-MCNC: 204 MG/DL (ref 70–130)

## 2023-02-01 PROCEDURE — 45385 COLONOSCOPY W/LESION REMOVAL: CPT | Performed by: INTERNAL MEDICINE

## 2023-02-01 PROCEDURE — 45380 COLONOSCOPY AND BIOPSY: CPT | Performed by: INTERNAL MEDICINE

## 2023-02-01 PROCEDURE — 25010000002 PROPOFOL 10 MG/ML EMULSION: Performed by: ANESTHESIOLOGY

## 2023-02-01 PROCEDURE — 88305 TISSUE EXAM BY PATHOLOGIST: CPT | Performed by: INTERNAL MEDICINE

## 2023-02-01 RX ORDER — ONDANSETRON 2 MG/ML
4 INJECTION INTRAMUSCULAR; INTRAVENOUS ONCE AS NEEDED
Status: DISCONTINUED | OUTPATIENT
Start: 2023-02-01 | End: 2023-02-01 | Stop reason: HOSPADM

## 2023-02-01 RX ORDER — SODIUM CHLORIDE, SODIUM LACTATE, POTASSIUM CHLORIDE, CALCIUM CHLORIDE 600; 310; 30; 20 MG/100ML; MG/100ML; MG/100ML; MG/100ML
30 INJECTION, SOLUTION INTRAVENOUS CONTINUOUS PRN
Status: DISCONTINUED | OUTPATIENT
Start: 2023-02-01 | End: 2023-02-01 | Stop reason: HOSPADM

## 2023-02-01 RX ORDER — SODIUM CHLORIDE 0.9 % (FLUSH) 0.9 %
10 SYRINGE (ML) INJECTION AS NEEDED
Status: DISCONTINUED | OUTPATIENT
Start: 2023-02-01 | End: 2023-02-01 | Stop reason: HOSPADM

## 2023-02-01 RX ORDER — LANOLIN ALCOHOL/MO/W.PET/CERES
2500 CREAM (GRAM) TOPICAL DAILY
COMMUNITY

## 2023-02-01 RX ORDER — SODIUM CHLORIDE 0.9 % (FLUSH) 0.9 %
3 SYRINGE (ML) INJECTION EVERY 12 HOURS SCHEDULED
Status: DISCONTINUED | OUTPATIENT
Start: 2023-02-01 | End: 2023-02-01 | Stop reason: HOSPADM

## 2023-02-01 RX ORDER — LIDOCAINE HYDROCHLORIDE 20 MG/ML
INJECTION, SOLUTION INFILTRATION; PERINEURAL AS NEEDED
Status: DISCONTINUED | OUTPATIENT
Start: 2023-02-01 | End: 2023-02-01 | Stop reason: SURG

## 2023-02-01 RX ORDER — PROPOFOL 10 MG/ML
VIAL (ML) INTRAVENOUS CONTINUOUS PRN
Status: DISCONTINUED | OUTPATIENT
Start: 2023-02-01 | End: 2023-02-01 | Stop reason: SURG

## 2023-02-01 RX ORDER — PROPOFOL 10 MG/ML
VIAL (ML) INTRAVENOUS AS NEEDED
Status: DISCONTINUED | OUTPATIENT
Start: 2023-02-01 | End: 2023-02-01 | Stop reason: SURG

## 2023-02-01 RX ADMIN — PROPOFOL 100 MG: 10 INJECTION, EMULSION INTRAVENOUS at 11:54

## 2023-02-01 RX ADMIN — LIDOCAINE HYDROCHLORIDE 50 MG: 20 INJECTION, SOLUTION INFILTRATION; PERINEURAL at 11:54

## 2023-02-01 RX ADMIN — PROPOFOL 20 MG: 10 INJECTION, EMULSION INTRAVENOUS at 11:58

## 2023-02-01 RX ADMIN — SODIUM CHLORIDE, POTASSIUM CHLORIDE, SODIUM LACTATE AND CALCIUM CHLORIDE 30 ML/HR: 600; 310; 30; 20 INJECTION, SOLUTION INTRAVENOUS at 11:10

## 2023-02-01 RX ADMIN — Medication 180 MCG/KG/MIN: at 11:54

## 2023-02-01 NOTE — ANESTHESIA POSTPROCEDURE EVALUATION
"Patient: Jennie Lofton    Procedure Summary     Date: 02/01/23 Room / Location: [facility] / [facility]    Anesthesia Start: 1149 Anesthesia Stop: 1220    Procedure: COLONOSCOPY FOR SCREENING with Polypectomy Diagnosis:       Encounter for screening for malignant neoplasm of colon      (Encounter for screening for malignant neoplasm of colon [Z12.11])    Surgeons: Los Nicolas MD Provider: Jo Villa MD    Anesthesia Type: MAC ASA Status: 2          Anesthesia Type: MAC    Vitals  Vitals Value Taken Time   /70 02/01/23 1218   Temp 36.9 °C (98.4 °F) 02/01/23 1218   Pulse 74 02/01/23 1218   Resp 16 02/01/23 1218   SpO2 97 % 02/01/23 1218           Post Anesthesia Care and Evaluation    Patient location during evaluation: PACU  Patient participation: complete - patient participated  Level of consciousness: awake  Pain management: adequate    Airway patency: patent  Anesthetic complications: No anesthetic complications    Cardiovascular status: acceptable  Respiratory status: acceptable  Hydration status: acceptable    Comments: /70 (BP Location: Left arm, Patient Position: Lying)   Pulse 74   Temp 36.9 °C (98.4 °F) (Infrared)   Resp 16   Ht 170.2 cm (67\")   Wt 90.4 kg (199 lb 6.4 oz)   SpO2 97%   BMI 31.23 kg/m²       "

## 2023-02-01 NOTE — H&P
No chief complaint on file.      HPI  Patient today for screening colonoscopy. She had a + cologuard         Problem List:    Patient Active Problem List   Diagnosis   • Intractable vomiting with nausea   • Lactic acidosis   • Diabetes mellitus due to underlying condition with diabetic autonomic neuropathy, without long-term current use of insulin ([facility])   • Hyponatremia   • Hypothyroid   • Essential (primary) hypertension   • Anxiety   • Breast mass   • Breast pain   • Luetscher's syndrome   • DM II (diabetes mellitus, type II), controlled ([facility])   • Encounter for screening colonoscopy   • History of breast cancer   • Syncope   • UTI (urinary tract infection)   • Arthritis of right hip   • Status post right hip replacement   • Right sided sciatica   • Chronic SI joint pain       Medical History:    Past Medical History:   Diagnosis Date   • Anxiety    • Arthritis    • Breast cancer ([facility]) 2012    HAD BREAST LUMPECTOMY   • Diabetes mellitus ([facility])    • Hypertension    • Hypothyroid    • Muscle spasm of right leg    • Neuropathy    • Right hip pain    • Urinary tract infection         Social History:    Social History     Socioeconomic History   • Marital status:    Tobacco Use   • Smoking status: Former     Packs/day: 1.00     Years: 4.00     Pack years: 4.00     Types: Cigarettes     Start date: 1973     Quit date: 1977     Years since quittin.4   • Smokeless tobacco: Never   • Tobacco comments:     college years [id number]   Substance and Sexual Activity   • Alcohol use: Yes     Alcohol/week: 2.0 standard drinks     Types: 1 Glasses of wine, 1 Cans of beer per week     Comment: social/ wine/beer occasionally   • Drug use: No   • Sexual activity: Not Currently     Partners: Male     Birth control/protection: None       Family History:   Family History   Problem Relation Age of Onset   • Diabetes Mother    • Diabetes Sister    • Skin cancer Father    • Cancer Father         skin   • Malig Hyperthermia  "Neg Hx        Surgical History:   Past Surgical History:   Procedure Laterality Date   • BREAST LUMPECTOMY Right     right 2012   • TOTAL HIP ARTHROPLASTY Right 10/16/2019    Procedure: RIGHT TOTAL HIP ARTHROPLASTY GUY NAVIGATION;  Surgeon: Paul Hercules MD;  Location: [facility];  Service: Orthopedics         Current Facility-Administered Medications:   •  lactated ringers infusion, 30 mL/hr, Intravenous, Continuous PRN, Los Nicolas MD, Last Rate: 30 mL/hr at 02/01/23 1110, 30 mL/hr at 02/01/23 1110    Allergies:   Allergies   Allergen Reactions   • Metformin And Related GI Intolerance     Admitted x2 with lactic acidosis   • Codeine Anxiety, Dizziness and Nausea Only   • Letrozole Rash   • Naproxen Anxiety and Dizziness   • Other Rash     METAL ALLERGY  \"PT STATES MIGHT BE NICKEL JUST NOT SURE\".        The following portions of the patient's history were reviewed by me and updated as appropriate: review of systems, allergies, current medications, past family history, past medical history, past social history, past surgical history and problem list.    Vitals:    02/01/23 1103   BP: 121/74   Pulse: 91   Resp: 18   Temp: 97.3 °F (36.3 °C)   SpO2: 95%       PHYSICAL EXAM:    CONSTITUTIONAL:  today's vital signs reviewed by me  GASTROINTESTINAL: abdomen is soft nontender nondistended with normal active bowel sounds, no masses are appreciated    Assessment/ Plan  We will proceed today with screening colonoscopy.    Risks and benefits as well as alternatives to endoscopic evaluation were explained to the patient and they voiced understanding and wish to proceed.  These risks include but are not limited to the risk of bleeding, perforation, adverse reaction to sedation, and missed lesions.  The patient was given the opportunity to ask questions prior to the endoscopic procedure.          "

## 2023-02-01 NOTE — ANESTHESIA PREPROCEDURE EVALUATION
Anesthesia Evaluation     Patient summary reviewed and Nursing notes reviewed                Airway   Mallampati: II  Dental - normal exam     Pulmonary - negative pulmonary ROS and normal exam   (-) not a smoker  Cardiovascular - normal exam    ECG reviewed    (+) hypertension,   (-) past MI, CAD      Neuro/Psych  (+) numbness, psychiatric history Anxiety,    GI/Hepatic/Renal/Endo    (+) obesity,   diabetes mellitus type 2, thyroid problem hypothyroidism    Musculoskeletal     Abdominal   (+) obese,    Substance History      OB/GYN          Other      history of cancer                  Anesthesia Plan    ASA 2     MAC       Anesthetic plan, risks, benefits, and alternatives have been provided, discussed and informed consent has been obtained with: patient.        CODE STATUS:

## 2023-02-02 LAB
LAB AP CASE REPORT: NORMAL
LAB AP CLINICAL INFORMATION: NORMAL
PATH REPORT.FINAL DX SPEC: NORMAL
PATH REPORT.GROSS SPEC: NORMAL

## 2023-03-14 ENCOUNTER — OFFICE VISIT (OUTPATIENT)
Dept: FAMILY MEDICINE CLINIC | Facility: CLINIC | Age: 68
End: 2023-03-14
Payer: MEDICARE

## 2023-03-14 VITALS
HEIGHT: 67 IN | WEIGHT: 204 LBS | DIASTOLIC BLOOD PRESSURE: 76 MMHG | SYSTOLIC BLOOD PRESSURE: 120 MMHG | BODY MASS INDEX: 32.02 KG/M2 | HEART RATE: 92 BPM | OXYGEN SATURATION: 98 %

## 2023-03-14 DIAGNOSIS — E11.65 TYPE 2 DIABETES MELLITUS WITH HYPERGLYCEMIA, WITHOUT LONG-TERM CURRENT USE OF INSULIN: Primary | ICD-10-CM

## 2023-03-14 DIAGNOSIS — E66.9 OBESITY (BMI 30-39.9): ICD-10-CM

## 2023-03-14 DIAGNOSIS — F32.1 CURRENT MODERATE EPISODE OF MAJOR DEPRESSIVE DISORDER WITHOUT PRIOR EPISODE: ICD-10-CM

## 2023-03-14 DIAGNOSIS — E03.8 OTHER SPECIFIED HYPOTHYROIDISM: ICD-10-CM

## 2023-03-14 DIAGNOSIS — I10 ESSENTIAL (PRIMARY) HYPERTENSION: ICD-10-CM

## 2023-03-14 DIAGNOSIS — E78.2 MIXED HYPERLIPIDEMIA: ICD-10-CM

## 2023-03-14 PROBLEM — E11.9 TYPE 2 DIABETES MELLITUS WITHOUT COMPLICATION (HCC): Status: ACTIVE | Noted: 2018-08-05

## 2023-03-14 PROCEDURE — 1160F RVW MEDS BY RX/DR IN RCRD: CPT | Performed by: FAMILY MEDICINE

## 2023-03-14 PROCEDURE — 99214 OFFICE O/P EST MOD 30 MIN: CPT | Performed by: FAMILY MEDICINE

## 2023-03-14 PROCEDURE — 3074F SYST BP LT 130 MM HG: CPT | Performed by: FAMILY MEDICINE

## 2023-03-14 PROCEDURE — 1159F MED LIST DOCD IN RCRD: CPT | Performed by: FAMILY MEDICINE

## 2023-03-14 PROCEDURE — 3078F DIAST BP <80 MM HG: CPT | Performed by: FAMILY MEDICINE

## 2023-03-14 RX ORDER — FLUOXETINE HYDROCHLORIDE 40 MG/1
40 CAPSULE ORAL DAILY
Qty: 90 CAPSULE | Refills: 3 | Status: SHIPPED | OUTPATIENT
Start: 2023-03-14

## 2023-03-14 RX ORDER — PEN NEEDLE, DIABETIC 30 GX3/16"
1 NEEDLE, DISPOSABLE MISCELLANEOUS DAILY
Qty: 90 EACH | Refills: 3 | Status: SHIPPED | OUTPATIENT
Start: 2023-03-14

## 2023-03-14 NOTE — PROGRESS NOTES
Subjective   Jennie Lofton is a 68 y.o. female. Presents today for   Chief Complaint   Patient presents with   • Follow-up   • Diabetes       History of Present Illness  Patient 67 y/o with dm2, htn, hld and hypothyroidism;  She has hx of anxiety but very depressed, fatigued;  She lost her  last May and Mother couple months ago and has been very hard. She has no motivation and stopped caring about her blood sugars;  Still on fluoxetine, but not helping;  No cp/soa; no self harm;       Blood sugars >300 this am;  Not checking often as depressed    Since last seen had c-scope, pathology as below.  Review of Systems   Constitutional: Positive for fatigue.   Eyes: Positive for visual disturbance.   Respiratory: Negative for shortness of breath.    Cardiovascular: Negative for chest pain and leg swelling.   Endocrine: Positive for polydipsia and polyuria.       Patient Active Problem List   Diagnosis   • Intractable vomiting with nausea   • Lactic acidosis   • Type 2 diabetes mellitus without complication (HCC)   • Hyponatremia   • Hypothyroid   • Essential hypertension   • Anxiety   • Breast mass   • Breast pain   • Luetscher's syndrome   • DM II (diabetes mellitus, type II), controlled (HCC)   • Encounter for screening colonoscopy   • History of breast cancer   • Syncope   • UTI (urinary tract infection)   • Arthritis of right hip   • Status post right hip replacement   • Right sided sciatica   • Chronic SI joint pain       Social History     Socioeconomic History   • Marital status:    Tobacco Use   • Smoking status: Former     Packs/day: 1.00     Years: 4.00     Pack years: 4.00     Types: Cigarettes     Start date: 1973     Quit date: 1977     Years since quittin.5   • Smokeless tobacco: Never   • Tobacco comments:     college years [id number]   Substance and Sexual Activity   • Alcohol use: Yes     Alcohol/week: 2.0 standard drinks     Types: 1 Glasses of wine, 1 Cans of beer per week  "    Comment: social/ wine/beer occasionally   • Drug use: No   • Sexual activity: Not Currently     Partners: Male     Birth control/protection: None       Allergies   Allergen Reactions   • Metformin And Related GI Intolerance     Admitted x2 with lactic acidosis   • Codeine Anxiety, Dizziness and Nausea Only   • Letrozole Rash   • Naproxen Anxiety and Dizziness   • Other Rash     METAL ALLERGY  \"PT STATES MIGHT BE NICKEL JUST NOT SURE\".       Current Outpatient Medications on File Prior to Visit   Medication Sig Dispense Refill   • Cholecalciferol (VITAMIN D3) 125 MCG (5000 UT) capsule capsule TAKE ONE CAPSULE BY MOUTH DAILY 90 capsule 0   • glipizide (GLUCOTROL XL) 5 MG ER tablet Take 2 tablets by mouth Daily. 180 tablet 3   • levothyroxine (SYNTHROID, LEVOTHROID) 137 MCG tablet TAKE ONE TABLET BY MOUTH DAILY 30 tablet 5   • lisinopril (PRINIVIL,ZESTRIL) 20 MG tablet TAKE ONE TABLET BY MOUTH DAILY 90 tablet 3   • Multiple Vitamins-Minerals (PRESERVISION AREDS 2 PO) Take  by mouth.     • SITagliptin (Januvia) 100 MG tablet Take 1 tablet by mouth Daily. 90 tablet 3   • [DISCONTINUED] FLUoxetine (PROzac) 20 MG capsule TAKE ONE CAPSULE BY MOUTH DAILY 30 capsule 6   • Accu-Chek Maddison Plus test strip USE ONE STRIP TO TEST DAILY (Patient not taking: Reported on 3/14/2023) 50 each 3   • Accu-Chek Softclix Lancets lancets USE ONE LANCET TO TEST DAILY (Patient not taking: Reported on 3/14/2023) 100 each 11   • ALPRAZolam (XANAX) 0.25 MG tablet TAKE ONE TABLET BY MOUTH TWICE A DAY AS NEEDED FOR ANXIETY (Patient not taking: Reported on 3/14/2023) 15 tablet 2   • vitamin B-12 (CYANOCOBALAMIN) 1000 MCG tablet Take 2,500 mcg by mouth Daily. (Patient not taking: Reported on 3/14/2023)     • [DISCONTINUED] Cholecalciferol (Vitamin D3) 25 MCG (1000 UT) capsule Daily. (Patient not taking: Reported on 3/14/2023)     • [DISCONTINUED] ezetimibe (Zetia) 10 MG tablet Take 1 tablet by mouth Daily. (Patient not taking: Reported on " "3/14/2023) 30 tablet 5     No current facility-administered medications on file prior to visit.       Objective   Vitals:    03/14/23 1117   BP: 120/76   Pulse: 92   SpO2: 98%   Weight: 92.5 kg (204 lb)   Height: 170.2 cm (67.01\")     Body mass index is 31.94 kg/m².    Physical Exam  Vitals and nursing note reviewed.   Constitutional:       Appearance: She is well-developed.   Skin:     General: Skin is warm and dry.   Neurological:      Mental Status: She is alert and oriented to person, place, and time.   Psychiatric:         Behavior: Behavior normal.       Case Report   Surgical Pathology Report                         Case: [id number]                                   Authorizing Provider:  Los Nicolas MD    Collected:           02/01/2023 12:00 PM           Ordering Location:     [facility]     Received:            02/01/2023 12:42 PM                                  CENTER [place] [facility]IN OR                                                      Pathologist:           Erica Galan MD                                                     Specimens:   1) - Large Intestine, Left / Descending Colon, descending polyp                                      2) - Large Intestine, Transverse Colon, transverse polyp x2                                          3) - Large Intestine, Right / Ascending Colon, Ascending polyp x3                                    4) - Large Intestine, Sigmoid Colon, sigmoid polyp x2                                      Clinical Information    Hot snare   Final Diagnosis   1. Descending Colon, Biopsy:  A. Tubular adenoma.  B. Negative for high-grade dysplasia.     2. Transverse Colon, Biopsy:               A. Sessile serrated lesion.     3. Ascending Colon, Biopsy:  A. Tubular adenomas and sessile serrated lesion.  B. Negative for high-grade dysplasia.      4. Sigmoid Colon, Biopsies:               A. Hyperplastic polyps.     swm/pkm        Assessment & Plan "   Diagnoses and all orders for this visit:    1. Type 2 diabetes mellitus with hyperglycemia, without long-term current use of insulin (HCC) (Primary)  -     Insulin Glargine, 1 Unit Dial, (TOUJEO) 300 UNIT/ML solution pen-injector injection; 10 units nightly, increase by 2 units weekly until FBS <150 max 40 units/24 hours  Dispense: 7.5 mL; Refill: 3  -     Insulin Pen Needle (Pen Needles) 32G X 4 MM misc; 1 application Daily.  Dispense: 90 each; Refill: 3  -     Microalbumin / Creatinine Urine Ratio - Urine, Clean Catch  -     Lipid Panel  -     Comprehensive Metabolic Panel  -     Hemoglobin A1c  -     TSH    2. Current moderate episode of major depressive disorder without prior episode (HCC)  -     FLUoxetine (PROzac) 40 MG capsule; Take 1 capsule by mouth Daily.  Dispense: 90 capsule; Refill: 3    3. Obesity (BMI 30-39.9)    4. Essential (primary) hypertension    5. Mixed hyperlipidemia  -     Lipid Panel  -     Comprehensive Metabolic Panel    6. Other specified hypothyroidism  -     TSH    intolerant ozempic and farxiga in past;  Will add insulin and titrate to improve;    Will go up on fluoxetine form 20 to 40mg daily  -HLD - continue statin, recheck lipids.  -hypothyroidism - continue medication, recheck thyroid labs.  -hypertension - controlled, continue medications        BMI above goal, educational material on diet and exercise provided in AVS.         -Follow up: 8 weeks and prn

## 2023-03-14 NOTE — PATIENT INSTRUCTIONS
Advance Care Planning and Advance Directives     You make decisions on a daily basis - decisions about where you want to live, your career, your home, your life. Perhaps one of the most important decisions you face is your choice for future medical care. Take time to talk with your family and your healthcare team and start planning today.  Advance Care Planning is a process that can help you:  Understand possible future healthcare decisions in light of your own experiences  Reflect on those decision in light of your goals and values  Discuss your decisions with those closest to you and the healthcare professionals that care for you  Make a plan by creating a document that reflects your wishes    Surrogate Decision Maker  In the event of a medical emergency, which has left you unable to communicate or to make your own decisions, you would need someone to make decisions for you.  It is important to discuss your preferences for medical treatment with this person while you are in good health.     Qualities of a surrogate decision maker:  Willing to take on this role and responsibility  Knows what you want for future medical care  Willing to follow your wishes even if they don't agree with them  Able to make difficult medical decisions under stressful circumstances    Advance Directives  These are legal documents you can create that will guide your healthcare team and decision maker(s) when needed. These documents can be stored in the electronic medical record.    Living Will - a legal document to guide your care if you have a terminal condition or a serious illness and are unable to communicate. States vary by statute in document names/types, but most forms may include one or more of the following:        -  Directions regarding life-prolonging treatments        -  Directions regarding artificially provided nutrition/hydration        -  Choosing a healthcare decision maker        -  Direction regarding organ/tissue  "donation    Durable Power of  for Healthcare - this document names an -in-fact to make medical decisions for you, but it may also allow this person to make personal and financial decisions for you. Please seek the advice of an  if you need this type of document.    **Advance Directives are not required and no one may discriminate against you if you do not sign one.    Medical Orders  Many states allow specific forms/orders signed by your physician to record your wishes for medical treatment in your current state of health. This form, signed in personal communication with your physician, addresses resuscitation and other medical interventions that you may or may not want.      For more information or to schedule a time with a [facility] Advance Care Planning Facilitator contact: [facility] [address] [facility] or call [phone] and someone will contact you directly.\"[id number]-Almost None!\"  Healthy Habits Start Early    EAT 5 OR MORE SERVINGS OF VEGETABLES AND FRUITS EVERY DAY.    Help Jennie get three vegetables and two fruits each day. Red, green, yellow, orange...encourage them to try all the colors so they can enjoy different flavors and get more vitamins.    How can I help Jennie do this?  ---------------------------------------------  -BE PATIENT WITH Jennie, remember it may take 10 times before they start to like new food. So, start with small bites and just keep trying.  -Serve at least one vegetable or fruit at every meal. Even try two. Remember, portions do not have to be as big as you think.  -Encourage eating fruits and vegetables instead of drinking them..it's a better way to get fiber and vitamins..so limit the amount of juice to 1/2 cup per day for children 1-6 years and one cup per day for children 7-18 years of age. Try using 1/2 part water and 1/2 part juice.    Spend less than two hours per day watching television and other screen media. Screen media includes video games, movies " and computer use for entertainment.    How can I help Jennie do this?  -Turn off the TV at dinner. Dinner is the best time to hang out with your kids and just talk, learn about their day, and tell them about your day. Your kids have a lot to learn from you and dinner is a great time to share.

## 2023-03-15 ENCOUNTER — PATIENT ROUNDING (BHMG ONLY) (OUTPATIENT)
Dept: FAMILY MEDICINE CLINIC | Facility: CLINIC | Age: 68
End: 2023-03-15
Payer: MEDICARE

## 2023-03-15 LAB
ALBUMIN SERPL-MCNC: 4.4 G/DL (ref 3.8–4.8)
ALBUMIN/CREAT UR: 4 MG/G CREAT (ref 0–29)
ALBUMIN/GLOB SERPL: 1.7 {RATIO} (ref 1.2–2.2)
ALP SERPL-CCNC: 71 IU/L (ref 44–121)
ALT SERPL-CCNC: 22 IU/L (ref 0–32)
AST SERPL-CCNC: 18 IU/L (ref 0–40)
BILIRUB SERPL-MCNC: 0.3 MG/DL (ref 0–1.2)
BUN SERPL-MCNC: 15 MG/DL (ref 8–27)
BUN/CREAT SERPL: 19 (ref 12–28)
CALCIUM SERPL-MCNC: 10.2 MG/DL (ref 8.7–10.3)
CHLORIDE SERPL-SCNC: 88 MMOL/L (ref 96–106)
CHOLEST SERPL-MCNC: 293 MG/DL (ref 100–199)
CO2 SERPL-SCNC: 22 MMOL/L (ref 20–29)
CREAT SERPL-MCNC: 0.8 MG/DL (ref 0.57–1)
CREAT UR-MCNC: 102.5 MG/DL
EGFRCR SERPLBLD CKD-EPI 2021: 80 ML/MIN/1.73
GLOBULIN SER CALC-MCNC: 2.6 G/DL (ref 1.5–4.5)
GLUCOSE SERPL-MCNC: 324 MG/DL (ref 70–99)
HBA1C MFR BLD: 10.2 % (ref 4.8–5.6)
HDLC SERPL-MCNC: 41 MG/DL
LDLC SERPL CALC-MCNC: 169 MG/DL (ref 0–99)
MICROALBUMIN UR-MCNC: 4.3 UG/ML
POTASSIUM SERPL-SCNC: 5.5 MMOL/L (ref 3.5–5.2)
PROT SERPL-MCNC: 7 G/DL (ref 6–8.5)
SODIUM SERPL-SCNC: 128 MMOL/L (ref 134–144)
TRIGL SERPL-MCNC: 425 MG/DL (ref 0–149)
TSH SERPL DL<=0.005 MIU/L-ACNC: 0.73 UIU/ML (ref 0.45–4.5)
VLDLC SERPL CALC-MCNC: 83 MG/DL (ref 5–40)

## 2023-03-15 NOTE — PROGRESS NOTES
A [employer] message has been sent to the patient for PATIENT ROUNDING with [facility].

## 2023-03-15 NOTE — PROGRESS NOTES
Call results to patient.  Diabetes is very uncontrolled with high blood sugar.  Start insulin as planned.  Let know in 2 weeks how blood sugars doing well.  Cholseterol is very high, if can tolerate would start rosuvastatin 10 mg po daily  Thyroid appropriate range

## 2023-03-16 RX ORDER — ROSUVASTATIN CALCIUM 10 MG/1
10 TABLET, COATED ORAL NIGHTLY
Qty: 30 TABLET | Refills: 5 | Status: SHIPPED | OUTPATIENT
Start: 2023-03-16

## 2023-04-14 DIAGNOSIS — E03.8 OTHER SPECIFIED HYPOTHYROIDISM: ICD-10-CM

## 2023-04-14 RX ORDER — LEVOTHYROXINE SODIUM 137 UG/1
TABLET ORAL
Qty: 90 TABLET | Refills: 0 | Status: SHIPPED | OUTPATIENT
Start: 2023-04-14

## 2023-06-13 ENCOUNTER — OFFICE VISIT (OUTPATIENT)
Dept: FAMILY MEDICINE CLINIC | Facility: CLINIC | Age: 68
End: 2023-06-13
Payer: MEDICARE

## 2023-06-13 VITALS
SYSTOLIC BLOOD PRESSURE: 126 MMHG | HEART RATE: 95 BPM | HEIGHT: 67 IN | WEIGHT: 197 LBS | DIASTOLIC BLOOD PRESSURE: 66 MMHG | OXYGEN SATURATION: 96 % | BODY MASS INDEX: 30.92 KG/M2

## 2023-06-13 DIAGNOSIS — E11.9 TYPE 2 DIABETES MELLITUS WITHOUT COMPLICATION, WITH LONG-TERM CURRENT USE OF INSULIN: Primary | ICD-10-CM

## 2023-06-13 DIAGNOSIS — M48.062 SPINAL STENOSIS OF LUMBAR REGION WITH NEUROGENIC CLAUDICATION: ICD-10-CM

## 2023-06-13 DIAGNOSIS — E66.9 OBESITY (BMI 30-39.9): ICD-10-CM

## 2023-06-13 DIAGNOSIS — I73.9 CLAUDICATION: ICD-10-CM

## 2023-06-13 DIAGNOSIS — F41.1 GAD (GENERALIZED ANXIETY DISORDER): ICD-10-CM

## 2023-06-13 DIAGNOSIS — Z79.4 TYPE 2 DIABETES MELLITUS WITHOUT COMPLICATION, WITH LONG-TERM CURRENT USE OF INSULIN: Primary | ICD-10-CM

## 2023-06-13 RX ORDER — GLIPIZIDE 5 MG/1
5 TABLET, FILM COATED, EXTENDED RELEASE ORAL DAILY
Qty: 90 TABLET | Refills: 3
Start: 2023-06-13

## 2023-06-13 RX ORDER — FLUOXETINE HYDROCHLORIDE 20 MG/1
20 CAPSULE ORAL DAILY
Qty: 90 CAPSULE | Refills: 3 | Status: SHIPPED | OUTPATIENT
Start: 2023-06-13

## 2023-06-13 NOTE — PATIENT INSTRUCTIONS
Calorie Counting for Weight Loss  Calories are units of energy. Your body needs a certain number of calories from food to keep going throughout the day. When you eat or drink more calories than your body needs, your body stores the extra calories mostly as fat. When you eat or drink fewer calories than your body needs, your body burns fat to get the energy it needs.  Calorie counting means keeping track of how many calories you eat and drink each day. Calorie counting can be helpful if you need to lose weight. If you eat fewer calories than your body needs, you should lose weight. Ask your health care provider what a healthy weight is for you.  For calorie counting to work, you will need to eat the right number of calories each day to lose a healthy amount of weight per week. A dietitian can help you figure out how many calories you need in a day and will suggest ways to reach your calorie goal.  A healthy amount of weight to lose each week is usually 1-2 lb (0.5-0.9 kg). This usually means that your daily calorie intake should be reduced by 500-750 calories.  Eating 1,200-1,500 calories a day can help most women lose weight.  Eating 1,500-1,800 calories a day can help most men lose weight.  What do I need to know about calorie counting?  Work with your health care provider or dietitian to determine how many calories you should get each day. To meet your daily calorie goal, you will need to:  Find out how many calories are in each food that you would like to eat. Try to do this before you eat.  Decide how much of the food you plan to eat.  Keep a food log. Do this by writing down what you ate and how many calories it had.  To successfully lose weight, it is important to balance calorie counting with a healthy lifestyle that includes regular activity.  Where do I find calorie information?  The number of calories in a food can be found on a Nutrition Facts label. If a food does not have a Nutrition Facts label, try to  look up the calories online or ask your dietitian for help.  Remember that calories are listed per serving. If you choose to have more than one serving of a food, you will have to multiply the calories per serving by the number of servings you plan to eat. For example, the label on a package of bread might say that a serving size is 1 slice and that there are 90 calories in a serving. If you eat 1 slice, you will have eaten 90 calories. If you eat 2 slices, you will have eaten 180 calories.  How do I keep a food log?  After each time that you eat, record the following in your food log as soon as possible:  What you ate. Be sure to include toppings, sauces, and other extras on the food.  How much you ate. This can be measured in cups, ounces, or number of items.  How many calories were in each food and drink.  The total number of calories in the food you ate.  Keep your food log near you, such as in a pocket-sized notebook or on an león or website on your mobile phone. Some programs will calculate calories for you and show you how many calories you have left to meet your daily goal.  What are some portion-control tips?  Know how many calories are in a serving. This will help you know how many servings you can have of a certain food.  Use a measuring cup to measure serving sizes. You could also try weighing out portions on a kitchen scale. With time, you will be able to estimate serving sizes for some foods.  Take time to put servings of different foods on your favorite plates or in your favorite bowls and cups so you know what a serving looks like.  Try not to eat straight from a food's packaging, such as from a bag or box. Eating straight from the package makes it hard to see how much you are eating and can lead to overeating. Put the amount you would like to eat in a cup or on a plate to make sure you are eating the right portion.  Use smaller plates, glasses, and bowls for smaller portions and to prevent  overeating.  Try not to multitask. For example, avoid watching TV or using your computer while eating. If it is time to eat, sit down at a table and enjoy your food. This will help you recognize when you are full. It will also help you be more mindful of what and how much you are eating.  What are tips for following this plan?  Reading food labels  Check the calorie count compared with the serving size. The serving size may be smaller than what you are used to eating.  Check the source of the calories. Try to choose foods that are high in protein, fiber, and vitamins, and low in saturated fat, trans fat, and sodium.  Shopping  Read nutrition labels while you shop. This will help you make healthy decisions about which foods to buy.  Pay attention to nutrition labels for low-fat or fat-free foods. These foods sometimes have the same number of calories or more calories than the full-fat versions. They also often have added sugar, starch, or salt to make up for flavor that was removed with the fat.  Make a grocery list of lower-calorie foods and stick to it.  Cooking  Try to cook your favorite foods in a healthier way. For example, try baking instead of frying.  Use low-fat dairy products.  Meal planning  Use more fruits and vegetables. One-half of your plate should be fruits and vegetables.  Include lean proteins, such as chicken, turkey, and fish.  Lifestyle  Each week, aim to do one of the followin minutes of moderate exercise, such as walking.  75 minutes of vigorous exercise, such as running.  General information  Know how many calories are in the foods you eat most often. This will help you calculate calorie counts faster.  Find a way of tracking calories that works for you. Get creative. Try different apps or programs if writing down calories does not work for you.  What foods should I eat?    Eat nutritious foods. It is better to have a nutritious, high-calorie food, such as an avocado, than a food with  few nutrients, such as a bag of potato chips.  Use your calories on foods and drinks that will fill you up and will not leave you hungry soon after eating.  Examples of foods that fill you up are nuts and nut butters, vegetables, lean proteins, and high-fiber foods such as whole grains. High-fiber foods are foods with more than 5 g of fiber per serving.  Pay attention to calories in drinks. Low-calorie drinks include water and unsweetened drinks.  The items listed above may not be a complete list of foods and beverages you can eat. Contact a dietitian for more information.  What foods should I limit?  Limit foods or drinks that are not good sources of vitamins, minerals, or protein or that are high in unhealthy fats. These include:  Candy.  Other sweets.  Sodas, specialty coffee drinks, alcohol, and juice.  The items listed above may not be a complete list of foods and beverages you should avoid. Contact a dietitian for more information.  How do I count calories when eating out?  Pay attention to portions. Often, portions are much larger when eating out. Try these tips to keep portions smaller:  Consider sharing a meal instead of getting your own.  If you get your own meal, eat only half of it. Before you start eating, ask for a container and put half of your meal into it.  When available, consider ordering smaller portions from the menu instead of full portions.  Pay attention to your food and drink choices. Knowing the way food is cooked and what is included with the meal can help you eat fewer calories.  If calories are listed on the menu, choose the lower-calorie options.  Choose dishes that include vegetables, fruits, whole grains, low-fat dairy products, and lean proteins.  Choose items that are boiled, broiled, grilled, or steamed. Avoid items that are buttered, battered, fried, or served with cream sauce. Items labeled as crispy are usually fried, unless stated otherwise.  Choose water, low-fat milk,  unsweetened iced tea, or other drinks without added sugar. If you want an alcoholic beverage, choose a lower-calorie option, such as a glass of wine or light beer.  Ask for dressings, sauces, and syrups on the side. These are usually high in calories, so you should limit the amount you eat.  If you want a salad, choose a garden salad and ask for grilled meats. Avoid extra toppings such as dorsey, cheese, or fried items. Ask for the dressing on the side, or ask for olive oil and vinegar or lemon to use as dressing.  Estimate how many servings of a food you are given. Knowing serving sizes will help you be aware of how much food you are eating at restaurants.  Where to find more information  Centers for Disease Control and Prevention: www.cdc.gov  [place] Department of Agriculture: myplate.gov  Summary  Calorie counting means keeping track of how many calories you eat and drink each day. If you eat fewer calories than your body needs, you should lose weight.  A healthy amount of weight to lose per week is usually 1-2 lb (0.5-0.9 kg). This usually means reducing your daily calorie intake by 500-750 calories.  The number of calories in a food can be found on a Nutrition Facts label. If a food does not have a Nutrition Facts label, try to look up the calories online or ask your dietitian for help.  Use smaller plates, glasses, and bowls for smaller portions and to prevent overeating.  Use your calories on foods and drinks that will fill you up and not leave you hungry shortly after a meal.  This information is not intended to replace advice given to you by your health care provider. Make sure you discuss any questions you have with your health care provider.  Document Revised: 01/28/2021 Document Reviewed: 01/28/2021  Elsevier Patient Education © 2022 [employer]    Exercising to Lose Weight  Getting regular exercise is important for everyone. It is especially important if you are overweight. Being overweight increases  your risk of heart disease, stroke, diabetes, high blood pressure, and several types of cancer. Exercising, and reducing the calories you consume, can help you lose weight and improve fitness and health.  Exercise can be moderate or vigorous intensity. To lose weight, most people need to do a certain amount of moderate or vigorous-intensity exercise each week.  How can exercise affect me?  You lose weight when you exercise enough to burn more calories than you eat. Exercise also reduces body fat and builds muscle. The more muscle you have, the more calories you burn. Exercise also:  Improves mood.  Reduces stress and tension.  Improves your overall fitness, flexibility, and endurance.  Increases bone strength.  Moderate-intensity exercise  Moderate-intensity exercise is any activity that gets you moving enough to burn at least three times more energy (calories) than if you were sitting.  Examples of moderate exercise include:  Walking a mile in 15 minutes.  Doing light yard work.  Biking at an easy pace.  Most people should get at least 150 minutes of moderate-intensity exercise a week to maintain their body weight.  Vigorous-intensity exercise  Vigorous-intensity exercise is any activity that gets you moving enough to burn at least six times more calories than if you were sitting. When you exercise at this intensity, you should be working hard enough that you are not able to carry on a conversation.  Examples of vigorous exercise include:  Running.  Playing a team sport, such as football, basketball, and soccer.  Jumping rope.  Most people should get at least 75 minutes a week of vigorous exercise to maintain their body weight.  What actions can I take to lose weight?  The amount of exercise you need to lose weight depends on:  Your age.  The type of exercise.  Any health conditions you have.  Your overall physical ability.  Talk to your health care provider about how much exercise you need and what types of  activities are safe for you.  Nutrition    Make changes to your diet as told by your health care provider or diet and nutrition specialist (dietitian). This may include:  Eating fewer calories.  Eating more protein.  Eating less unhealthy fats.  Eating a diet that includes fresh fruits and vegetables, whole grains, low-fat dairy products, and lean protein.  Avoiding foods with added fat, salt, and sugar.  Drink plenty of water while you exercise to prevent dehydration or heat stroke.  Activity  Choose an activity that you enjoy and set realistic goals. Your health care provider can help you make an exercise plan that works for you.  Exercise at a moderate or vigorous intensity most days of the week.  The intensity of exercise may vary from person to person. You can tell how intense a workout is for you by paying attention to your breathing and heartbeat. Most people will notice their breathing and heartbeat get faster with more intense exercise.  Do resistance training twice each week, such as:  Push-ups.  Sit-ups.  Lifting weights.  Using resistance bands.  Getting short amounts of exercise can be just as helpful as long, structured periods of exercise. If you have trouble finding time to exercise, try doing these things as part of your daily routine:  Get up, stretch, and walk around every 30 minutes throughout the day.  Go for a walk during your lunch break.  Park your car farther away from your destination.  If you take public transportation, get off one stop early and walk the rest of the way.  Make phone calls while standing up and walking around.  Take the stairs instead of elevators or escalators.  Wear comfortable clothes and shoes with good support.  Do not exercise so much that you hurt yourself, feel dizzy, or get very short of breath.  Where to find more information  [place] Department of Health and Human Services: www.hhs.gov  Centers for Disease Control and Prevention: www.cdc.gov  Contact a health care  provider:  Before starting a new exercise program.  If you have questions or concerns about your weight.  If you have a medical problem that keeps you from exercising.  Get help right away if:  You have any of the following while exercising:  Injury.  Dizziness.  Difficulty breathing or shortness of breath that does not go away when you stop exercising.  Chest pain.  Rapid heartbeat.  These symptoms may represent a serious problem that is an emergency. Do not wait to see if the symptoms will go away. Get medical help right away. Call your local emergency services (911 in the [place] Do not drive yourself to the hospital.  Summary  Getting regular exercise is especially important if you are overweight.  Being overweight increases your risk of heart disease, stroke, diabetes, high blood pressure, and several types of cancer.  Losing weight happens when you burn more calories than you eat.  Reducing the amount of calories you eat, and getting regular moderate or vigorous exercise each week, helps you lose weight.  This information is not intended to replace advice given to you by your health care provider. Make sure you discuss any questions you have with your health care provider.  Document Revised: 02/13/2022 Document Reviewed: 02/13/2022  Elsevier Patient Education © 2022 [employer]

## 2023-06-13 NOTE — PROGRESS NOTES
Subjective   Jennie Lofton is a 68 y.o. female. Presents today for   Chief Complaint   Patient presents with    Anxiety    Diabetes       History of Present Illness  Patient 69 y/o with dm2, was uncontrolled but much better on insulin and had to cut out 1 glipizide;   Mood better on fluoxetine change, lost Mother and  recently.  Would liek go back on 20mg of fluoxetien and doing well.     Patient still chronic low back pain;  relieved with leanign forward, sitting down;   has pain in legs.  Feels has spinal stenosis    Review of Systems   Respiratory:  Negative for shortness of breath.    Cardiovascular:  Negative for chest pain.   Psychiatric/Behavioral:  The patient is not nervous/anxious.      Patient Active Problem List   Diagnosis    Intractable vomiting with nausea    Lactic acidosis    Type 2 diabetes mellitus without complication    Hyponatremia    Hypothyroid    Essential hypertension    Anxiety    Breast mass    Breast pain    Luetscher's syndrome    DM II (diabetes mellitus, type II), controlled    Encounter for screening colonoscopy    History of breast cancer    Syncope    UTI (urinary tract infection)    Arthritis of right hip    Status post right hip replacement    Right sided sciatica    Chronic SI joint pain       Social History     Socioeconomic History    Marital status:    Tobacco Use    Smoking status: Former     Packs/day: 1.00     Years: 4.00     Pack years: 4.00     Types: Cigarettes     Start date: 1973     Quit date: 1977     Years since quittin.8    Smokeless tobacco: Never    Tobacco comments:     college years [id number]   Substance and Sexual Activity    Alcohol use: Yes     Alcohol/week: 2.0 standard drinks     Types: 1 Glasses of wine, 1 Cans of beer per week     Comment: social/ wine/beer occasionally    Drug use: No    Sexual activity: Not Currently     Partners: Male     Birth control/protection: None       Allergies   Allergen Reactions    Metformin  "And Related GI Intolerance     Admitted x2 with lactic acidosis    Codeine Anxiety, Dizziness and Nausea Only    Letrozole Rash    Naproxen Anxiety and Dizziness    Other Rash     METAL ALLERGY  \"PT STATES MIGHT BE NICKEL JUST NOT SURE\".       Current Outpatient Medications on File Prior to Visit   Medication Sig Dispense Refill    Accu-Chek Maddison Plus test strip USE ONE STRIP TO TEST DAILY 50 each 3    Accu-Chek Softclix Lancets lancets USE ONE LANCET TO TEST DAILY 100 each 11    ALPRAZolam (XANAX) 0.25 MG tablet TAKE ONE TABLET BY MOUTH TWICE A DAY AS NEEDED FOR ANXIETY 15 tablet 2    Cholecalciferol (VITAMIN D3) 125 MCG (5000 UT) capsule capsule TAKE ONE CAPSULE BY MOUTH DAILY 90 capsule 0    Insulin Glargine, 1 Unit Dial, (TOUJEO) 300 UNIT/ML solution pen-injector injection 10 units nightly, increase by 2 units weekly until FBS <150 max 40 units/24 hours 7.5 mL 3    Insulin Pen Needle (Pen Needles) 32G X 4 MM misc 1 application Daily. 90 each 3    levothyroxine (SYNTHROID, LEVOTHROID) 137 MCG tablet TAKE ONE TABLET BY MOUTH DAILY 90 tablet 0    lisinopril (PRINIVIL,ZESTRIL) 20 MG tablet TAKE ONE TABLET BY MOUTH DAILY 90 tablet 3    Multiple Vitamins-Minerals (PRESERVISION AREDS 2 PO) Take  by mouth.      rosuvastatin (Crestor) 10 MG tablet Take 1 tablet by mouth Every Night. 30 tablet 5    SITagliptin (Januvia) 100 MG tablet Take 1 tablet by mouth Daily. 90 tablet 3    vitamin B-12 (CYANOCOBALAMIN) 1000 MCG tablet Take 2.5 tablets by mouth Daily.      [DISCONTINUED] FLUoxetine (PROzac) 40 MG capsule Take 1 capsule by mouth Daily. 90 capsule 3    [DISCONTINUED] glipizide (GLUCOTROL XL) 5 MG ER tablet Take 2 tablets by mouth Daily. 180 tablet 3     No current facility-administered medications on file prior to visit.       Objective   Vitals:    06/13/23 1018   BP: 126/66   Pulse: 95   SpO2: 96%   Weight: 89.4 kg (197 lb)   Height: 170.2 cm (67\")     Body mass index is 30.85 kg/m².    Physical Exam  Vitals and " nursing note reviewed.   Constitutional:       Appearance: Normal appearance. She is not toxic-appearing or diaphoretic.   HENT:      Head: Normocephalic and atraumatic.   Musculoskeletal:      Cervical back: Neck supple.   Skin:     General: Skin is warm and dry.      Capillary Refill: Capillary refill takes less than 2 seconds.   Neurological:      Mental Status: She is alert.   Psychiatric:         Mood and Affect: Mood normal.         Behavior: Behavior normal.       Assessment & Plan   Diagnoses and all orders for this visit:    1. Type 2 diabetes mellitus without complication, with long-term current use of insulin (Primary)  -     glipizide (GLUCOTROL XL) 5 MG ER tablet; Take 1 tablet by mouth Daily.  Dispense: 90 tablet; Refill: 3  -     Hemoglobin A1c    2. JAIRO (generalized anxiety disorder)    3. Obesity (BMI 30-39.9)    4. Claudication  -     Doppler Ankle Brachial Index Single Level CAR; Future    5. Spinal stenosis of lumbar region with neurogenic claudication  -     MRI Lumbar Spine Without Contrast; Future    Other orders  -     FLUoxetine (PROzac) 20 MG capsule; Take 1 capsule by mouth Daily.  Dispense: 90 capsule; Refill: 3    Ok dec fluoxetien to 20mg daily as doing well  Dm2 ok cut glipizide down as had to 1 daily as low bs risky;  continue insulin as doing;    Check MRI of legs as getting wrose and impacting activity;  Does sound like spinal stneosis;   also though pain in legs and dm2, would screen for PVD.         BMI is >= 30 and <35. (Class 1 Obesity). The following options were offered after discussion;: weight loss educational material (shared in after visit summary)     -Follow up: 3 months and prn

## 2023-06-14 LAB — HBA1C MFR BLD: 6.9 % (ref 4.8–5.6)

## 2023-06-15 ENCOUNTER — PATIENT ROUNDING (BHMG ONLY) (OUTPATIENT)
Dept: FAMILY MEDICINE CLINIC | Facility: CLINIC | Age: 68
End: 2023-06-15
Payer: MEDICARE

## 2023-06-15 NOTE — PROGRESS NOTES
A [employer] message has been sent to the patient for PATIENT ROUNDING with [facility].  
social work

## 2023-07-01 PROBLEM — R30.0 DYSURIA: Status: ACTIVE | Noted: 2023-07-01

## 2023-08-11 ENCOUNTER — APPOINTMENT (OUTPATIENT)
Dept: MRI IMAGING | Facility: HOSPITAL | Age: 68
End: 2023-08-11
Payer: MEDICARE

## 2023-08-11 ENCOUNTER — HOSPITAL ENCOUNTER (OUTPATIENT)
Dept: CARDIOLOGY | Facility: HOSPITAL | Age: 68
Discharge: HOME | End: 2023-08-11
Payer: MEDICARE

## 2023-08-11 DIAGNOSIS — I73.9 CLAUDICATION: ICD-10-CM

## 2023-08-11 LAB
BH CV LOWER ARTERIAL LEFT ABI RATIO: 1.08
BH CV LOWER ARTERIAL LEFT DORSALIS PEDIS SYS MAX: 118
BH CV LOWER ARTERIAL LEFT GREAT TOE SYS MAX: 106
BH CV LOWER ARTERIAL LEFT POST TIBIAL SYS MAX: 116
BH CV LOWER ARTERIAL LEFT TBI RATIO: 0.97
BH CV LOWER ARTERIAL RIGHT ABI RATIO: 1
BH CV LOWER ARTERIAL RIGHT DORSALIS PEDIS SYS MAX: 109
BH CV LOWER ARTERIAL RIGHT GREAT TOE SYS MAX: 89
BH CV LOWER ARTERIAL RIGHT POST TIBIAL SYS MAX: 109
BH CV LOWER ARTERIAL RIGHT TBI RATIO: 0.82
UPPER ARTERIAL LEFT ARM BRACHIAL SYS MAX: 109 MMHG
UPPER ARTERIAL RIGHT ARM BRACHIAL SYS MAX: 103 MMHG

## 2023-08-11 PROCEDURE — 93922 UPR/L XTREMITY ART 2 LEVELS: CPT

## 2023-08-14 ENCOUNTER — TELEPHONE (OUTPATIENT)
Dept: FAMILY MEDICINE CLINIC | Facility: CLINIC | Age: 68
End: 2023-08-14
Payer: MEDICARE

## 2023-08-14 NOTE — TELEPHONE ENCOUNTER
Pt had to cancel Mri appt needs open Mri or standing MRI please advise due to not able to do closed spaces

## 2023-08-15 DIAGNOSIS — M48.062 SPINAL STENOSIS OF LUMBAR REGION WITH NEUROGENIC CLAUDICATION: Primary | ICD-10-CM

## 2023-09-13 RX ORDER — BLOOD SUGAR DIAGNOSTIC
1 STRIP MISCELLANEOUS DAILY
Qty: 100 EACH | Refills: 3 | Status: SHIPPED | OUTPATIENT
Start: 2023-09-13

## 2023-09-28 DIAGNOSIS — I10 ESSENTIAL (PRIMARY) HYPERTENSION: ICD-10-CM

## 2023-09-28 DIAGNOSIS — E11.9 TYPE 2 DIABETES MELLITUS WITHOUT COMPLICATION, WITHOUT LONG-TERM CURRENT USE OF INSULIN: ICD-10-CM

## 2023-09-28 DIAGNOSIS — E03.8 OTHER SPECIFIED HYPOTHYROIDISM: ICD-10-CM

## 2023-09-29 RX ORDER — LEVOTHYROXINE SODIUM 137 UG/1
TABLET ORAL
Qty: 90 TABLET | Refills: 0 | Status: SHIPPED | OUTPATIENT
Start: 2023-09-29

## 2023-09-29 RX ORDER — LISINOPRIL 20 MG/1
TABLET ORAL
Qty: 90 TABLET | Refills: 3 | Status: SHIPPED | OUTPATIENT
Start: 2023-09-29

## 2023-10-05 ENCOUNTER — TELEPHONE (OUTPATIENT)
Dept: FAMILY MEDICINE CLINIC | Facility: CLINIC | Age: 68
End: 2023-10-05

## 2023-10-05 NOTE — TELEPHONE ENCOUNTER
Caller: Jennie Lofton    Relationship: Self    Best call back number: [phone]    Caller requesting test results: PATIENT    What test was performed: MRI OF LOWER BACK    When was the test performed: 09/19/23    Where was the test performed: [facility]    Additional notes: PATIENT REQUEST CALL BACK WITH RESULTS

## 2023-10-06 DIAGNOSIS — M48.062 SPINAL STENOSIS OF LUMBAR REGION WITH NEUROGENIC CLAUDICATION: Primary | ICD-10-CM

## 2023-10-11 DIAGNOSIS — E11.9 TYPE 2 DIABETES MELLITUS WITHOUT COMPLICATION, WITH LONG-TERM CURRENT USE OF INSULIN: ICD-10-CM

## 2023-10-11 DIAGNOSIS — E08.42 DIABETES MELLITUS DUE TO UNDERLYING CONDITION WITH DIABETIC POLYNEUROPATHY, WITHOUT LONG-TERM CURRENT USE OF INSULIN: ICD-10-CM

## 2023-10-11 DIAGNOSIS — Z79.4 TYPE 2 DIABETES MELLITUS WITHOUT COMPLICATION, WITH LONG-TERM CURRENT USE OF INSULIN: ICD-10-CM

## 2023-10-11 RX ORDER — GLIPIZIDE 5 MG/1
10 TABLET, FILM COATED, EXTENDED RELEASE ORAL DAILY
Qty: 180 TABLET | Refills: 0 | Status: SHIPPED | OUTPATIENT
Start: 2023-10-11

## 2023-10-11 RX ORDER — SITAGLIPTIN 100 MG/1
100 TABLET, FILM COATED ORAL DAILY
Qty: 90 TABLET | Refills: 3 | Status: SHIPPED | OUTPATIENT
Start: 2023-10-11

## 2023-11-08 ENCOUNTER — OFFICE VISIT (OUTPATIENT)
Dept: FAMILY MEDICINE CLINIC | Facility: CLINIC | Age: 68
End: 2023-11-08
Payer: MEDICARE

## 2023-11-08 VITALS
BODY MASS INDEX: 30.76 KG/M2 | SYSTOLIC BLOOD PRESSURE: 132 MMHG | HEART RATE: 87 BPM | DIASTOLIC BLOOD PRESSURE: 70 MMHG | WEIGHT: 196 LBS | OXYGEN SATURATION: 96 % | HEIGHT: 67 IN

## 2023-11-08 DIAGNOSIS — M51.36 DDD (DEGENERATIVE DISC DISEASE), LUMBAR: ICD-10-CM

## 2023-11-08 DIAGNOSIS — Z00.00 MEDICARE ANNUAL WELLNESS VISIT, SUBSEQUENT: Primary | ICD-10-CM

## 2023-11-08 DIAGNOSIS — Z79.4 TYPE 2 DIABETES MELLITUS WITHOUT COMPLICATION, WITH LONG-TERM CURRENT USE OF INSULIN: ICD-10-CM

## 2023-11-08 DIAGNOSIS — E11.9 TYPE 2 DIABETES MELLITUS WITHOUT COMPLICATION, WITH LONG-TERM CURRENT USE OF INSULIN: ICD-10-CM

## 2023-11-08 DIAGNOSIS — E08.42 DIABETES MELLITUS DUE TO UNDERLYING CONDITION WITH DIABETIC POLYNEUROPATHY, WITHOUT LONG-TERM CURRENT USE OF INSULIN: ICD-10-CM

## 2023-11-08 DIAGNOSIS — M48.062 SPINAL STENOSIS OF LUMBAR REGION WITH NEUROGENIC CLAUDICATION: ICD-10-CM

## 2023-11-08 DIAGNOSIS — E03.8 OTHER SPECIFIED HYPOTHYROIDISM: ICD-10-CM

## 2023-11-08 DIAGNOSIS — E78.2 MIXED HYPERLIPIDEMIA: ICD-10-CM

## 2023-11-08 DIAGNOSIS — E11.9 ENCOUNTER FOR DIABETIC FOOT EXAM: ICD-10-CM

## 2023-11-08 DIAGNOSIS — E66.9 OBESITY (BMI 30-39.9): ICD-10-CM

## 2023-11-08 DIAGNOSIS — I10 ESSENTIAL (PRIMARY) HYPERTENSION: ICD-10-CM

## 2023-11-08 DIAGNOSIS — E11.65 TYPE 2 DIABETES MELLITUS WITH HYPERGLYCEMIA, WITHOUT LONG-TERM CURRENT USE OF INSULIN: ICD-10-CM

## 2023-11-08 DIAGNOSIS — F41.1 GAD (GENERALIZED ANXIETY DISORDER): ICD-10-CM

## 2023-11-08 RX ORDER — FLUOXETINE HYDROCHLORIDE 40 MG/1
40 CAPSULE ORAL DAILY
Qty: 90 CAPSULE | Refills: 1 | Status: SHIPPED | OUTPATIENT
Start: 2023-11-08

## 2023-11-08 NOTE — PATIENT INSTRUCTIONS
Advance Care Planning and Advance Directives     You make decisions on a daily basis - decisions about where you want to live, your career, your home, your life. Perhaps one of the most important decisions you face is your choice for future medical care. Take time to talk with your family and your healthcare team and start planning today.  Advance Care Planning is a process that can help you:  Understand possible future healthcare decisions in light of your own experiences  Reflect on those decision in light of your goals and values  Discuss your decisions with those closest to you and the healthcare professionals that care for you  Make a plan by creating a document that reflects your wishes    Surrogate Decision Maker  In the event of a medical emergency, which has left you unable to communicate or to make your own decisions, you would need someone to make decisions for you.  It is important to discuss your preferences for medical treatment with this person while you are in good health.     Qualities of a surrogate decision maker:  Willing to take on this role and responsibility  Knows what you want for future medical care  Willing to follow your wishes even if they don't agree with them  Able to make difficult medical decisions under stressful circumstances    Advance Directives  These are legal documents you can create that will guide your healthcare team and decision maker(s) when needed. These documents can be stored in the electronic medical record.    Living Will - a legal document to guide your care if you have a terminal condition or a serious illness and are unable to communicate. States vary by statute in document names/types, but most forms may include one or more of the following:        -  Directions regarding life-prolonging treatments        -  Directions regarding artificially provided nutrition/hydration        -  Choosing a healthcare decision maker        -  Direction regarding organ/tissue  donation    Durable Power of  for Healthcare - this document names an -in-fact to make medical decisions for you, but it may also allow this person to make personal and financial decisions for you. Please seek the advice of an  if you need this type of document.    **Advance Directives are not required and no one may discriminate against you if you do not sign one.    Medical Orders  Many states allow specific forms/orders signed by your physician to record your wishes for medical treatment in your current state of health. This form, signed in personal communication with your physician, addresses resuscitation and other medical interventions that you may or may not want.      For more information or to schedule a time with a [facility] Advance Care Planning Facilitator contact: [facility]/ACP or call [phone] and someone will contact you directly.Calorie Counting for Weight Loss  Calories are units of energy. Your body needs a certain number of calories from food to keep going throughout the day. When you eat or drink more calories than your body needs, your body stores the extra calories mostly as fat. When you eat or drink fewer calories than your body needs, your body burns fat to get the energy it needs.  Calorie counting means keeping track of how many calories you eat and drink each day. Calorie counting can be helpful if you need to lose weight. If you eat fewer calories than your body needs, you should lose weight. Ask your health care provider what a healthy weight is for you.  For calorie counting to work, you will need to eat the right number of calories each day to lose a healthy amount of weight per week. A dietitian can help you figure out how many calories you need in a day and will suggest ways to reach your calorie goal.  A healthy amount of weight to lose each week is usually 1-2 lb (0.5-0.9 kg). This usually means that your daily calorie intake should be  reduced by 500-750 calories.  Eating 1,200-1,500 calories a day can help most women lose weight.  Eating 1,500-1,800 calories a day can help most men lose weight.  What do I need to know about calorie counting?  Work with your health care provider or dietitian to determine how many calories you should get each day. To meet your daily calorie goal, you will need to:  Find out how many calories are in each food that you would like to eat. Try to do this before you eat.  Decide how much of the food you plan to eat.  Keep a food log. Do this by writing down what you ate and how many calories it had.  To successfully lose weight, it is important to balance calorie counting with a healthy lifestyle that includes regular activity.  Where do I find calorie information?  The number of calories in a food can be found on a Nutrition Facts label. If a food does not have a Nutrition Facts label, try to look up the calories online or ask your dietitian for help.  Remember that calories are listed per serving. If you choose to have more than one serving of a food, you will have to multiply the calories per serving by the number of servings you plan to eat. For example, the label on a package of bread might say that a serving size is 1 slice and that there are 90 calories in a serving. If you eat 1 slice, you will have eaten 90 calories. If you eat 2 slices, you will have eaten 180 calories.  How do I keep a food log?  After each time that you eat, record the following in your food log as soon as possible:  What you ate. Be sure to include toppings, sauces, and other extras on the food.  How much you ate. This can be measured in cups, ounces, or number of items.  How many calories were in each food and drink.  The total number of calories in the food you ate.  Keep your food log near you, such as in a pocket-sized notebook or on an león or website on your mobile phone. Some programs will calculate calories for you and show you how  many calories you have left to meet your daily goal.  What are some portion-control tips?  Know how many calories are in a serving. This will help you know how many servings you can have of a certain food.  Use a measuring cup to measure serving sizes. You could also try weighing out portions on a kitchen scale. With time, you will be able to estimate serving sizes for some foods.  Take time to put servings of different foods on your favorite plates or in your favorite bowls and cups so you know what a serving looks like.  Try not to eat straight from a food's packaging, such as from a bag or box. Eating straight from the package makes it hard to see how much you are eating and can lead to overeating. Put the amount you would like to eat in a cup or on a plate to make sure you are eating the right portion.  Use smaller plates, glasses, and bowls for smaller portions and to prevent overeating.  Try not to multitask. For example, avoid watching TV or using your computer while eating. If it is time to eat, sit down at a table and enjoy your food. This will help you recognize when you are full. It will also help you be more mindful of what and how much you are eating.  What are tips for following this plan?  Reading food labels  Check the calorie count compared with the serving size. The serving size may be smaller than what you are used to eating.  Check the source of the calories. Try to choose foods that are high in protein, fiber, and vitamins, and low in saturated fat, trans fat, and sodium.  Shopping  Read nutrition labels while you shop. This will help you make healthy decisions about which foods to buy.  Pay attention to nutrition labels for low-fat or fat-free foods. These foods sometimes have the same number of calories or more calories than the full-fat versions. They also often have added sugar, starch, or salt to make up for flavor that was removed with the fat.  Make a grocery list of lower-calorie foods  and stick to it.  Cooking  Try to cook your favorite foods in a healthier way. For example, try baking instead of frying.  Use low-fat dairy products.  Meal planning  Use more fruits and vegetables. One-half of your plate should be fruits and vegetables.  Include lean proteins, such as chicken, turkey, and fish.  Lifestyle  Each week, aim to do one of the followin minutes of moderate exercise, such as walking.  75 minutes of vigorous exercise, such as running.  General information  Know how many calories are in the foods you eat most often. This will help you calculate calorie counts faster.  Find a way of tracking calories that works for you. Get creative. Try different apps or programs if writing down calories does not work for you.  What foods should I eat?    Eat nutritious foods. It is better to have a nutritious, high-calorie food, such as an avocado, than a food with few nutrients, such as a bag of potato chips.  Use your calories on foods and drinks that will fill you up and will not leave you hungry soon after eating.  Examples of foods that fill you up are nuts and nut butters, vegetables, lean proteins, and high-fiber foods such as whole grains. High-fiber foods are foods with more than 5 g of fiber per serving.  Pay attention to calories in drinks. Low-calorie drinks include water and unsweetened drinks.  The items listed above may not be a complete list of foods and beverages you can eat. Contact a dietitian for more information.  What foods should I limit?  Limit foods or drinks that are not good sources of vitamins, minerals, or protein or that are high in unhealthy fats. These include:  Candy.  Other sweets.  Sodas, specialty coffee drinks, alcohol, and juice.  The items listed above may not be a complete list of foods and beverages you should avoid. Contact a dietitian for more information.  How do I count calories when eating out?  Pay attention to portions. Often, portions are much larger  when eating out. Try these tips to keep portions smaller:  Consider sharing a meal instead of getting your own.  If you get your own meal, eat only half of it. Before you start eating, ask for a container and put half of your meal into it.  When available, consider ordering smaller portions from the menu instead of full portions.  Pay attention to your food and drink choices. Knowing the way food is cooked and what is included with the meal can help you eat fewer calories.  If calories are listed on the menu, choose the lower-calorie options.  Choose dishes that include vegetables, fruits, whole grains, low-fat dairy products, and lean proteins.  Choose items that are boiled, broiled, grilled, or steamed. Avoid items that are buttered, battered, fried, or served with cream sauce. Items labeled as crispy are usually fried, unless stated otherwise.  Choose water, low-fat milk, unsweetened iced tea, or other drinks without added sugar. If you want an alcoholic beverage, choose a lower-calorie option, such as a glass of wine or light beer.  Ask for dressings, sauces, and syrups on the side. These are usually high in calories, so you should limit the amount you eat.  If you want a salad, choose a garden salad and ask for grilled meats. Avoid extra toppings such as dorsey, cheese, or fried items. Ask for the dressing on the side, or ask for olive oil and vinegar or lemon to use as dressing.  Estimate how many servings of a food you are given. Knowing serving sizes will help you be aware of how much food you are eating at restaurants.  Where to find more information  Centers for Disease Control and Prevention: www.cdc.gov  [place] Department of Agriculture: myplate.gov  Summary  Calorie counting means keeping track of how many calories you eat and drink each day. If you eat fewer calories than your body needs, you should lose weight.  A healthy amount of weight to lose per week is usually 1-2 lb (0.5-0.9 kg). This usually  means reducing your daily calorie intake by 500-750 calories.  The number of calories in a food can be found on a Nutrition Facts label. If a food does not have a Nutrition Facts label, try to look up the calories online or ask your dietitian for help.  Use smaller plates, glasses, and bowls for smaller portions and to prevent overeating.  Use your calories on foods and drinks that will fill you up and not leave you hungry shortly after a meal.  This information is not intended to replace advice given to you by your health care provider. Make sure you discuss any questions you have with your health care provider.  Document Revised: 01/28/2021 Document Reviewed: 01/28/2021  [employer] Patient Education © 2022 [employer]    Exercising to Lose Weight  Getting regular exercise is important for everyone. It is especially important if you are overweight. Being overweight increases your risk of heart disease, stroke, diabetes, high blood pressure, and several types of cancer. Exercising, and reducing the calories you consume, can help you lose weight and improve fitness and health.  Exercise can be moderate or vigorous intensity. To lose weight, most people need to do a certain amount of moderate or vigorous-intensity exercise each week.  How can exercise affect me?  You lose weight when you exercise enough to burn more calories than you eat. Exercise also reduces body fat and builds muscle. The more muscle you have, the more calories you burn. Exercise also:  Improves mood.  Reduces stress and tension.  Improves your overall fitness, flexibility, and endurance.  Increases bone strength.  Moderate-intensity exercise  Moderate-intensity exercise is any activity that gets you moving enough to burn at least three times more energy (calories) than if you were sitting.  Examples of moderate exercise include:  Walking a mile in 15 minutes.  Doing light yard work.  Biking at an easy pace.  Most people should get at least 150  minutes of moderate-intensity exercise a week to maintain their body weight.  Vigorous-intensity exercise  Vigorous-intensity exercise is any activity that gets you moving enough to burn at least six times more calories than if you were sitting. When you exercise at this intensity, you should be working hard enough that you are not able to carry on a conversation.  Examples of vigorous exercise include:  Running.  Playing a team sport, such as football, basketball, and soccer.  Jumping rope.  Most people should get at least 75 minutes a week of vigorous exercise to maintain their body weight.  What actions can I take to lose weight?  The amount of exercise you need to lose weight depends on:  Your age.  The type of exercise.  Any health conditions you have.  Your overall physical ability.  Talk to your health care provider about how much exercise you need and what types of activities are safe for you.  Nutrition    Make changes to your diet as told by your health care provider or diet and nutrition specialist (dietitian). This may include:  Eating fewer calories.  Eating more protein.  Eating less unhealthy fats.  Eating a diet that includes fresh fruits and vegetables, whole grains, low-fat dairy products, and lean protein.  Avoiding foods with added fat, salt, and sugar.  Drink plenty of water while you exercise to prevent dehydration or heat stroke.  Activity  Choose an activity that you enjoy and set realistic goals. Your health care provider can help you make an exercise plan that works for you.  Exercise at a moderate or vigorous intensity most days of the week.  The intensity of exercise may vary from person to person. You can tell how intense a workout is for you by paying attention to your breathing and heartbeat. Most people will notice their breathing and heartbeat get faster with more intense exercise.  Do resistance training twice each week, such as:  Push-ups.  Sit-ups.  Lifting weights.  Using  resistance bands.  Getting short amounts of exercise can be just as helpful as long, structured periods of exercise. If you have trouble finding time to exercise, try doing these things as part of your daily routine:  Get up, stretch, and walk around every 30 minutes throughout the day.  Go for a walk during your lunch break.  Park your car farther away from your destination.  If you take public transportation, get off one stop early and walk the rest of the way.  Make phone calls while standing up and walking around.  Take the stairs instead of elevators or escalators.  Wear comfortable clothes and shoes with good support.  Do not exercise so much that you hurt yourself, feel dizzy, or get very short of breath.  Where to find more information  [place] Department of Health and Human Services: www.hhs.gov  Centers for Disease Control and Prevention: www.cdc.gov  Contact a health care provider:  Before starting a new exercise program.  If you have questions or concerns about your weight.  If you have a medical problem that keeps you from exercising.  Get help right away if:  You have any of the following while exercising:  Injury.  Dizziness.  Difficulty breathing or shortness of breath that does not go away when you stop exercising.  Chest pain.  Rapid heartbeat.  These symptoms may represent a serious problem that is an emergency. Do not wait to see if the symptoms will go away. Get medical help right away. Call your local emergency services (911 in the [place] Do not drive yourself to the hospital.  Summary  Getting regular exercise is especially important if you are overweight.  Being overweight increases your risk of heart disease, stroke, diabetes, high blood pressure, and several types of cancer.  Losing weight happens when you burn more calories than you eat.  Reducing the amount of calories you eat, and getting regular moderate or vigorous exercise each week, helps you lose weight.  This information is not  intended to replace advice given to you by your health care provider. Make sure you discuss any questions you have with your health care provider.  Document Revised: 02/13/2022 Document Reviewed: 02/13/2022  [employer] Patient Education © 2022 [employer]

## 2023-11-08 NOTE — PROGRESS NOTES
QUICK REFERENCE INFORMATION:  The ABCs of the Annual Wellness Visit    Subsequent Medicare Wellness Visit    HEALTH RISK ASSESSMENT    1955    Recent Hospitalizations:  Recently treated at the following:  [facility].        Current Medical Providers:  Patient Care Team:  Frank Saenz DO as PCP - General (Family Medicine)        Smoking Status:  Social History     Tobacco Use   Smoking Status Former    Packs/day: 1.00    Years: 4.00    Additional pack years: 0.00    Total pack years: 4.00    Types: Cigarettes    Start date: 1973    Quit date: 1977    Years since quittin.2   Smokeless Tobacco Never   Tobacco Comments    college years [id number]       Alcohol Consumption:  Social History     Substance and Sexual Activity   Alcohol Use Yes    Alcohol/week: 2.0 standard drinks of alcohol    Types: 1 Glasses of wine, 1 Cans of beer per week    Comment: social/ wine/beer occasionally       Depression Screen:       2023    10:00 AM   PHQ-2/PHQ-9 Depression Screening   Little Interest or Pleasure in Doing Things 0-->not at all   Feeling Down, Depressed or Hopeless 0-->not at all   PHQ-9: Brief Depression Severity Measure Score 0       Health Habits and Functional and Cognitive Screenin/8/2023    10:00 AM   Functional & Cognitive Status   Do you have difficulty preparing food and eating? No   Do you have difficulty bathing yourself, getting dressed or grooming yourself? No   Do you have difficulty using the toilet? No   Do you have difficulty moving around from place to place? No   Do you have trouble with steps or getting out of a bed or a chair? No   Current Diet Well Balanced Diet   Dental Exam Up to date   Eye Exam Up to date   Exercise (times per week) 0 times per week   Current Exercises Include No Regular Exercise   Do you need help using the phone?  No   Are you deaf or do you have serious difficulty hearing?  No   Do you need help to go to places out of walking  distance? No   Do you need help shopping? No   Do you need help preparing meals?  No   Do you need help with housework?  No   Do you need help with laundry? No   Do you need help taking your medications? No   Do you need help managing money? No   Do you ever drive or ride in a car without wearing a seat belt? No   Have you felt unusual stress, anger or loneliness in the last month? No   Who do you live with? Alone   If you need help, do you have trouble finding someone available to you? No   Have you been bothered in the last four weeks by sexual problems? No   Do you have difficulty concentrating, remembering or making decisions? No           Does the patient have evidence of cognitive impairment? No    Aspirin use counseling: Does not need ASA (and currently is not on it)      Recent Lab Results:  CMP:  Lab Results   Component Value Date     (H) 03/20/2018    BUN 6 (L) 07/02/2023    CREATININE 0.65 07/02/2023    EGFRIFNONA 79 10/25/2021    EGFRIFAFRI 92 10/25/2021    BCR 9.2 07/02/2023     (L) 07/02/2023    K 3.3 (L) 07/02/2023    CO2 28.9 07/02/2023    CALCIUM 9.2 07/02/2023    PROTENTOTREF 7.0 03/14/2023    ALBUMIN 4.9 07/01/2023    LABGLOBREF 2.6 03/14/2023    LABIL2 1.7 03/14/2023    BILITOT 0.9 07/01/2023    ALKPHOS 59 07/01/2023    AST 56 (H) 07/01/2023    ALT 91 (H) 07/01/2023     Lipid Panel:  Lab Results   Component Value Date    TRIG 425 (H) 03/14/2023    HDL 41 03/14/2023    VLDL 83 (H) 03/14/2023     HbA1c:  Lab Results   Component Value Date    HGBA1C 6.90 (H) 06/13/2023       Visual Acuity:  No results found.    Age-appropriate Screening Schedule:  Refer to the list below for future screening recommendations based on patient's age, sex and/or medical conditions. Orders for these recommended tests are listed in the plan section. The patient has been provided with a written plan.    Health Maintenance   Topic Date Due    DXA SCAN  Never done    COVID-19 Vaccine (3 - 2023-24 season)  09/01/2023    INFLUENZA VACCINE  03/31/2024 (Originally 8/1/2023)    Pneumococcal Vaccine 65+ (3 - PPSV23 or PCV20) 12/02/2023    HEMOGLOBIN A1C  12/13/2023    LIPID PANEL  03/14/2024    URINE MICROALBUMIN  03/14/2024    MAMMOGRAM  08/23/2024    DIABETIC EYE EXAM  09/13/2024    BMI FOLLOWUP  10/06/2024    ANNUAL WELLNESS VISIT  11/08/2024    DIABETIC FOOT EXAM  11/08/2024    COLORECTAL CANCER SCREENING  02/01/2026    TDAP/TD VACCINES (2 - Td or Tdap) 09/08/2027    HEPATITIS C SCREENING  Addressed    PAP SMEAR  Discontinued        Subjective   History of Present Illness    Jennie Lofton is a 68 y.o. female who presents for an Subsequent Wellness Visit.    The following portions of the patient's history were reviewed and updated as appropriate: allergies, current medications, past family history, past medical history, past social history, past surgical history, and problem list.    Outpatient Medications Prior to Visit   Medication Sig Dispense Refill    Accu-Chek Softclix Lancets lancets USE ONE LANCET TO TEST DAILY 100 each 11    Cholecalciferol (VITAMIN D3) 125 MCG (5000 UT) capsule capsule TAKE ONE CAPSULE BY MOUTH DAILY 90 capsule 0    FLUoxetine (PROzac) 20 MG capsule Take 1 capsule by mouth Daily. 90 capsule 3    glipizide (GLUCOTROL XL) 5 MG ER tablet TAKE TWO TABLETS BY MOUTH DAILY 180 tablet 0    glucose blood (Accu-Chek Maddison Plus) test strip 1 each by Other route Daily. 100 each 3    Insulin Glargine, 1 Unit Dial, (TOUJEO) 300 UNIT/ML solution pen-injector injection 10 units nightly, increase by 2 units weekly until FBS <150 max 40 units/24 hours 7.5 mL 3    Insulin Pen Needle (Pen Needles) 32G X 4 MM misc 1 application Daily. 90 each 3    Januvia 100 MG tablet TAKE 1 TABLET DAILY 90 tablet 3    levothyroxine (SYNTHROID, LEVOTHROID) 137 MCG tablet TAKE 1 TABLET BY MOUTH DAILY 90 tablet 0    lisinopril (PRINIVIL,ZESTRIL) 20 MG tablet TAKE ONE TABLET BY MOUTH DAILY 90 tablet 3    Multiple  Vitamins-Minerals (PRESERVISION AREDS 2 PO) Take  by mouth.      ondansetron ODT (ZOFRAN-ODT) 4 MG disintegrating tablet Place 1 tablet on the tongue Every 8 (Eight) Hours As Needed for Vomiting. 30 tablet 0    cephalexin (Keflex) 500 MG capsule Take 1 capsule by mouth 3 (Three) Times a Day. (Patient not taking: Reported on 11/8/2023) 14 capsule 0     No facility-administered medications prior to visit.       Patient Active Problem List   Diagnosis    Intractable vomiting with nausea    Lactic acidosis    Type 2 diabetes mellitus without complication    Hyponatremia    Hypothyroid    Essential hypertension    Anxiety    Breast mass    Breast pain    Luetscher's syndrome    DM II (diabetes mellitus, type II), controlled    Encounter for screening colonoscopy    History of breast cancer    Syncope    UTI (urinary tract infection)    Arthritis of right hip    Status post right hip replacement    Right sided sciatica    Chronic SI joint pain    Dysuria       Advance Care Planning:  ACP discussion was held with the patient during this visit. Patient does not have an advance directive, information provided.    Identification of Risk Factors:  Risk factors include: Obesity/Overweight .    Review of Systems   Respiratory:  Negative for shortness of breath.    Cardiovascular:  Negative for chest pain, palpitations, orthopnea and PND.   Musculoskeletal:  Positive for back pain.   Psychiatric/Behavioral:  The patient is not nervous/anxious.        Compared to one year ago, the patient feels her physical health is the same.  Compared to one year ago, the patient feels her mental health is the same.    Objective     Physical Exam  Vitals and nursing note reviewed.   Constitutional:       Appearance: Normal appearance. She is not toxic-appearing or diaphoretic.   HENT:      Head: Normocephalic and atraumatic.   Musculoskeletal:      Cervical back: Neck supple.   Feet:      Comments: Diabetic foot exam and monofilament completed,  "see scanned report.        Skin:     General: Skin is warm and dry.      Capillary Refill: Capillary refill takes less than 2 seconds.   Neurological:      Mental Status: She is alert.   Psychiatric:         Mood and Affect: Mood normal.         Behavior: Behavior normal.         Vitals:    11/08/23 1032   BP: 132/70   Pulse: 87   SpO2: 96%   Weight: 88.9 kg (196 lb)   Height: 170.2 cm (67\")     Body mass index is 30.7 kg/m².           Assessment & Plan   Patient Self-Management and Personalized Health Advice  The patient has been provided with information about: diet and exercise and preventive services including:   Annual Wellness Visit (AWV).    Visit Diagnoses:    ICD-10-CM ICD-9-CM   1. Medicare annual wellness visit, subsequent  Z00.00 V70.0   2. Type 2 diabetes mellitus without complication, with long-term current use of insulin  E11.9 250.00    Z79.4 V58.67   3. Diabetes mellitus due to underlying condition with diabetic polyneuropathy, without long-term current use of insulin  E08.42 249.60     357.2   4. Essential (primary) hypertension  I10 401.9   5. Other specified hypothyroidism  E03.8 244.8   6. Obesity (BMI 30-39.9)  E66.9 278.00   7. Mixed hyperlipidemia  E78.2 272.2   8. Spinal stenosis of lumbar region with neurogenic claudication  M48.062 724.03   9. DDD (degenerative disc disease), lumbar  M51.36 722.52   10. Encounter for diabetic foot exam  E11.9 250.00       Orders Placed This Encounter   Procedures    Caudal or Epidural, Single Injection     Scheduling Instructions:      L4- L5 level    Microalbumin / Creatinine Urine Ratio - Urine, Clean Catch     Order Specific Question:   Release to patient     Answer:   Routine Release [[id number]]    Lipid Panel     Order Specific Question:   Release to patient     Answer:   Routine Release [[id number]]    Comprehensive Metabolic Panel     Order Specific Question:   Release to patient     Answer:   Routine Release [[id number]]    Hemoglobin A1c     " Order Specific Question:   Release to patient     Answer:   Routine Release [[id number]]    TSH     Order Specific Question:   Release to patient     Answer:   Routine Release [[id number]]       Outpatient Encounter Medications as of 11/8/2023   Medication Sig Dispense Refill    Accu-Chek Softclix Lancets lancets USE ONE LANCET TO TEST DAILY 100 each 11    Cholecalciferol (VITAMIN D3) 125 MCG (5000 UT) capsule capsule TAKE ONE CAPSULE BY MOUTH DAILY 90 capsule 0    FLUoxetine (PROzac) 20 MG capsule Take 1 capsule by mouth Daily. 90 capsule 3    glipizide (GLUCOTROL XL) 5 MG ER tablet TAKE TWO TABLETS BY MOUTH DAILY 180 tablet 0    glucose blood (Accu-Chek Maddison Plus) test strip 1 each by Other route Daily. 100 each 3    Insulin Glargine, 1 Unit Dial, (TOUJEO) 300 UNIT/ML solution pen-injector injection 10 units nightly, increase by 2 units weekly until FBS <150 max 40 units/24 hours 7.5 mL 3    Insulin Pen Needle (Pen Needles) 32G X 4 MM misc 1 application Daily. 90 each 3    Januvia 100 MG tablet TAKE 1 TABLET DAILY 90 tablet 3    levothyroxine (SYNTHROID, LEVOTHROID) 137 MCG tablet TAKE 1 TABLET BY MOUTH DAILY 90 tablet 0    lisinopril (PRINIVIL,ZESTRIL) 20 MG tablet TAKE ONE TABLET BY MOUTH DAILY 90 tablet 3    Multiple Vitamins-Minerals (PRESERVISION AREDS 2 PO) Take  by mouth.      ondansetron ODT (ZOFRAN-ODT) 4 MG disintegrating tablet Place 1 tablet on the tongue Every 8 (Eight) Hours As Needed for Vomiting. 30 tablet 0    [DISCONTINUED] cephalexin (Keflex) 500 MG capsule Take 1 capsule by mouth 3 (Three) Times a Day. (Patient not taking: Reported on 11/8/2023) 14 capsule 0     No facility-administered encounter medications on file as of 11/8/2023.       Reviewed use of high risk medication in the elderly: yes  Reviewed for potential of harmful drug interactions in the elderly: yes    Follow Up:  Return in about 3 months (around 2/8/2024), or if symptoms worsen or fail to improve.     An After Visit Summary  and PPPS with all of these plans were given to the patient.           ++++++++++++++++++++++++++++++++++++++++++++++++++++++++++++++++++     Chief Complaint   Patient presents with    Diabetes    Medicare Wellness-subsequent    Anxiety    Hypothyroidism    Hypertension     Diabetes  She presents for her follow-up diabetic visit. She has type 2 diabetes mellitus. Her disease course has been fluctuating. Pertinent negatives for hypoglycemia include no nervousness/anxiousness. Associated symptoms include foot paresthesias. Pertinent negatives for diabetes include no chest pain. Diabetic complications include peripheral neuropathy.   Anxiety  Presents for follow-up visit. Patient reports no chest pain, depressed mood, excessive worry, nervous/anxious behavior, palpitations or shortness of breath.     Compliance with medications is %.   Hypothyroidism  This is a chronic problem. The current episode started more than 1 year ago. Pertinent negatives include no chest pain. Treatments tried: on replacement.   Hypertension  This is a chronic problem. The current episode started more than 1 year ago. The problem is unchanged. Associated symptoms include anxiety. Pertinent negatives include no chest pain, orthopnea, palpitations, peripheral edema, PND or shortness of breath. The current treatment provides moderate improvement.   Back Pain  This is a chronic problem. The current episode started more than 1 year ago. The problem occurs constantly. Pertinent negatives include no chest pain. Treatments tried: will start PT;  katey bettye legs at times.     Lost Mom and  this year    Review of Systems   Cardiovascular:  Negative for chest pain, orthopnea, palpitations and paroxysmal nocturnal dyspnea.   Respiratory:  Negative for shortness of breath.    Musculoskeletal:  Positive for back pain.   Psychiatric/Behavioral:  The patient is not nervous/anxious.        Social History     Tobacco Use    Smoking status: Former      "Packs/day: 1.00     Years: 4.00     Additional pack years: 0.00     Total pack years: 4.00     Types: Cigarettes     Start date: 1973     Quit date: 1977     Years since quittin.2    Smokeless tobacco: Never    Tobacco comments:     college years [id number]   Substance Use Topics    Alcohol use: Yes     Alcohol/week: 2.0 standard drinks of alcohol     Types: 1 Glasses of wine, 1 Cans of beer per week     Comment: social/ wine/beer occasionally     O:   Vitals:    23 1032   BP: 132/70   Pulse: 87   SpO2: 96%   Weight: 88.9 kg (196 lb)   Height: 170.2 cm (67\")     Body mass index is 30.7 kg/m².  Vitals and nursing note reviewed.   Constitutional:       Appearance: Normal appearance. Not toxic-appearing or diaphoretic.   HENT:      Head: Normocephalic and atraumatic.   Musculoskeletal:      Cervical back: Neck supple. Skin:     General: Skin is warm and dry.      Capillary Refill: Capillary refill takes less than 2 seconds.   Feet:      Comments: Diabetic foot exam and monofilament completed, see scanned report.        Neurological:      Mental Status: Alert.   Psychiatric:         Mood and Affect: Mood normal.         Behavior: Behavior normal.       Noninvasive physiologic studies of upper/lower extremity arteries, single level, bilateral (eg, ankle/brachial indices, Doppler waveform analysis...)    Accession Number: [id number]   Date of Study: 23   Ordering Provider: Frank Saenz DO   Clinical Indications: claudication        Interpreting Physicians  Performing Staff   Virgil Knight Jr., MD Tech: Daniela Caal RVT        Clinical Indication    claudication   Dx: Claudication [I73.9 (ICD-10-CM)]     Interpretation Summary         Right Conclusion: The right NICOLE is normal. Normal digital pressures.    Left Conclusion: The left NICOLE is normal. Normal digital pressures.           Diagnoses and all orders for this visit:    1. Medicare annual wellness visit, subsequent " (Primary)    2. Type 2 diabetes mellitus without complication, with long-term current use of insulin  -     Microalbumin / Creatinine Urine Ratio - Urine, Clean Catch  -     Lipid Panel  -     Comprehensive Metabolic Panel  -     Hemoglobin A1c  -     TSH    3. Diabetes mellitus due to underlying condition with diabetic polyneuropathy, without long-term current use of insulin    4. Essential (primary) hypertension  -     Comprehensive Metabolic Panel    5. Other specified hypothyroidism  -     TSH    6. Obesity (BMI 30-39.9)    7. Mixed hyperlipidemia  -     Lipid Panel  -     Comprehensive Metabolic Panel    8. Spinal stenosis of lumbar region with neurogenic claudication  -     Caudal or Epidural, Single Injection    9. DDD (degenerative disc disease), lumbar  -     Caudal or Epidural, Single Injection    10. Encounter for diabetic foot exam    Dm2 - stop glipizide and will go from 10 units to 20 units on toujeo;  increase by 2 units until fbs <150;   call if lows  -hypothyroidism - continue medication, recheck thyroid labs.  -hypertension - controlled, continue medications  -HLD - continue statin, recheck lipids.  -will try JORDIN for back and start PT as planned  -diabetic neuropathies - work on blood sugar control    Return in about 3 months (around 2/8/2024), or if symptoms worsen or fail to improve.

## 2023-11-09 LAB
ALBUMIN SERPL-MCNC: 5 G/DL (ref 3.5–5.2)
ALBUMIN/CREAT UR: <7 MG/G CREAT (ref 0–29)
ALBUMIN/GLOB SERPL: 2.5 G/DL
ALP SERPL-CCNC: 66 U/L (ref 39–117)
ALT SERPL-CCNC: 16 U/L (ref 1–33)
AST SERPL-CCNC: 15 U/L (ref 1–32)
BILIRUB SERPL-MCNC: 0.3 MG/DL (ref 0–1.2)
BUN SERPL-MCNC: 13 MG/DL (ref 8–23)
BUN/CREAT SERPL: 20 (ref 7–25)
CALCIUM SERPL-MCNC: 10.5 MG/DL (ref 8.6–10.5)
CHLORIDE SERPL-SCNC: 97 MMOL/L (ref 98–107)
CHOLEST SERPL-MCNC: 245 MG/DL (ref 0–200)
CO2 SERPL-SCNC: 30 MMOL/L (ref 22–29)
CREAT SERPL-MCNC: 0.65 MG/DL (ref 0.57–1)
CREAT UR-MCNC: 44.1 MG/DL
EGFRCR SERPLBLD CKD-EPI 2021: 96 ML/MIN/1.73
GLOBULIN SER CALC-MCNC: 2 GM/DL
GLUCOSE SERPL-MCNC: 105 MG/DL (ref 65–99)
HBA1C MFR BLD: 5.9 % (ref 4.8–5.6)
HDLC SERPL-MCNC: 73 MG/DL (ref 40–60)
LDLC SERPL CALC-MCNC: 145 MG/DL (ref 0–100)
MICROALBUMIN UR-MCNC: <3 UG/ML
POTASSIUM SERPL-SCNC: 5.2 MMOL/L (ref 3.5–5.2)
PROT SERPL-MCNC: 7 G/DL (ref 6–8.5)
SODIUM SERPL-SCNC: 135 MMOL/L (ref 136–145)
TRIGL SERPL-MCNC: 151 MG/DL (ref 0–150)
TSH SERPL DL<=0.005 MIU/L-ACNC: 0.48 UIU/ML (ref 0.27–4.2)
VLDLC SERPL CALC-MCNC: 27 MG/DL (ref 5–40)

## 2023-11-12 NOTE — PROGRESS NOTES
Call results to patient.  Diabetes has greatly improved and now well controlled  Cholesterol high, I would try atorovastatin 20mg 1 po daily  Thyroid appropriate range  Kidney and liver function normal

## 2023-11-13 ENCOUNTER — TELEPHONE (OUTPATIENT)
Dept: FAMILY MEDICINE CLINIC | Facility: CLINIC | Age: 68
End: 2023-11-13
Payer: MEDICARE

## 2023-11-15 ENCOUNTER — TELEPHONE (OUTPATIENT)
Dept: FAMILY MEDICINE CLINIC | Facility: CLINIC | Age: 68
End: 2023-11-15
Payer: MEDICARE

## 2023-11-15 NOTE — TELEPHONE ENCOUNTER
We have a referral but no order to schedule. Please place a new order for [id number] - CERVICAL THORACIC LUMBAR OR COCCYX EPIDURAL BLOCK. Thank you

## 2023-11-16 DIAGNOSIS — M51.36 DDD (DEGENERATIVE DISC DISEASE), LUMBAR: ICD-10-CM

## 2023-11-16 DIAGNOSIS — M48.062 SPINAL STENOSIS OF LUMBAR REGION WITH NEUROGENIC CLAUDICATION: Primary | ICD-10-CM

## 2023-12-29 DIAGNOSIS — E11.65 TYPE 2 DIABETES MELLITUS WITH HYPERGLYCEMIA, WITHOUT LONG-TERM CURRENT USE OF INSULIN: ICD-10-CM

## 2023-12-29 RX ORDER — LANCETS
EACH MISCELLANEOUS
Qty: 100 EACH | Refills: 11 | Status: SHIPPED | OUTPATIENT
Start: 2023-12-29

## 2024-01-19 DIAGNOSIS — E03.8 OTHER SPECIFIED HYPOTHYROIDISM: ICD-10-CM

## 2024-01-19 RX ORDER — LEVOTHYROXINE SODIUM 137 UG/1
TABLET ORAL
Qty: 90 TABLET | Refills: 0 | Status: SHIPPED | OUTPATIENT
Start: 2024-01-19

## 2024-02-29 DIAGNOSIS — E11.9 TYPE 2 DIABETES MELLITUS WITHOUT COMPLICATION, WITH LONG-TERM CURRENT USE OF INSULIN: ICD-10-CM

## 2024-02-29 DIAGNOSIS — Z79.4 TYPE 2 DIABETES MELLITUS WITHOUT COMPLICATION, WITH LONG-TERM CURRENT USE OF INSULIN: ICD-10-CM

## 2024-02-29 RX ORDER — GLIPIZIDE 5 MG/1
10 TABLET, FILM COATED, EXTENDED RELEASE ORAL DAILY
Qty: 180 TABLET | Refills: 0 | OUTPATIENT
Start: 2024-02-29

## 2024-03-05 DIAGNOSIS — E11.9 TYPE 2 DIABETES MELLITUS WITHOUT COMPLICATION, WITH LONG-TERM CURRENT USE OF INSULIN: ICD-10-CM

## 2024-03-05 DIAGNOSIS — Z79.4 TYPE 2 DIABETES MELLITUS WITHOUT COMPLICATION, WITH LONG-TERM CURRENT USE OF INSULIN: ICD-10-CM

## 2024-03-05 RX ORDER — GLIPIZIDE 5 MG/1
10 TABLET, FILM COATED, EXTENDED RELEASE ORAL DAILY
Qty: 180 TABLET | Refills: 0 | OUTPATIENT
Start: 2024-03-05

## 2024-03-22 DIAGNOSIS — E11.65 TYPE 2 DIABETES MELLITUS WITH HYPERGLYCEMIA, WITHOUT LONG-TERM CURRENT USE OF INSULIN: ICD-10-CM

## 2024-03-22 DIAGNOSIS — E03.8 OTHER SPECIFIED HYPOTHYROIDISM: ICD-10-CM

## 2024-03-22 RX ORDER — INSULIN GLARGINE 300 U/ML
INJECTION, SOLUTION SUBCUTANEOUS
Qty: 4.5 ML | Refills: 9 | Status: SHIPPED | OUTPATIENT
Start: 2024-03-22

## 2024-03-22 RX ORDER — LEVOTHYROXINE SODIUM 137 UG/1
TABLET ORAL
Qty: 30 TABLET | Refills: 2 | Status: SHIPPED | OUTPATIENT
Start: 2024-03-22

## 2024-05-07 ENCOUNTER — OFFICE VISIT (OUTPATIENT)
Dept: FAMILY MEDICINE CLINIC | Facility: CLINIC | Age: 69
End: 2024-05-07
Payer: MEDICARE

## 2024-05-07 VITALS
HEIGHT: 67 IN | HEART RATE: 87 BPM | DIASTOLIC BLOOD PRESSURE: 60 MMHG | OXYGEN SATURATION: 96 % | SYSTOLIC BLOOD PRESSURE: 124 MMHG | WEIGHT: 201 LBS | BODY MASS INDEX: 31.55 KG/M2

## 2024-05-07 DIAGNOSIS — E11.9 TYPE 2 DIABETES MELLITUS WITHOUT COMPLICATION, WITH LONG-TERM CURRENT USE OF INSULIN: Primary | ICD-10-CM

## 2024-05-07 DIAGNOSIS — E55.9 VITAMIN D DEFICIENCY: ICD-10-CM

## 2024-05-07 DIAGNOSIS — E03.8 OTHER SPECIFIED HYPOTHYROIDISM: ICD-10-CM

## 2024-05-07 DIAGNOSIS — I10 ESSENTIAL HYPERTENSION: ICD-10-CM

## 2024-05-07 DIAGNOSIS — Z79.4 TYPE 2 DIABETES MELLITUS WITHOUT COMPLICATION, WITH LONG-TERM CURRENT USE OF INSULIN: Primary | ICD-10-CM

## 2024-05-07 PROCEDURE — 99214 OFFICE O/P EST MOD 30 MIN: CPT | Performed by: FAMILY MEDICINE

## 2024-05-07 PROCEDURE — 1159F MED LIST DOCD IN RCRD: CPT | Performed by: FAMILY MEDICINE

## 2024-05-07 PROCEDURE — 1160F RVW MEDS BY RX/DR IN RCRD: CPT | Performed by: FAMILY MEDICINE

## 2024-05-07 PROCEDURE — 3074F SYST BP LT 130 MM HG: CPT | Performed by: FAMILY MEDICINE

## 2024-05-07 PROCEDURE — 3078F DIAST BP <80 MM HG: CPT | Performed by: FAMILY MEDICINE

## 2024-05-07 PROCEDURE — 1126F AMNT PAIN NOTED NONE PRSNT: CPT | Performed by: FAMILY MEDICINE

## 2024-05-08 LAB
25(OH)D3+25(OH)D2 SERPL-MCNC: 54.1 NG/ML (ref 30–100)
ALBUMIN SERPL-MCNC: 4.5 G/DL (ref 3.9–4.9)
ALBUMIN/GLOB SERPL: 1.9 {RATIO} (ref 1.2–2.2)
ALP SERPL-CCNC: 75 IU/L (ref 44–121)
ALT SERPL-CCNC: 17 IU/L (ref 0–32)
AST SERPL-CCNC: 14 IU/L (ref 0–40)
BILIRUB SERPL-MCNC: 0.4 MG/DL (ref 0–1.2)
BUN SERPL-MCNC: 15 MG/DL (ref 8–27)
BUN/CREAT SERPL: 20 (ref 12–28)
CALCIUM SERPL-MCNC: 9.9 MG/DL (ref 8.7–10.3)
CHLORIDE SERPL-SCNC: 94 MMOL/L (ref 96–106)
CHOLEST SERPL-MCNC: 246 MG/DL (ref 100–199)
CO2 SERPL-SCNC: 25 MMOL/L (ref 20–29)
CREAT SERPL-MCNC: 0.75 MG/DL (ref 0.57–1)
EGFRCR SERPLBLD CKD-EPI 2021: 86 ML/MIN/1.73
GLOBULIN SER CALC-MCNC: 2.4 G/DL (ref 1.5–4.5)
GLUCOSE SERPL-MCNC: 120 MG/DL (ref 70–99)
HBA1C MFR BLD: 8.3 % (ref 4.8–5.6)
HDLC SERPL-MCNC: 53 MG/DL
LDLC SERPL CALC-MCNC: 156 MG/DL (ref 0–99)
POTASSIUM SERPL-SCNC: 4.7 MMOL/L (ref 3.5–5.2)
PROT SERPL-MCNC: 6.9 G/DL (ref 6–8.5)
SODIUM SERPL-SCNC: 132 MMOL/L (ref 134–144)
TRIGL SERPL-MCNC: 203 MG/DL (ref 0–149)
TSH SERPL DL<=0.005 MIU/L-ACNC: 0.37 UIU/ML (ref 0.45–4.5)
VLDLC SERPL CALC-MCNC: 37 MG/DL (ref 5–40)

## 2024-05-11 DIAGNOSIS — F41.1 GAD (GENERALIZED ANXIETY DISORDER): ICD-10-CM

## 2024-05-13 DIAGNOSIS — E11.65 TYPE 2 DIABETES MELLITUS WITH HYPERGLYCEMIA, WITHOUT LONG-TERM CURRENT USE OF INSULIN: ICD-10-CM

## 2024-05-13 RX ORDER — FLUOXETINE HYDROCHLORIDE 40 MG/1
40 CAPSULE ORAL DAILY
Qty: 90 CAPSULE | Refills: 1 | Status: SHIPPED | OUTPATIENT
Start: 2024-05-13

## 2024-05-13 RX ORDER — LEVOTHYROXINE SODIUM 0.12 MG/1
125 TABLET ORAL DAILY
Qty: 30 TABLET | Refills: 1 | Status: SHIPPED | OUTPATIENT
Start: 2024-05-13

## 2024-05-13 RX ORDER — ROSUVASTATIN CALCIUM 5 MG/1
5 TABLET, COATED ORAL DAILY
Qty: 90 TABLET | Refills: 3 | Status: SHIPPED | OUTPATIENT
Start: 2024-05-13

## 2024-05-13 RX ORDER — INSULIN GLARGINE 300 U/ML
25 INJECTION, SOLUTION SUBCUTANEOUS NIGHTLY
Qty: 8 ML | Refills: 1 | Status: SHIPPED | OUTPATIENT
Start: 2024-05-13

## 2024-07-12 LAB — TSH SERPL DL<=0.005 MIU/L-ACNC: 0.13 UIU/ML (ref 0.27–4.2)

## 2024-07-15 DIAGNOSIS — E03.8 OTHER SPECIFIED HYPOTHYROIDISM: Primary | ICD-10-CM

## 2024-07-15 RX ORDER — LEVOTHYROXINE SODIUM 0.1 MG/1
100 TABLET ORAL DAILY
Qty: 30 TABLET | Refills: 2 | Status: SHIPPED | OUTPATIENT
Start: 2024-07-15

## 2024-08-22 DIAGNOSIS — I10 ESSENTIAL HYPERTENSION: ICD-10-CM

## 2024-08-22 DIAGNOSIS — E03.8 OTHER SPECIFIED HYPOTHYROIDISM: Primary | ICD-10-CM

## 2024-08-22 LAB
ALBUMIN SERPL-MCNC: 4.6 G/DL (ref 3.5–5.2)
ALBUMIN/GLOB SERPL: 2.2 G/DL
ALP SERPL-CCNC: 69 U/L (ref 39–117)
ALT SERPL-CCNC: 49 U/L (ref 1–33)
AST SERPL-CCNC: 21 U/L (ref 1–32)
BILIRUB SERPL-MCNC: 0.4 MG/DL (ref 0–1.2)
BUN SERPL-MCNC: 13 MG/DL (ref 8–23)
BUN/CREAT SERPL: 15.3 (ref 7–25)
CALCIUM SERPL-MCNC: 9.7 MG/DL (ref 8.6–10.5)
CHLORIDE SERPL-SCNC: 90 MMOL/L (ref 98–107)
CO2 SERPL-SCNC: 29.7 MMOL/L (ref 22–29)
CREAT SERPL-MCNC: 0.85 MG/DL (ref 0.57–1)
EGFRCR SERPLBLD CKD-EPI 2021: 74.3 ML/MIN/1.73
ERYTHROCYTE [DISTWIDTH] IN BLOOD BY AUTOMATED COUNT: 12.7 % (ref 12.3–15.4)
GLOBULIN SER CALC-MCNC: 2.1 GM/DL
GLUCOSE SERPL-MCNC: 293 MG/DL (ref 65–99)
HCT VFR BLD AUTO: 43.2 % (ref 34–46.6)
HGB BLD-MCNC: 14.6 G/DL (ref 12–15.9)
MCH RBC QN AUTO: 32 PG (ref 26.6–33)
MCHC RBC AUTO-ENTMCNC: 33.8 G/DL (ref 31.5–35.7)
MCV RBC AUTO: 94.7 FL (ref 79–97)
PLATELET # BLD AUTO: 318 10*3/MM3 (ref 140–450)
POTASSIUM SERPL-SCNC: 4.2 MMOL/L (ref 3.5–5.2)
PROT SERPL-MCNC: 6.7 G/DL (ref 6–8.5)
RBC # BLD AUTO: 4.56 10*6/MM3 (ref 3.77–5.28)
SODIUM SERPL-SCNC: 130 MMOL/L (ref 136–145)
TSH SERPL DL<=0.005 MIU/L-ACNC: 18.1 UIU/ML (ref 0.27–4.2)
WBC # BLD AUTO: 7.7 10*3/MM3 (ref 3.4–10.8)

## 2024-08-26 NOTE — PROGRESS NOTES
Call results to patient.  Thyroid now underactive, any missed doses?   If no missed doses, go back up but to 112mcg 1 po daily since 125mcg appears to be too much;   Send #30 2 ref.  Sodium down further, though blood sugar also very high, which will cause false low readings.  Blood sugars running too high lately?

## 2024-08-28 DIAGNOSIS — E11.65 TYPE 2 DIABETES MELLITUS WITH HYPERGLYCEMIA, WITHOUT LONG-TERM CURRENT USE OF INSULIN: ICD-10-CM

## 2024-08-28 RX ORDER — PEN NEEDLE, DIABETIC 32GX 5/32"
NEEDLE, DISPOSABLE MISCELLANEOUS
Qty: 90 EACH | Refills: 1 | Status: SHIPPED | OUTPATIENT
Start: 2024-08-28

## 2024-08-29 ENCOUNTER — OFFICE VISIT (OUTPATIENT)
Dept: FAMILY MEDICINE CLINIC | Facility: CLINIC | Age: 69
End: 2024-08-29
Payer: MEDICARE

## 2024-08-29 VITALS
DIASTOLIC BLOOD PRESSURE: 62 MMHG | SYSTOLIC BLOOD PRESSURE: 118 MMHG | BODY MASS INDEX: 29.03 KG/M2 | WEIGHT: 185 LBS | HEIGHT: 67 IN | HEART RATE: 81 BPM | OXYGEN SATURATION: 98 %

## 2024-08-29 DIAGNOSIS — E87.1 HYPOSMOLALITY: ICD-10-CM

## 2024-08-29 DIAGNOSIS — Z09 HOSPITAL DISCHARGE FOLLOW-UP: Primary | ICD-10-CM

## 2024-08-29 DIAGNOSIS — E11.65 TYPE 2 DIABETES MELLITUS WITH HYPERGLYCEMIA, WITHOUT LONG-TERM CURRENT USE OF INSULIN: ICD-10-CM

## 2024-08-29 DIAGNOSIS — R11.0 NAUSEA: ICD-10-CM

## 2024-08-29 DIAGNOSIS — E87.1 HYPONATREMIA: ICD-10-CM

## 2024-08-29 PROBLEM — E87.20 LACTIC ACIDOSIS: Status: RESOLVED | Noted: 2018-08-05 | Resolved: 2024-08-29

## 2024-08-29 PROBLEM — R11.2 INTRACTABLE VOMITING WITH NAUSEA: Status: RESOLVED | Noted: 2018-08-05 | Resolved: 2024-08-29

## 2024-08-29 PROBLEM — N39.0 UTI (URINARY TRACT INFECTION): Status: RESOLVED | Noted: 2018-03-18 | Resolved: 2024-08-29

## 2024-08-29 PROBLEM — R55 SYNCOPE: Status: RESOLVED | Noted: 2018-03-18 | Resolved: 2024-08-29

## 2024-08-29 PROBLEM — R30.0 DYSURIA: Status: RESOLVED | Noted: 2023-07-01 | Resolved: 2024-08-29

## 2024-08-29 PROCEDURE — 1159F MED LIST DOCD IN RCRD: CPT

## 2024-08-29 PROCEDURE — 3078F DIAST BP <80 MM HG: CPT

## 2024-08-29 PROCEDURE — 3074F SYST BP LT 130 MM HG: CPT

## 2024-08-29 PROCEDURE — 1126F AMNT PAIN NOTED NONE PRSNT: CPT

## 2024-08-29 PROCEDURE — 1160F RVW MEDS BY RX/DR IN RCRD: CPT

## 2024-08-29 PROCEDURE — 99214 OFFICE O/P EST MOD 30 MIN: CPT

## 2024-08-29 PROCEDURE — 3052F HG A1C>EQUAL 8.0%<EQUAL 9.0%: CPT

## 2024-08-29 RX ORDER — INSULIN GLARGINE 300 U/ML
20 INJECTION, SOLUTION SUBCUTANEOUS NIGHTLY
Qty: 6 ML | Refills: 0 | Status: SHIPPED | OUTPATIENT
Start: 2024-08-29

## 2024-08-29 RX ORDER — ONDANSETRON 4 MG/1
4 TABLET, ORALLY DISINTEGRATING ORAL EVERY 8 HOURS PRN
Qty: 30 TABLET | Refills: 0 | Status: SHIPPED | OUTPATIENT
Start: 2024-08-29

## 2024-08-29 NOTE — PROGRESS NOTES
Subjective   Jennie Lofton is a 69 y.o. female.     Chief Complaint   Patient presents with    Hospital Follow Up Visit     Diabetic ketoacidosis     History of Present Illness     Hospital f/u, hyponatremia, hyposmolality, T2DM: Patient was hospitalized on 8/16 for hyponatremia and hypo-osmolality following a few day history of nausea and vomiting.  She followed up with nephrology yesterday, and states that she has been doing well and feeling much better.  The in-hospital provider thought that her Prozac was contributing to her hyponatremia, but patient does not want to completely stop.  She opts to taper to 20 mg and recheck in 1 month with nephrology to see if improved.  She states that her blood sugars have been variable at present, but seem to be stabilizing.  She is taking 20 units of Toujeo as well as 100 mg Januvia.  She has been waking up most mornings with nausea and taking Zofran, which alleviates it.    The following portions of the patient's history were reviewed and updated as appropriate: allergies, current medications, past family history, past medical history, past social history, past surgical history and problem list.    Review of Systems   Denies dizziness, fatigue, fever/chills, CP, SOA, headache, blood in urine/stool, abd pain, leg swelling.    Objective     Vitals:    08/29/24 1327   BP: 118/62   Pulse: 81   SpO2: 98%      Body mass index is 28.98 kg/m².    Physical Exam  Vitals reviewed.   Constitutional:       General: She is not in acute distress.     Appearance: Normal appearance. She is not ill-appearing or toxic-appearing.   HENT:      Right Ear: External ear normal.      Left Ear: External ear normal.      Nose: No congestion or rhinorrhea.      Mouth/Throat:      Mouth: Mucous membranes are moist.      Pharynx: Oropharynx is clear.   Eyes:      Conjunctiva/sclera: Conjunctivae normal.   Cardiovascular:      Rate and Rhythm: Normal rate and regular rhythm.      Pulses: Normal  pulses.      Heart sounds: Normal heart sounds.   Pulmonary:      Effort: Pulmonary effort is normal. No respiratory distress.      Breath sounds: Normal breath sounds.   Abdominal:      General: Bowel sounds are normal.      Palpations: Abdomen is soft.      Tenderness: There is no abdominal tenderness.   Skin:     General: Skin is warm and dry.      Capillary Refill: Capillary refill takes less than 2 seconds.   Neurological:      General: No focal deficit present.      Mental Status: She is alert and oriented to person, place, and time.      Motor: No weakness.   Psychiatric:         Behavior: Behavior normal.       Assessment & Plan   Diagnoses and all orders for this visit:    1. Hospital discharge follow-up (Primary)    2. Hyponatremia    3. Hyposmolality    4. Type 2 diabetes mellitus with hyperglycemia, without long-term current use of insulin  -     Insulin Glargine, 1 Unit Dial, (Toujeo SoloStar) 300 UNIT/ML solution pen-injector injection; Inject 20 Units under the skin into the appropriate area as directed Every Night.  Dispense: 6 mL; Refill: 0    5. Nausea  -     ondansetron ODT (ZOFRAN-ODT) 4 MG disintegrating tablet; Place 1 tablet on the tongue Every 8 (Eight) Hours As Needed for Vomiting.  Dispense: 30 tablet; Refill: 0      Plan:     Gave patient instructions regarding tapering Prozac to 20 mg.  I suspect that the hyponatremia was primarily caused by multiday history of nausea and vomiting, but will recheck in 1 month.  If not improved, will consider switching to alternative therapy, non-SSRI.  Follow-up for next scheduled appointment, EVARISTO.    Discussed Care Gaps, ordered referrals and encouraged vaccination updates.       - Pt agrees with plan of care and denies further questions/concerns today  - This document is intended for medical expert use only. Persons  reading this document without medical staff guidance may result in misinterpretation and unintended morbidity     Go to the ER for any  possible life-threatening symptoms such as chest pain or shortness of air.      Please allow 3-5 business days for recommendations based on new results      I personally spent time with this patient, preparing for the visit, reviewing tests, obtaining and/or reviewing a separately obtained history, performing a medically appropriate examination and/or evaluation, counseling and educating the patient/family/caregiver, ordering medications,  documenting information in the medical record and indepentently interpreting results.

## 2024-08-29 NOTE — PATIENT INSTRUCTIONS

## 2024-10-01 DIAGNOSIS — E08.42 DIABETES MELLITUS DUE TO UNDERLYING CONDITION WITH DIABETIC POLYNEUROPATHY, WITHOUT LONG-TERM CURRENT USE OF INSULIN: ICD-10-CM

## 2024-10-02 RX ORDER — SITAGLIPTIN 100 MG/1
100 TABLET, FILM COATED ORAL DAILY
Qty: 90 TABLET | Refills: 3 | Status: SHIPPED | OUTPATIENT
Start: 2024-10-02

## 2024-10-17 DIAGNOSIS — I10 ESSENTIAL (PRIMARY) HYPERTENSION: ICD-10-CM

## 2024-10-17 DIAGNOSIS — E11.9 TYPE 2 DIABETES MELLITUS WITHOUT COMPLICATION, WITHOUT LONG-TERM CURRENT USE OF INSULIN: ICD-10-CM

## 2024-10-17 RX ORDER — LISINOPRIL 20 MG/1
20 TABLET ORAL DAILY
Qty: 90 TABLET | Refills: 0 | Status: SHIPPED | OUTPATIENT
Start: 2024-10-17

## 2024-10-18 LAB — TSH SERPL DL<=0.005 MIU/L-ACNC: 6.47 UIU/ML (ref 0.27–4.2)

## 2024-10-21 DIAGNOSIS — E03.8 OTHER SPECIFIED HYPOTHYROIDISM: Primary | ICD-10-CM

## 2024-10-21 RX ORDER — LEVOTHYROXINE SODIUM 112 UG/1
112 TABLET ORAL DAILY
Qty: 30 TABLET | Refills: 1 | Status: SHIPPED | OUTPATIENT
Start: 2024-10-21

## 2024-10-21 RX ORDER — BLOOD SUGAR DIAGNOSTIC
1 STRIP MISCELLANEOUS DAILY
Qty: 100 EACH | Refills: 3 | Status: SHIPPED | OUTPATIENT
Start: 2024-10-21

## 2024-11-12 ENCOUNTER — OFFICE VISIT (OUTPATIENT)
Dept: FAMILY MEDICINE CLINIC | Facility: CLINIC | Age: 69
End: 2024-11-12
Payer: MEDICARE

## 2024-11-12 VITALS
BODY MASS INDEX: 28.72 KG/M2 | HEART RATE: 74 BPM | WEIGHT: 183 LBS | DIASTOLIC BLOOD PRESSURE: 76 MMHG | SYSTOLIC BLOOD PRESSURE: 112 MMHG | OXYGEN SATURATION: 95 % | HEIGHT: 67 IN

## 2024-11-12 DIAGNOSIS — E11.9 TYPE 2 DIABETES MELLITUS WITHOUT COMPLICATION, WITHOUT LONG-TERM CURRENT USE OF INSULIN: ICD-10-CM

## 2024-11-12 DIAGNOSIS — Z78.0 MENOPAUSE: ICD-10-CM

## 2024-11-12 DIAGNOSIS — E66.3 OVERWEIGHT (BMI 25.0-29.9): ICD-10-CM

## 2024-11-12 DIAGNOSIS — I10 ESSENTIAL (PRIMARY) HYPERTENSION: ICD-10-CM

## 2024-11-12 DIAGNOSIS — E55.9 VITAMIN D DEFICIENCY: ICD-10-CM

## 2024-11-12 DIAGNOSIS — Z12.31 ENCOUNTER FOR SCREENING MAMMOGRAM FOR BREAST CANCER: ICD-10-CM

## 2024-11-12 DIAGNOSIS — E78.2 MIXED HYPERLIPIDEMIA: ICD-10-CM

## 2024-11-12 DIAGNOSIS — E03.8 OTHER SPECIFIED HYPOTHYROIDISM: ICD-10-CM

## 2024-11-12 DIAGNOSIS — Z00.00 MEDICARE ANNUAL WELLNESS VISIT, SUBSEQUENT: Primary | ICD-10-CM

## 2024-11-12 DIAGNOSIS — E11.9 ENCOUNTER FOR DIABETIC FOOT EXAM: ICD-10-CM

## 2024-11-12 PROCEDURE — 1126F AMNT PAIN NOTED NONE PRSNT: CPT | Performed by: FAMILY MEDICINE

## 2024-11-12 PROCEDURE — 3074F SYST BP LT 130 MM HG: CPT | Performed by: FAMILY MEDICINE

## 2024-11-12 PROCEDURE — 1170F FXNL STATUS ASSESSED: CPT | Performed by: FAMILY MEDICINE

## 2024-11-12 PROCEDURE — 99214 OFFICE O/P EST MOD 30 MIN: CPT | Performed by: FAMILY MEDICINE

## 2024-11-12 PROCEDURE — 3052F HG A1C>EQUAL 8.0%<EQUAL 9.0%: CPT | Performed by: FAMILY MEDICINE

## 2024-11-12 PROCEDURE — 3078F DIAST BP <80 MM HG: CPT | Performed by: FAMILY MEDICINE

## 2024-11-12 PROCEDURE — 1160F RVW MEDS BY RX/DR IN RCRD: CPT | Performed by: FAMILY MEDICINE

## 2024-11-12 PROCEDURE — G0439 PPPS, SUBSEQ VISIT: HCPCS | Performed by: FAMILY MEDICINE

## 2024-11-12 PROCEDURE — 1159F MED LIST DOCD IN RCRD: CPT | Performed by: FAMILY MEDICINE

## 2024-11-12 NOTE — PROGRESS NOTES
QUICK REFERENCE INFORMATION:  The ABCs of the Annual Wellness Visit    Subsequent Medicare Wellness Visit    HEALTH RISK ASSESSMENT    1955  Jennie Lofton is a 69 y.o. female who presents for an subsequent Wellness Visit.    The following portions of the patient's history were reviewed and   updated as appropriate: allergies, current medications, past family history, past medical history, past social history, past surgical history, and problem list.    Compared to one year ago, the patient feels his physical   health is the same.    Compared to one year ago, the patient feels his mental   health is the same.    Recent Hospitalizations:  She was admitted within the past 365 days at [facility].     Current Medical Providers:  Patient Care Team:  Frank Saenz DO as PCP - General (Family Medicine)  Chencho Barrow MD as Consulting Physician (Nephrology)    I reviewed list of current Medical providers and suppliers with patient and are listed above to the best of the patient's and my knowledge.    Smoking Status:  Social History     Tobacco Use   Smoking Status Former    Current packs/day: 0.00    Average packs/day: 1 pack/day for 4.0 years (4.0 ttl pk-yrs)    Types: Cigarettes    Start date: 1973    Quit date: 1977    Years since quittin.2    Passive exposure: Past   Smokeless Tobacco Never   Tobacco Comments    college years [id number]       Alcohol Consumption:  Social History     Substance and Sexual Activity   Alcohol Use Yes    Alcohol/week: 2.0 standard drinks of alcohol    Types: 1 Glasses of wine, 1 Cans of beer per week    Comment: social/ wine/beer occasionally       Depression Screen:       2024    10:00 AM   PHQ-2/PHQ-9 Depression Screening   Little interest or pleasure in doing things Not at all   Feeling down, depressed, or hopeless Not at all       Health Habits and Functional and Cognitive Screenin/12/2024    10:00 AM   Functional & Cognitive Status   Do  you have difficulty preparing food and eating? No   Do you have difficulty bathing yourself, getting dressed or grooming yourself? No   Do you have difficulty using the toilet? No   Do you have difficulty moving around from place to place? No   Do you have trouble with steps or getting out of a bed or a chair? No   Current Diet Well Balanced Diet   Dental Exam Up to date   Eye Exam Up to date   Exercise (times per week) 2 times per week   Current Exercises Include House Cleaning   Do you need help using the phone?  No   Are you deaf or do you have serious difficulty hearing?  No   Do you need help to go to places out of walking distance? No   Do you need help shopping? No   Do you need help preparing meals?  No   Do you need help with housework?  No   Do you need help with laundry? No   Do you need help taking your medications? No   Do you need help managing money? No   Do you ever drive or ride in a car without wearing a seat belt? No   Have you felt unusual stress, anger or loneliness in the last month? No   Who do you live with? Alone   If you need help, do you have trouble finding someone available to you? No   Have you been bothered in the last four weeks by sexual problems? No   Do you have difficulty concentrating, remembering or making decisions? No       Does the patient have evidence of cognitive impairment? No    Aspirin use counseling: Does not need ASA (and currently is not on it)    Recent Lab Results:  CMP:  Lab Results   Component Value Date    Glucose 293 (H) 08/22/2024    Glucose 146 (H) 07/02/2023    Glucose 134 (H) 03/20/2018    Glucose 118 (H) 03/20/2018    Glucose, UA Negative 07/01/2023    Glucose, Urine Negative 03/18/2018    BUN 13 08/22/2024    BUN 6 (L) 07/02/2023    BUN 13 03/20/2018    BUN/Creatinine Ratio 15.3 08/22/2024    BUN/Creatinine Ratio 9.2 07/02/2023    BUN/Creatinine Ratio 18.6 03/20/2018    Creatinine 0.85 08/22/2024    Creatinine 0.65 07/02/2023    Creatinine 0.7 03/20/2018  "   Creatinine, Urine 44.1 11/08/2023    Creatinine, Urine 200 08/20/2018    Ketones, UA 80 mg/dL (3+) (A) 07/01/2023    CO2 28.9 07/02/2023    Total CO2 29.7 (H) 08/22/2024    Calcium 9.7 08/22/2024    Calcium 9.2 07/02/2023    Calcium 9.1 03/20/2018    Albumin 4.6 08/22/2024    Albumin 4.9 07/01/2023    Albumin 4.6 03/18/2018    AST (SGOT) 21 08/22/2024    AST (SGOT) 56 (H) 07/01/2023    AST (SGOT) 35 03/18/2018    ALT (SGPT) 49 (H) 08/22/2024    ALT (SGPT) 91 (H) 07/01/2023    ALT (SGPT) 37 03/18/2018     Lipid Panel:  Lab Results   Component Value Date    Total Cholesterol 246 (H) 05/07/2024    Triglycerides 203 (H) 05/07/2024    HDL Cholesterol 53 05/07/2024    VLDL Cholesterol 27.6 07/15/2019    VLDL Cholesterol Garrick 37 05/07/2024     HbA1c:  No components found for: \"HGBA1C\"    Visual Acuity:  No results found.    Age-appropriate Screening Schedule:  Refer to the list below for future screening recommendations based on patient's age, sex and/or medical conditions. Orders for these recommended tests are listed in the plan section. The patient has been provided with a written plan.    Health Maintenance   Topic Date Due    DXA SCAN  Never done    HEMOGLOBIN A1C  11/07/2024    MAMMOGRAM  08/23/2024    COVID-19 Vaccine (3 - 2024-25 season) 11/14/2024 (Originally 9/1/2024)    INFLUENZA VACCINE  03/31/2025 (Originally 8/1/2024)    LIPID PANEL  05/07/2025    DIABETIC EYE EXAM  06/14/2025    ANNUAL WELLNESS VISIT  11/12/2025    DIABETIC FOOT EXAM  11/12/2025    BMI FOLLOWUP  11/12/2025    COLORECTAL CANCER SCREENING  02/01/2026    TDAP/TD VACCINES (2 - Td or Tdap) 09/08/2027    Pneumococcal Vaccine 65+  Completed    HEPATITIS C SCREENING  Addressed    PAP SMEAR  Discontinued    URINE MICROALBUMIN  Discontinued          Outpatient Medications Prior to Visit   Medication Sig Dispense Refill    Accu-Chek Softclix Lancets lancets USE TO TEST BLOOD SUGAR ONCE DAILY 100 each 11    Cholecalciferol (VITAMIN D3) 125 MCG (5000 " UT) capsule capsule TAKE ONE CAPSULE BY MOUTH DAILY 90 capsule 0    FLUoxetine (PROzac) 40 MG capsule TAKE 1 CAPSULE BY MOUTH DAILY 90 capsule 1    glucose blood (Accu-Chek Maddison Plus) test strip 1 each by Other route Daily. 100 each 3    Insulin Glargine, 1 Unit Dial, (Toujeo SoloStar) 300 UNIT/ML solution pen-injector injection Inject 20 Units under the skin into the appropriate area as directed Every Night. 6 mL 0    Insulin Pen Needle (BD Pen Needle Dianne 2nd Gen) 32G X 4 MM misc USE 1 PEN NEEDLE DAILY 90 each 1    Januvia 100 MG tablet TAKE 1 TABLET DAILY 90 tablet 3    levothyroxine (Synthroid) 112 MCG tablet Take 1 tablet by mouth Daily. 30 tablet 1    lisinopril (PRINIVIL,ZESTRIL) 20 MG tablet TAKE 1 TABLET BY MOUTH DAILY 90 tablet 0    Multiple Vitamins-Minerals (PRESERVISION AREDS 2 PO) Take  by mouth.      ondansetron ODT (ZOFRAN-ODT) 4 MG disintegrating tablet Place 1 tablet on the tongue Every 8 (Eight) Hours As Needed for Vomiting. 30 tablet 0    rosuvastatin (Crestor) 5 MG tablet Take 1 tablet by mouth Daily. 90 tablet 3     No facility-administered medications prior to visit.       Patient Active Problem List   Diagnosis    Type 2 diabetes mellitus without complication    Hyponatremia    Hypothyroid    Essential hypertension    Anxiety    Luetscher's syndrome    Encounter for screening colonoscopy    History of breast cancer    Arthritis of right hip    Status post right hip replacement    Right sided sciatica    Chronic SI joint pain       Advance Care Planning:  ACP discussion was held with the patient during this visit. Patient does not have an advance directive, information provided.    Identification of Risk Factors:  Risk factors include: Obesity/Overweight .    Review of Systems   Respiratory:  Negative for shortness of breath.    Cardiovascular:  Negative for chest pain, palpitations, orthopnea and PND.       Objective     Physical Exam  Vitals and nursing note reviewed.   Constitutional:       " Appearance: She is well-developed.   HENT:      Head: Normocephalic and atraumatic.   Neck:      Thyroid: No thyromegaly.      Vascular: No JVD.   Cardiovascular:      Rate and Rhythm: Normal rate and regular rhythm.      Heart sounds: Normal heart sounds. No murmur heard.     No friction rub. No gallop.   Pulmonary:      Effort: Pulmonary effort is normal. No respiratory distress.      Breath sounds: Normal breath sounds. No wheezing or rales.   Abdominal:      General: Bowel sounds are normal. There is no distension.      Palpations: Abdomen is soft.      Tenderness: There is no abdominal tenderness. There is no guarding or rebound.   Musculoskeletal:      Cervical back: Neck supple.   Feet:      Comments: Diabetic foot exam and monofilament completed, see scanned report.        Skin:     General: Skin is warm and dry.   Neurological:      Mental Status: She is alert.      Gait: Gait normal.   Psychiatric:         Behavior: Behavior normal.         Vitals:    11/12/24 1047   BP: 112/76   Pulse: 74   SpO2: 95%   Weight: 83 kg (183 lb)   Height: 170.2 cm (67\")   PainSc: 0-No pain     Body mass index is 28.66 kg/m².    BMI is >= 25 and <30. (Overweight) The following options were offered after discussion;: weight loss educational material (shared in after visit summary)      Assessment & Plan   Patient Self-Management and Personalized Health Advice  The patient has been provided with information about: diet and exercise and preventive services including:   Annual Wellness Visit (AWV).    Visit Diagnoses:    ICD-10-CM ICD-9-CM   1. Medicare annual wellness visit, subsequent  Z00.00 V70.0   2. Type 2 diabetes mellitus without complication, without long-term current use of insulin  E11.9 250.00   3. Essential (primary) hypertension  I10 401.9   4. Other specified hypothyroidism  E03.8 244.8   5. Vitamin D deficiency  E55.9 268.9   6. Mixed hyperlipidemia  E78.2 272.2   7. Overweight (BMI 25.0-29.9)  E66.3 278.02   8. " Encounter for diabetic foot exam  E11.9 250.00   9. Encounter for screening mammogram for breast cancer  Z12.31 V76.12   10. Menopause  Z78.0 627.2       Orders Placed This Encounter   Procedures    DEXA Bone Density Axial     Standing Status:   Future     Standing Expiration Date:   11/12/2025     Scheduling Instructions:      Have done Ricky Rome, was dxp;   same day as mammo     Order Specific Question:   Reason for Exam:     Answer:   osteoporosis screening, postmenopause     Order Specific Question:   Release to patient     Answer:   Routine Release [[id number]]     Order Specific Question:   Does this patient have a diabetic monitoring/medication delivering device on?     Answer:   No     Order Specific Question:   Is patient taking or have taken long term Glucocorticoid (steroids)?     Answer:   No     Order Specific Question:   Does the patient have rheumatoid arthritis?     Answer:   No     Order Specific Question:   Does the patient have secondary osteoporosis?     Answer:   No    Mammo Screening Digital Tomosynthesis Bilateral With CAD     Standing Status:   Future     Standing Expiration Date:   11/12/2025     Scheduling Instructions:      Have done Ricky Rome, was dxp;   same day as dexa     Order Specific Question:   Reason for Exam:     Answer:   breast cancer screening     Order Specific Question:   Release to patient     Answer:   Routine Release [[id number]]    Microalbumin / Creatinine Urine Ratio - Urine, Clean Catch     Order Specific Question:   Release to patient     Answer:   Routine Release [[id number]]    Lipid Panel     Order Specific Question:   Release to patient     Answer:   Routine Release [[id number]]    Comprehensive Metabolic Panel     Order Specific Question:   Release to patient     Answer:   Routine Release [[id number]]    Hemoglobin A1c     Order Specific Question:   Release to patient     Answer:   Routine Release [[id number]]    TSH     Order Specific Question:    Release to patient     Answer:   Routine Release [[id number]]    Vitamin D,25-Hydroxy     Order Specific Question:   Release to patient     Answer:   Routine Release [[id number]]       Outpatient Encounter Medications as of 11/12/2024   Medication Sig Dispense Refill    Accu-Chek Softclix Lancets lancets USE TO TEST BLOOD SUGAR ONCE DAILY 100 each 11    Cholecalciferol (VITAMIN D3) 125 MCG (5000 UT) capsule capsule TAKE ONE CAPSULE BY MOUTH DAILY 90 capsule 0    FLUoxetine (PROzac) 40 MG capsule TAKE 1 CAPSULE BY MOUTH DAILY 90 capsule 1    glucose blood (Accu-Chek Maddison Plus) test strip 1 each by Other route Daily. 100 each 3    Insulin Glargine, 1 Unit Dial, (Toujeo SoloStar) 300 UNIT/ML solution pen-injector injection Inject 20 Units under the skin into the appropriate area as directed Every Night. 6 mL 0    Insulin Pen Needle (BD Pen Needle Dianne 2nd Gen) 32G X 4 MM misc USE 1 PEN NEEDLE DAILY 90 each 1    Januvia 100 MG tablet TAKE 1 TABLET DAILY 90 tablet 3    levothyroxine (Synthroid) 112 MCG tablet Take 1 tablet by mouth Daily. 30 tablet 1    lisinopril (PRINIVIL,ZESTRIL) 20 MG tablet TAKE 1 TABLET BY MOUTH DAILY 90 tablet 0    Multiple Vitamins-Minerals (PRESERVISION AREDS 2 PO) Take  by mouth.      ondansetron ODT (ZOFRAN-ODT) 4 MG disintegrating tablet Place 1 tablet on the tongue Every 8 (Eight) Hours As Needed for Vomiting. 30 tablet 0    rosuvastatin (Crestor) 5 MG tablet Take 1 tablet by mouth Daily. 90 tablet 3     No facility-administered encounter medications on file as of 11/12/2024.       Reviewed use of high risk medication in the elderly: yes  Reviewed for potential of harmful drug interactions in the elderly: yes  No opioid medication identified on active medication list. I have reviewed chart for other potential  high risk medication/s and harmful drug interactions in the elderly.    Social History     Socioeconomic History    Marital status:    Tobacco Use    Smoking status: Former      Current packs/day: 0.00     Average packs/day: 1 pack/day for 4.0 years (4.0 ttl pk-yrs)     Types: Cigarettes     Start date: 1973     Quit date: 1977     Years since quittin.2     Passive exposure: Past    Smokeless tobacco: Never    Tobacco comments:     college years [id number]   Vaping Use    Vaping status: Former   Substance and Sexual Activity    Alcohol use: Yes     Alcohol/week: 2.0 standard drinks of alcohol     Types: 1 Glasses of wine, 1 Cans of beer per week     Comment: social/ wine/beer occasionally    Drug use: No    Sexual activity: Not Currently     Partners: Male     Birth control/protection: None     Patient was screened for OUD and AMALIA risk factors.  Jennie Lofton's pain level was assessed as well today.  I included a review current recommendations on pain management, best use practices, current CDC guidelines, alternatives to opioids including OTC & Rx topicals, non-opioid oral medications (eg nsaids, acetominophen) and life style interventions such as yoga, saul chi, stretching, regular exercise, PT/OT and referral to specialty care like Pain Management that specialize in pain treatment when appropriate.   I also included a review of serious risks of opioid use and substance use disorder.  I have included all of this in the after visit summary along with other risk factors identified that are pertinent to the patient today.      Follow Up:  Return in about 6 months (around 2025), or if symptoms worsen or fail to improve.     An After Visit Summary and PPPS with all of these plans were given to the patient.           ++++++++++++++++++++++++++++++++++++++++++++++++++++++++++++++++++   Additional E&M Note during same encounter follows:  Patient has multiple medical problems which are significant and separately identifiable that require additional work above and beyond the Medicare Wellness Visit.   Chief Complaint   Patient presents with    Diabetes    Medicare  "Wellness-subsequent    Hyperlipidemia    Hypertension    Hypothyroidism     Diabetes  Her disease course has been stable. Pertinent negatives for diabetes include no chest pain.   Hyperlipidemia  This is a chronic problem. The problem is controlled. Recent lipid tests were reviewed and are normal. Exacerbating diseases include diabetes and hypothyroidism. Pertinent negatives include no chest pain or shortness of breath. Current antihyperlipidemic treatment includes statins. The current treatment provides moderate improvement of lipids.   Hypertension  This is a chronic problem. The problem is unchanged. The problem is controlled. Pertinent negatives include no chest pain, orthopnea, palpitations, PND or shortness of breath. The current treatment provides moderate improvement.   Hypothyroidism  Presents for follow-up visit. Patient reports no palpitations. Her past medical history is significant for diabetes and hyperlipidemia.   Additional thyroid disorder comments include: Will be due recheck TSH in 2 weeks, recently adjusted dose      Review of Systems   Cardiovascular:  Negative for chest pain, orthopnea, palpitations and paroxysmal nocturnal dyspnea.   Respiratory:  Negative for shortness of breath.        Social History     Tobacco Use    Smoking status: Former     Current packs/day: 0.00     Average packs/day: 1 pack/day for 4.0 years (4.0 ttl pk-yrs)     Types: Cigarettes     Start date: 1973     Quit date: 1977     Years since quittin.2     Passive exposure: Past    Smokeless tobacco: Never    Tobacco comments:     college years [id number]   Substance Use Topics    Alcohol use: Yes     Alcohol/week: 2.0 standard drinks of alcohol     Types: 1 Glasses of wine, 1 Cans of beer per week     Comment: social/ wine/beer occasionally     O:   Vitals:    24 1047   BP: 112/76   Pulse: 74   SpO2: 95%   Weight: 83 kg (183 lb)   Height: 170.2 cm (67\")   PainSc: 0-No pain     Body mass index is 28.66 " kg/m².  Vitals and nursing note reviewed.   Constitutional:       Appearance: Well-developed.   HENT:      Head: Normocephalic and atraumatic.   Neck:      Thyroid: No thyromegaly.      Vascular: No JVD.   Pulmonary:      Effort: Pulmonary effort is normal. No respiratory distress.      Breath sounds: Normal breath sounds. No wheezing. No rales.   Cardiovascular:      Normal rate. Regular rhythm. Normal heart sounds.      No gallop.  No friction rub.   Abdominal:      General: Bowel sounds are normal. There is no distension.      Palpations: Abdomen is soft.      Tenderness: There is no abdominal tenderness. There is no guarding or rebound.   Musculoskeletal:      Cervical back: Neck supple. Skin:     General: Skin is warm and dry.   Feet:      Comments: Diabetic foot exam and monofilament completed, see scanned report.        Neurological:      Mental Status: Alert.      Gait: Gait normal.   Psychiatric:         Behavior: Behavior normal.         Diagnoses and all orders for this visit:    1. Medicare annual wellness visit, subsequent (Primary)    2. Type 2 diabetes mellitus without complication, without long-term current use of insulin  -     Microalbumin / Creatinine Urine Ratio - Urine, Clean Catch  -     Lipid Panel  -     Comprehensive Metabolic Panel  -     Hemoglobin A1c    3. Essential (primary) hypertension    4. Other specified hypothyroidism  -     TSH    5. Vitamin D deficiency  -     Vitamin D,25-Hydroxy    6. Mixed hyperlipidemia  -     Lipid Panel  -     Comprehensive Metabolic Panel    7. Overweight (BMI 25.0-29.9)    8. Encounter for diabetic foot exam    9. Encounter for screening mammogram for breast cancer  -     Mammo Screening Digital Tomosynthesis Bilateral With CAD; Future    10. Menopause  -     DEXA Bone Density Axial; Future    -dm2 - continue medications, recheck labs  -hypertension - controlled, continue medications  -HLD - continue statin, recheck lipids.  -hypothyroidism - continue  medication, recheck thyroid labs.  -due vitamin D check  -due dexa/mammo    Return in about 6 months (around 5/12/2025), or if symptoms worsen or fail to improve.

## 2024-11-12 NOTE — PATIENT INSTRUCTIONS
Advance Care Planning and Advance Directives     You make decisions on a daily basis - decisions about where you want to live, your career, your home, your life. Perhaps one of the most important decisions you face is your choice for future medical care. Take time to talk with your family and your healthcare team and start planning today.  Advance Care Planning is a process that can help you:  Understand possible future healthcare decisions in light of your own experiences  Reflect on those decision in light of your goals and values  Discuss your decisions with those closest to you and the healthcare professionals that care for you  Make a plan by creating a document that reflects your wishes    Surrogate Decision Maker  In the event of a medical emergency, which has left you unable to communicate or to make your own decisions, you would need someone to make decisions for you.  It is important to discuss your preferences for medical treatment with this person while you are in good health.     Qualities of a surrogate decision maker:  Willing to take on this role and responsibility  Knows what you want for future medical care  Willing to follow your wishes even if they don't agree with them  Able to make difficult medical decisions under stressful circumstances    Advance Directives  These are legal documents you can create that will guide your healthcare team and decision maker(s) when needed. These documents can be stored in the electronic medical record.    Living Will - a legal document to guide your care if you have a terminal condition or a serious illness and are unable to communicate. States vary by statute in document names/types, but most forms may include one or more of the following:        -  Directions regarding life-prolonging treatments        -  Directions regarding artificially provided nutrition/hydration        -  Choosing a healthcare decision maker        -  Direction regarding organ/tissue  donation    Durable Power of  for Healthcare - this document names an -in-fact to make medical decisions for you, but it may also allow this person to make personal and financial decisions for you. Please seek the advice of an  if you need this type of document.    **Advance Directives are not required and no one may discriminate against you if you do not sign one.    Medical Orders  Many states allow specific forms/orders signed by your physician to record your wishes for medical treatment in your current state of health. This form, signed in personal communication with your physician, addresses resuscitation and other medical interventions that you may or may not want.      For more information or to schedule a time with a [facility] Advance Care Planning Facilitator contact: [facility] or call [phone] and someone will contact you directly.Calorie Counting for Weight Loss  Calories are units of energy. Your body needs a certain number of calories from food to keep going throughout the day. When you eat or drink more calories than your body needs, your body stores the extra calories mostly as fat. When you eat or drink fewer calories than your body needs, your body burns fat to get the energy it needs.  Calorie counting means keeping track of how many calories you eat and drink each day. Calorie counting can be helpful if you need to lose weight. If you eat fewer calories than your body needs, you should lose weight. Ask your health care provider what a healthy weight is for you.  For calorie counting to work, you will need to eat the right number of calories each day to lose a healthy amount of weight per week. A dietitian can help you figure out how many calories you need in a day and will suggest ways to reach your calorie goal.  A healthy amount of weight to lose each week is usually 1-2 lb (0.5-0.9 kg). This usually means that your daily calorie intake should be  reduced by 500-750 calories.  Eating 1,200-1,500 calories a day can help most women lose weight.  Eating 1,500-1,800 calories a day can help most men lose weight.  What do I need to know about calorie counting?  Work with your health care provider or dietitian to determine how many calories you should get each day. To meet your daily calorie goal, you will need to:  Find out how many calories are in each food that you would like to eat. Try to do this before you eat.  Decide how much of the food you plan to eat.  Keep a food log. Do this by writing down what you ate and how many calories it had.  To successfully lose weight, it is important to balance calorie counting with a healthy lifestyle that includes regular activity.  Where do I find calorie information?  The number of calories in a food can be found on a Nutrition Facts label. If a food does not have a Nutrition Facts label, try to look up the calories online or ask your dietitian for help.  Remember that calories are listed per serving. If you choose to have more than one serving of a food, you will have to multiply the calories per serving by the number of servings you plan to eat. For example, the label on a package of bread might say that a serving size is 1 slice and that there are 90 calories in a serving. If you eat 1 slice, you will have eaten 90 calories. If you eat 2 slices, you will have eaten 180 calories.  How do I keep a food log?  After each time that you eat, record the following in your food log as soon as possible:  What you ate. Be sure to include toppings, sauces, and other extras on the food.  How much you ate. This can be measured in cups, ounces, or number of items.  How many calories were in each food and drink.  The total number of calories in the food you ate.  Keep your food log near you, such as in a pocket-sized notebook or on an león or website on your mobile phone. Some programs will calculate calories for you and show you how  many calories you have left to meet your daily goal.  What are some portion-control tips?  Know how many calories are in a serving. This will help you know how many servings you can have of a certain food.  Use a measuring cup to measure serving sizes. You could also try weighing out portions on a kitchen scale. With time, you will be able to estimate serving sizes for some foods.  Take time to put servings of different foods on your favorite plates or in your favorite bowls and cups so you know what a serving looks like.  Try not to eat straight from a food's packaging, such as from a bag or box. Eating straight from the package makes it hard to see how much you are eating and can lead to overeating. Put the amount you would like to eat in a cup or on a plate to make sure you are eating the right portion.  Use smaller plates, glasses, and bowls for smaller portions and to prevent overeating.  Try not to multitask. For example, avoid watching TV or using your computer while eating. If it is time to eat, sit down at a table and enjoy your food. This will help you recognize when you are full. It will also help you be more mindful of what and how much you are eating.  What are tips for following this plan?  Reading food labels  Check the calorie count compared with the serving size. The serving size may be smaller than what you are used to eating.  Check the source of the calories. Try to choose foods that are high in protein, fiber, and vitamins, and low in saturated fat, trans fat, and sodium.  Shopping  Read nutrition labels while you shop. This will help you make healthy decisions about which foods to buy.  Pay attention to nutrition labels for low-fat or fat-free foods. These foods sometimes have the same number of calories or more calories than the full-fat versions. They also often have added sugar, starch, or salt to make up for flavor that was removed with the fat.  Make a grocery list of lower-calorie foods  and stick to it.  Cooking  Try to cook your favorite foods in a healthier way. For example, try baking instead of frying.  Use low-fat dairy products.  Meal planning  Use more fruits and vegetables. One-half of your plate should be fruits and vegetables.  Include lean proteins, such as chicken, turkey, and fish.  Lifestyle  Each week, aim to do one of the followin minutes of moderate exercise, such as walking.  75 minutes of vigorous exercise, such as running.  General information  Know how many calories are in the foods you eat most often. This will help you calculate calorie counts faster.  Find a way of tracking calories that works for you. Get creative. Try different apps or programs if writing down calories does not work for you.  What foods should I eat?    Eat nutritious foods. It is better to have a nutritious, high-calorie food, such as an avocado, than a food with few nutrients, such as a bag of potato chips.  Use your calories on foods and drinks that will fill you up and will not leave you hungry soon after eating.  Examples of foods that fill you up are nuts and nut butters, vegetables, lean proteins, and high-fiber foods such as whole grains. High-fiber foods are foods with more than 5 g of fiber per serving.  Pay attention to calories in drinks. Low-calorie drinks include water and unsweetened drinks.  The items listed above may not be a complete list of foods and beverages you can eat. Contact a dietitian for more information.  What foods should I limit?  Limit foods or drinks that are not good sources of vitamins, minerals, or protein or that are high in unhealthy fats. These include:  Candy.  Other sweets.  Sodas, specialty coffee drinks, alcohol, and juice.  The items listed above may not be a complete list of foods and beverages you should avoid. Contact a dietitian for more information.  How do I count calories when eating out?  Pay attention to portions. Often, portions are much larger  when eating out. Try these tips to keep portions smaller:  Consider sharing a meal instead of getting your own.  If you get your own meal, eat only half of it. Before you start eating, ask for a container and put half of your meal into it.  When available, consider ordering smaller portions from the menu instead of full portions.  Pay attention to your food and drink choices. Knowing the way food is cooked and what is included with the meal can help you eat fewer calories.  If calories are listed on the menu, choose the lower-calorie options.  Choose dishes that include vegetables, fruits, whole grains, low-fat dairy products, and lean proteins.  Choose items that are boiled, broiled, grilled, or steamed. Avoid items that are buttered, battered, fried, or served with cream sauce. Items labeled as crispy are usually fried, unless stated otherwise.  Choose water, low-fat milk, unsweetened iced tea, or other drinks without added sugar. If you want an alcoholic beverage, choose a lower-calorie option, such as a glass of wine or light beer.  Ask for dressings, sauces, and syrups on the side. These are usually high in calories, so you should limit the amount you eat.  If you want a salad, choose a garden salad and ask for grilled meats. Avoid extra toppings such as dorsey, cheese, or fried items. Ask for the dressing on the side, or ask for olive oil and vinegar or lemon to use as dressing.  Estimate how many servings of a food you are given. Knowing serving sizes will help you be aware of how much food you are eating at restaurants.  Where to find more information  Centers for Disease Control and Prevention: www.cdc.gov  [place] Department of Agriculture: myplate.gov  Summary  Calorie counting means keeping track of how many calories you eat and drink each day. If you eat fewer calories than your body needs, you should lose weight.  A healthy amount of weight to lose per week is usually 1-2 lb (0.5-0.9 kg). This usually  means reducing your daily calorie intake by 500-750 calories.  The number of calories in a food can be found on a Nutrition Facts label. If a food does not have a Nutrition Facts label, try to look up the calories online or ask your dietitian for help.  Use smaller plates, glasses, and bowls for smaller portions and to prevent overeating.  Use your calories on foods and drinks that will fill you up and not leave you hungry shortly after a meal.  This information is not intended to replace advice given to you by your health care provider. Make sure you discuss any questions you have with your health care provider.  Document Revised: 01/28/2021 Document Reviewed: 01/28/2021  [employer] Patient Education © 2022 [employer]    Exercising to Lose Weight  Getting regular exercise is important for everyone. It is especially important if you are overweight. Being overweight increases your risk of heart disease, stroke, diabetes, high blood pressure, and several types of cancer. Exercising, and reducing the calories you consume, can help you lose weight and improve fitness and health.  Exercise can be moderate or vigorous intensity. To lose weight, most people need to do a certain amount of moderate or vigorous-intensity exercise each week.  How can exercise affect me?  You lose weight when you exercise enough to burn more calories than you eat. Exercise also reduces body fat and builds muscle. The more muscle you have, the more calories you burn. Exercise also:  Improves mood.  Reduces stress and tension.  Improves your overall fitness, flexibility, and endurance.  Increases bone strength.  Moderate-intensity exercise  Moderate-intensity exercise is any activity that gets you moving enough to burn at least three times more energy (calories) than if you were sitting.  Examples of moderate exercise include:  Walking a mile in 15 minutes.  Doing light yard work.  Biking at an easy pace.  Most people should get at least 150  minutes of moderate-intensity exercise a week to maintain their body weight.  Vigorous-intensity exercise  Vigorous-intensity exercise is any activity that gets you moving enough to burn at least six times more calories than if you were sitting. When you exercise at this intensity, you should be working hard enough that you are not able to carry on a conversation.  Examples of vigorous exercise include:  Running.  Playing a team sport, such as football, basketball, and soccer.  Jumping rope.  Most people should get at least 75 minutes a week of vigorous exercise to maintain their body weight.  What actions can I take to lose weight?  The amount of exercise you need to lose weight depends on:  Your age.  The type of exercise.  Any health conditions you have.  Your overall physical ability.  Talk to your health care provider about how much exercise you need and what types of activities are safe for you.  Nutrition    Make changes to your diet as told by your health care provider or diet and nutrition specialist (dietitian). This may include:  Eating fewer calories.  Eating more protein.  Eating less unhealthy fats.  Eating a diet that includes fresh fruits and vegetables, whole grains, low-fat dairy products, and lean protein.  Avoiding foods with added fat, salt, and sugar.  Drink plenty of water while you exercise to prevent dehydration or heat stroke.  Activity  Choose an activity that you enjoy and set realistic goals. Your health care provider can help you make an exercise plan that works for you.  Exercise at a moderate or vigorous intensity most days of the week.  The intensity of exercise may vary from person to person. You can tell how intense a workout is for you by paying attention to your breathing and heartbeat. Most people will notice their breathing and heartbeat get faster with more intense exercise.  Do resistance training twice each week, such as:  Push-ups.  Sit-ups.  Lifting weights.  Using  resistance bands.  Getting short amounts of exercise can be just as helpful as long, structured periods of exercise. If you have trouble finding time to exercise, try doing these things as part of your daily routine:  Get up, stretch, and walk around every 30 minutes throughout the day.  Go for a walk during your lunch break.  Park your car farther away from your destination.  If you take public transportation, get off one stop early and walk the rest of the way.  Make phone calls while standing up and walking around.  Take the stairs instead of elevators or escalators.  Wear comfortable clothes and shoes with good support.  Do not exercise so much that you hurt yourself, feel dizzy, or get very short of breath.  Where to find more information  [place] Department of Health and Human Services: www.hhs.gov  Centers for Disease Control and Prevention: www.cdc.gov  Contact a health care provider:  Before starting a new exercise program.  If you have questions or concerns about your weight.  If you have a medical problem that keeps you from exercising.  Get help right away if:  You have any of the following while exercising:  Injury.  Dizziness.  Difficulty breathing or shortness of breath that does not go away when you stop exercising.  Chest pain.  Rapid heartbeat.  These symptoms may represent a serious problem that is an emergency. Do not wait to see if the symptoms will go away. Get medical help right away. Call your local emergency services (911 in the [place] Do not drive yourself to the hospital.  Summary  Getting regular exercise is especially important if you are overweight.  Being overweight increases your risk of heart disease, stroke, diabetes, high blood pressure, and several types of cancer.  Losing weight happens when you burn more calories than you eat.  Reducing the amount of calories you eat, and getting regular moderate or vigorous exercise each week, helps you lose weight.  This information is not  intended to replace advice given to you by your health care provider. Make sure you discuss any questions you have with your health care provider.  Document Revised: 02/13/2022 Document Reviewed: 02/13/2022  [employer] Patient Education © 2022 [employer]

## 2024-11-21 DIAGNOSIS — F41.1 GAD (GENERALIZED ANXIETY DISORDER): ICD-10-CM

## 2024-11-21 RX ORDER — FLUOXETINE 40 MG/1
40 CAPSULE ORAL DAILY
Qty: 90 CAPSULE | Refills: 1 | Status: SHIPPED | OUTPATIENT
Start: 2024-11-21

## 2024-12-12 LAB
25(OH)D3+25(OH)D2 SERPL-MCNC: 74.8 NG/ML (ref 30–100)
ALBUMIN SERPL-MCNC: 4.2 G/DL (ref 3.5–5.2)
ALBUMIN/CREAT UR: 4 MG/G CREAT (ref 0–29)
ALBUMIN/GLOB SERPL: 1.4 G/DL
ALP SERPL-CCNC: 65 U/L (ref 39–117)
ALT SERPL-CCNC: 8 U/L (ref 1–33)
AST SERPL-CCNC: 14 U/L (ref 1–32)
BILIRUB SERPL-MCNC: 0.5 MG/DL (ref 0–1.2)
BUN SERPL-MCNC: 13 MG/DL (ref 8–23)
BUN/CREAT SERPL: 14.6 (ref 7–25)
CALCIUM SERPL-MCNC: 9.8 MG/DL (ref 8.6–10.5)
CHLORIDE SERPL-SCNC: 102 MMOL/L (ref 98–107)
CHOLEST SERPL-MCNC: 231 MG/DL (ref 0–200)
CO2 SERPL-SCNC: 28.2 MMOL/L (ref 22–29)
CREAT SERPL-MCNC: 0.89 MG/DL (ref 0.57–1)
CREAT UR-MCNC: 135.8 MG/DL
EGFRCR SERPLBLD CKD-EPI 2021: 70.3 ML/MIN/1.73
GLOBULIN SER CALC-MCNC: 3 GM/DL
GLUCOSE SERPL-MCNC: 91 MG/DL (ref 65–99)
HBA1C MFR BLD: 5.8 % (ref 4.8–5.6)
HDLC SERPL-MCNC: 78 MG/DL (ref 40–60)
LDLC SERPL CALC-MCNC: 141 MG/DL (ref 0–100)
MICROALBUMIN UR-MCNC: 5.3 UG/ML
POTASSIUM SERPL-SCNC: 4.5 MMOL/L (ref 3.5–5.2)
PROT SERPL-MCNC: 7.2 G/DL (ref 6–8.5)
SODIUM SERPL-SCNC: 140 MMOL/L (ref 136–145)
TRIGL SERPL-MCNC: 71 MG/DL (ref 0–150)
TSH SERPL DL<=0.005 MIU/L-ACNC: 6.18 UIU/ML (ref 0.27–4.2)
VLDLC SERPL CALC-MCNC: 12 MG/DL (ref 5–40)

## 2024-12-16 DIAGNOSIS — E03.8 OTHER SPECIFIED HYPOTHYROIDISM: Primary | ICD-10-CM

## 2024-12-16 RX ORDER — LEVOTHYROXINE SODIUM 125 UG/1
125 TABLET ORAL DAILY
Qty: 30 TABLET | Refills: 1 | Status: SHIPPED | OUTPATIENT
Start: 2024-12-16

## 2024-12-17 ENCOUNTER — TELEPHONE (OUTPATIENT)
Dept: FAMILY MEDICINE CLINIC | Facility: CLINIC | Age: 69
End: 2024-12-17
Payer: MEDICARE

## 2024-12-17 NOTE — TELEPHONE ENCOUNTER
Caller: Jennie Lofton MICHELLE    Relationship: Self    Best call back number: [phone]    What medication are you requesting: FLUOXETINE 20 MG    If a prescription is needed, what is your preferred pharmacy and phone number: [facility] [id number] - [facility] [address] - [phone]  - [phone] FX     Additional notes: PATIENT CALLING STATING THAT SHE WAS ADVISED BY HER KIDNEY DOCTOR TO WEAN DOWN ON THE FLUOXETINE DUE TO A PROBLEM WITH HER KIDNEYS. THE PATIENT STATES THAT SHE WAS CURRENTLY TAKING 40 MG DAILY, AND TO WEAN DOWN, THE PATIENT HAS BEEN TAKING 40 MG EVERY OTHER DAY, AND 20 MG, THE OTHER DAYS. THE PATIENT IS REQUESTING A PRESCRIPTION FOR THE 20 MG TO BE SENT TO THE PHARMACY BY DR. SHARON WYMAN. THE PATIENT STATES THAT SHE THOUGHT THAT SHE HAD MENTIONED TO DR. WYMAN HER KIDNEY DOCTORS RECOMMENDATION OF WEANING DOWN ON THE FLUOXETINE, DUE TO THE KIDNEY PROBLEM, BUT WAS NOT SURE IF SHE HAD.

## 2024-12-18 RX ORDER — FLUOXETINE 10 MG/1
CAPSULE ORAL
Qty: 42 CAPSULE | Refills: 0 | Status: SHIPPED | OUTPATIENT
Start: 2024-12-18

## 2024-12-19 NOTE — TELEPHONE ENCOUNTER
Hub to relay    NO, but I got a note from Dr. Barrow and she has low sodium level which could be due to her fluoxetine due SIADH.  Sometimes SSRI class medications can induce this.  The only way to know is wean off and see if gets better.   I would suggest weaning off.  I sent in 10mg caps to take 3 daily for 7 days, then 2 po daily x 7d then 1 po daily x 7d.   Let me know how she feels off of it and also followup with Dr. Barrow to recheck.   RRJ     I left detailed msg informing pt and sent jose luis msg.

## 2024-12-20 ENCOUNTER — TELEPHONE (OUTPATIENT)
Dept: FAMILY MEDICINE CLINIC | Facility: CLINIC | Age: 69
End: 2024-12-20
Payer: MEDICARE

## 2024-12-20 NOTE — TELEPHONE ENCOUNTER
----- Message from Melody RUTLEDGE sent at 12/16/2024  5:23 PM EST -----  Per pt, she has been scared of taking rosuvastatin but will start it now. Do you want her to start at 5 or 10mg? She is aware of her results, med sent and lab ordered

## 2025-01-13 DIAGNOSIS — I10 ESSENTIAL (PRIMARY) HYPERTENSION: ICD-10-CM

## 2025-01-13 DIAGNOSIS — E11.9 TYPE 2 DIABETES MELLITUS WITHOUT COMPLICATION, WITHOUT LONG-TERM CURRENT USE OF INSULIN: ICD-10-CM

## 2025-01-13 RX ORDER — LISINOPRIL 20 MG/1
20 TABLET ORAL DAILY
Qty: 90 TABLET | Refills: 2 | Status: SHIPPED | OUTPATIENT
Start: 2025-01-13

## 2025-02-12 ENCOUNTER — TELEPHONE (OUTPATIENT)
Dept: FAMILY MEDICINE CLINIC | Facility: CLINIC | Age: 70
End: 2025-02-12
Payer: MEDICARE

## 2025-02-12 LAB — TSH SERPL DL<=0.005 MIU/L-ACNC: 6.27 UIU/ML (ref 0.27–4.2)

## 2025-02-14 DIAGNOSIS — E03.8 OTHER SPECIFIED HYPOTHYROIDISM: Primary | ICD-10-CM

## 2025-02-14 RX ORDER — LEVOTHYROXINE SODIUM 137 UG/1
137 TABLET ORAL
Qty: 30 TABLET | Refills: 1 | Status: SHIPPED | OUTPATIENT
Start: 2025-02-14

## 2025-02-25 DIAGNOSIS — E11.65 TYPE 2 DIABETES MELLITUS WITH HYPERGLYCEMIA, WITHOUT LONG-TERM CURRENT USE OF INSULIN: ICD-10-CM

## 2025-02-25 RX ORDER — PEN NEEDLE, DIABETIC 32GX 5/32"
NEEDLE, DISPOSABLE MISCELLANEOUS
Qty: 90 EACH | Refills: 1 | Status: SHIPPED | OUTPATIENT
Start: 2025-02-25

## 2025-04-09 DIAGNOSIS — E03.8 OTHER SPECIFIED HYPOTHYROIDISM: Primary | ICD-10-CM

## 2025-04-09 LAB — TSH SERPL DL<=0.005 MIU/L-ACNC: 0.72 UIU/ML (ref 0.27–4.2)

## 2025-04-13 ENCOUNTER — RESULTS FOLLOW-UP (OUTPATIENT)
Dept: FAMILY MEDICINE CLINIC | Facility: CLINIC | Age: 70
End: 2025-04-13
Payer: MEDICARE

## 2025-04-13 NOTE — LETTER
25-Jan-2019 11:06 Jennie Lofton  [address] [place] [address]    April 13, 2025     Dear Ms. Lofton:    Below are the results from your recent visit:    Thyroid appropriate range now, contin same dose.     Resulted Orders   TSH   Result Value Ref Range    TSH 0.719 0.270 - 4.200 uIU/mL     If you have any questions or concerns, please don't hesitate to call.    Sincerely,        Frank Saenz, DO

## 2025-04-16 RX ORDER — LEVOTHYROXINE SODIUM 137 UG/1
137 TABLET ORAL
Qty: 30 TABLET | Refills: 1 | Status: SHIPPED | OUTPATIENT
Start: 2025-04-16

## 2025-04-21 DIAGNOSIS — E11.65 TYPE 2 DIABETES MELLITUS WITH HYPERGLYCEMIA, WITHOUT LONG-TERM CURRENT USE OF INSULIN: ICD-10-CM

## 2025-04-21 RX ORDER — LANCETS
EACH MISCELLANEOUS
Qty: 100 EACH | Refills: 3 | Status: SHIPPED | OUTPATIENT
Start: 2025-04-21

## 2025-05-13 ENCOUNTER — OFFICE VISIT (OUTPATIENT)
Dept: FAMILY MEDICINE CLINIC | Facility: CLINIC | Age: 70
End: 2025-05-13
Payer: MEDICARE

## 2025-05-13 ENCOUNTER — TELEPHONE (OUTPATIENT)
Dept: FAMILY MEDICINE CLINIC | Facility: CLINIC | Age: 70
End: 2025-05-13

## 2025-05-13 VITALS
HEART RATE: 85 BPM | OXYGEN SATURATION: 98 % | SYSTOLIC BLOOD PRESSURE: 130 MMHG | WEIGHT: 187 LBS | BODY MASS INDEX: 29.35 KG/M2 | HEIGHT: 67 IN | DIASTOLIC BLOOD PRESSURE: 70 MMHG

## 2025-05-13 DIAGNOSIS — E11.9 TYPE 2 DIABETES MELLITUS WITHOUT COMPLICATION, WITH LONG-TERM CURRENT USE OF INSULIN: Primary | ICD-10-CM

## 2025-05-13 DIAGNOSIS — Z79.4 TYPE 2 DIABETES MELLITUS WITHOUT COMPLICATION, WITH LONG-TERM CURRENT USE OF INSULIN: Primary | ICD-10-CM

## 2025-05-13 DIAGNOSIS — E03.8 OTHER SPECIFIED HYPOTHYROIDISM: ICD-10-CM

## 2025-05-13 DIAGNOSIS — Z12.31 ENCOUNTER FOR SCREENING MAMMOGRAM FOR MALIGNANT NEOPLASM OF BREAST: Primary | ICD-10-CM

## 2025-05-13 DIAGNOSIS — F41.1 GAD (GENERALIZED ANXIETY DISORDER): ICD-10-CM

## 2025-05-13 DIAGNOSIS — E78.5 DYSLIPIDEMIA: ICD-10-CM

## 2025-05-13 DIAGNOSIS — I10 ESSENTIAL HYPERTENSION: ICD-10-CM

## 2025-05-13 RX ORDER — INSULIN GLARGINE 300 U/ML
20 INJECTION, SOLUTION SUBCUTANEOUS NIGHTLY
Qty: 6 ML | Refills: 3 | Status: SHIPPED | OUTPATIENT
Start: 2025-05-13

## 2025-05-13 RX ORDER — FLUOXETINE HYDROCHLORIDE 40 MG/1
40 CAPSULE ORAL DAILY
Qty: 90 CAPSULE | Refills: 3 | Status: SHIPPED | OUTPATIENT
Start: 2025-05-13

## 2025-05-13 NOTE — TELEPHONE ENCOUNTER
Caller: Jennie Lofton    Relationship: Self    Best call back number: [phone]     What orders are you requesting (i.e. lab or imaging): MAMMOGRAM ORDERS    Where will you receive your lab/imaging services: [facility] [address]

## 2025-05-13 NOTE — PROGRESS NOTES
Subjective   Jennie Lofton is a 70 y.o. female. Presents today for   Chief Complaint   Patient presents with    Diabetes    Anxiety    Hypertension    Hypothyroidism    Hyperlipidemia         History of Present Illness  History of Present Illness  The patient is a 70-year-old female with type 2 diabetes, dyslipidemia, hypertension, and hypothyroidism. She is here today for follow-up of all her chronic conditions.    Her thyroid was underactive in December 2024, and the dose was adjusted. It was rechecked on 02/12/2025 and was still underactive, so the dose was adjusted again. TSH on 04/09/2025 was 0.719, and she was continued on levothyroxine 37 mcg. She reports overall good health, although she has experienced some weight gain, which she attributes to dietary indulgences. She is not experiencing any chest pain, shortness of breath, or palpitations. However, she did note a sensation of heart fluttering after resuming coffee consumption following a period of abstinence. This symptom resolved upon cessation of coffee intake.    Her A1c on 12/11/2024 was 5.8%, well controlled down from 8.3% in May 2024. CMP was normal on that date. She is on insulin Toujeo and Januvia for diabetes management. She is currently on a regimen of 20 units of insulin and has requested a prescription for a three-month supply.    For hypertension, she is on lisinopril 20 mg daily and should continue this. Her blood pressure is well controlled at 130/70.    Her total cholesterol on 12/11/2024 was 231, HDL 78, . She had not started rosuvastatin 5 mg at that time and was not on a statin.    She is currently on fluoxetine 40 mg, which she reports as beneficial. She attempted to reduce the dosage to 30 mg, which was well-tolerated, but a further reduction to 20 mg resulted in adverse effects. She subsequently increased the dosage back to 40 mg, which led to a temporary exacerbation of symptoms before stabilizing. She expresses a desire  "to maintain the current dosage of 40 mg.    She had a scheduled appointment with her nephrologist in 2025, which was canceled and rescheduled for  or 2025.  Review of Systems   Respiratory:  Negative for shortness of breath.    Cardiovascular:  Negative for chest pain and palpitations.   Gastrointestinal:  Negative for abdominal pain.       Patient Active Problem List   Diagnosis    Type 2 diabetes mellitus without complication    Hyponatremia    Hypothyroid    Essential hypertension    Anxiety    Luetscher's syndrome    Encounter for screening colonoscopy    History of breast cancer    Arthritis of right hip    Status post right hip replacement    Right sided sciatica    Chronic SI joint pain       Social History     Socioeconomic History    Marital status:    Tobacco Use    Smoking status: Former     Current packs/day: 0.00     Average packs/day: 1 pack/day for 4.0 years (4.0 ttl pk-yrs)     Types: Cigarettes     Start date: 1973     Quit date: 1977     Years since quittin.7     Passive exposure: Past    Smokeless tobacco: Never    Tobacco comments:     college years [id number]   Vaping Use    Vaping status: Former   Substance and Sexual Activity    Alcohol use: Yes     Alcohol/week: 2.0 standard drinks of alcohol     Types: 1 Glasses of wine, 1 Cans of beer per week     Comment: social/ wine/beer occasionally    Drug use: No    Sexual activity: Not Currently     Partners: Male     Birth control/protection: None       Allergies   Allergen Reactions    Metformin And Related GI Intolerance     Admitted x2 with lactic acidosis    Codeine Anxiety, Dizziness and Nausea Only    Letrozole Rash    Naproxen Anxiety and Dizziness    Nickel Rash         Objective   Vitals:    25 1105   BP: 130/70   Pulse: 85   SpO2: 98%   Weight: 84.8 kg (187 lb)   Height: 170.2 cm (67\")     Body mass index is 29.29 kg/m².    Physical Exam  Vitals and nursing note reviewed.   Constitutional:       " Appearance: She is well-developed.   HENT:      Head: Normocephalic and atraumatic.   Neck:      Thyroid: No thyromegaly.      Vascular: No JVD.   Cardiovascular:      Rate and Rhythm: Normal rate and regular rhythm.      Heart sounds: Normal heart sounds. No murmur heard.     No friction rub. No gallop.   Pulmonary:      Effort: Pulmonary effort is normal. No respiratory distress.      Breath sounds: Normal breath sounds. No wheezing or rales.   Abdominal:      General: Bowel sounds are normal. There is no distension.      Palpations: Abdomen is soft.      Tenderness: There is no abdominal tenderness. There is no guarding or rebound.   Musculoskeletal:      Cervical back: Neck supple.   Skin:     General: Skin is warm and dry.   Neurological:      Mental Status: She is alert.      Gait: Gait normal.   Psychiatric:         Behavior: Behavior normal.       Physical Exam  Respiratory: Clear to auscultation, no wheezing, rales or rhonchi  Cardiovascular: Regular rate and rhythm, no murmurs, rubs, or gallops  Gastrointestinal: Soft, no tenderness, no distention, no masses  Extremities: No edema, no cyanosis    Results  Labs   - TSH: 04/09/2025, 0.719   - A1c: 12/11/2024, 5.8%   - Total cholesterol: 12/11/2024, 231   - HDL: 12/11/2024, 78   - LDL: 12/11/2024, 141   - CMP: 12/11/2024, Normal     Assessment & Plan   Diagnoses and all orders for this visit:    1. Type 2 diabetes mellitus without complication, with long-term current use of insulin (Primary)  -     Insulin Glargine, 1 Unit Dial, (Toujeo SoloStar) 300 UNIT/ML solution pen-injector injection; Inject 20 Units under the skin into the appropriate area as directed Every Night.  Dispense: 6 mL; Refill: 3  -     Microalbumin / Creatinine Urine Ratio - Urine, Clean Catch  -     Lipid Panel  -     Comprehensive Metabolic Panel  -     Hemoglobin A1c  -     TSH    2. Essential hypertension  -     Comprehensive Metabolic Panel    3. Other specified hypothyroidism  -      TSH    4. Dyslipidemia  -     Lipid Panel  -     Comprehensive Metabolic Panel    5. JAIRO (generalized anxiety disorder)  -     FLUoxetine (PROzac) 40 MG capsule; Take 1 capsule by mouth Daily.  Dispense: 90 capsule; Refill: 3           Assessment & Plan  1. Hypothyroidism.  - Thyroid levels were underactive in December and February, leading to dose adjustments.  - TSH on 04/09/2025 was 0.719.  - Continued on levothyroxine 37 mcg.  - Labs will be ordered to monitor thyroid function before the next visit.    2. Type 2 diabetes mellitus.  - A1c on 12/11/2024 was 5.8%, well controlled down from May 2024.  - Currently on insulin Toujeo and Januvia.  - Prescription for a three-month supply of insulin has been sent to pharmacy.    3. Dyslipidemia.  - Total cholesterol on 12/11/2024 was 231, HDL 78, .  - Had not started rosuvastatin 5 mg at that time and was not on a statin.  - No current statin therapy mentioned.    4. Hypertension.  - Blood pressure is well controlled at 130/70 mmHg.  - Currently on lisinopril 20 mg daily.  - Continued on current medication regimen.    5. Depression.  - Currently stable on fluoxetine 40 mg.  - Attempted to reduce dosage but experienced adverse effects.  - Prescription for fluoxetine 40 mg has been sent to pharmacy.    6. Nephrology follow-up.  - Last kidney function was normal, and sodium level has returned to normal.  - Follow-up with nephrologist scheduled for June or July.    Follow-up  - Follow up in 4 months.          -Follow up: 4 months and prn     ________________________________________  Frank Saenz DO, MS    Current Outpatient Medications on File Prior to Visit   Medication Sig Dispense Refill    Accu-Chek Softclix Lancets lancets USE TO TEST BLOOD SUGAR ONE A  each 3    Cholecalciferol (VITAMIN D3) 125 MCG (5000 UT) capsule capsule TAKE ONE CAPSULE BY MOUTH DAILY 90 capsule 0    glucose blood (Accu-Chek Maddison Plus) test strip 1 each by Other route Daily.  100 each 3    Insulin Pen Needle (BD Pen Needle Dianne 2nd Gen) 32G X 4 MM misc USE 1 PEN NEEDLE DAILY 90 each 1    Januvia 100 MG tablet TAKE 1 TABLET DAILY 90 tablet 3    levothyroxine (SYNTHROID, LEVOTHROID) 137 MCG tablet TAKE 1 TABLET BY MOUTH EVERY MORNING 30 tablet 1    lisinopril (PRINIVIL,ZESTRIL) 20 MG tablet TAKE 1 TABLET BY MOUTH DAILY 90 tablet 2    Multiple Vitamins-Minerals (PRESERVISION AREDS 2 PO) Take  by mouth.      ondansetron ODT (ZOFRAN-ODT) 4 MG disintegrating tablet Place 1 tablet on the tongue Every 8 (Eight) Hours As Needed for Vomiting. 30 tablet 0    [DISCONTINUED] FLUoxetine (PROzac) 10 MG capsule 3 po daily x 7d, then 2 po daily x 7d, then 1 po daily x 7d 42 capsule 0    [DISCONTINUED] Insulin Glargine, 1 Unit Dial, (Toujeo SoloStar) 300 UNIT/ML solution pen-injector injection Inject 20 Units under the skin into the appropriate area as directed Every Night. 6 mL 0    [DISCONTINUED] rosuvastatin (Crestor) 5 MG tablet Take 1 tablet by mouth Daily. (Patient not taking: Reported on 5/13/2025) 90 tablet 3     No current facility-administered medications on file prior to visit.

## 2025-05-13 NOTE — TELEPHONE ENCOUNTER
These orders were faxed back in November 2024.  If patient has not scheduled, please place new outgoing orders to be sent again.    Thanks

## 2025-05-21 RX ORDER — LEVOTHYROXINE SODIUM 137 UG/1
137 TABLET ORAL
Qty: 30 TABLET | Refills: 1 | Status: SHIPPED | OUTPATIENT
Start: 2025-05-21

## 2025-05-21 NOTE — TELEPHONE ENCOUNTER
Caller: Jennie Lofton    Relationship: Self    Best call back number:     Requested Prescriptions:   Requested Prescriptions     Pending Prescriptions Disp Refills    levothyroxine (SYNTHROID, LEVOTHROID) 137 MCG tablet 30 tablet 1     Sig: Take 1 tablet by mouth Every Morning.        Pharmacy where request should be sent: [id number] [id number] - [phone]  - [phone]     Last office visit with prescribing clinician: 5/13/2025   Last telemedicine visit with prescribing clinician: Visit date not found   Next office visit with prescribing clinician: 9/16/2025     Additional details provided by patient: PATIENT IS OUT OF THE MEDICATION AND HAS MISSED TAKING IT FOR 2 DAYS NOW.     Does the patient have less than a 3 day supply:  [x] Yes  [] No      Mindy Kang Rep   05/21/25 08:29 EDT

## 2025-07-23 RX ORDER — LEVOTHYROXINE SODIUM 137 UG/1
137 TABLET ORAL
Qty: 30 TABLET | Refills: 1 | Status: SHIPPED | OUTPATIENT
Start: 2025-07-23

## 2025-08-21 DIAGNOSIS — E11.65 TYPE 2 DIABETES MELLITUS WITH HYPERGLYCEMIA, WITHOUT LONG-TERM CURRENT USE OF INSULIN: ICD-10-CM

## 2025-08-21 RX ORDER — PEN NEEDLE, DIABETIC 32GX 5/32"
NEEDLE, DISPOSABLE MISCELLANEOUS
Qty: 90 EACH | Refills: 3 | Status: SHIPPED | OUTPATIENT
Start: 2025-08-21

## (undated) DEVICE — GLV SURG SENSICARE POLYISPRN W/ALOE PF LF 9 GRN STRL

## (undated) DEVICE — PREMIUM WET SKIN PREP TRAY: Brand: MEDLINE INDUSTRIES, INC.

## (undated) DEVICE — TRAP SPECI SUCTIONPOLYPTRAP 4/CHMBR

## (undated) DEVICE — COVER,MAYO STAND,STERILE: Brand: MEDLINE

## (undated) DEVICE — GLV SURG PREMIERPRO ORTHO LTX PF SZ8.5 BRN

## (undated) DEVICE — DRSNG SURESITE WNDW 4X4.5

## (undated) DEVICE — SCRW SCHNZ SD THRD60 5X175MM
Type: IMPLANTABLE DEVICE | Site: HIP | Status: NON-FUNCTIONAL
Removed: 2019-10-16

## (undated) DEVICE — NDL SPINE 18G 31/2IN PNK

## (undated) DEVICE — SUREFIT, DUAL DISPERSIVE ELECTRODE, CONTACT QUALITY MONITOR: Brand: SUREFIT

## (undated) DEVICE — INSTRUMENT BATTERY

## (undated) DEVICE — SYR LUERLOK 30CC

## (undated) DEVICE — SINGLE-USE BIOPSY FORCEPS: Brand: RADIAL JAW 4

## (undated) DEVICE — SOL BETADINE 4OZ

## (undated) DEVICE — DRSNG TELFA PAD NONADH STR 1S 3X8IN

## (undated) DEVICE — SUT VIC 3/0 PS2 27IN J427H

## (undated) DEVICE — LASSO POLYPECTOMY SNARE: Brand: LASSO

## (undated) DEVICE — PK HIP TOTL 40

## (undated) DEVICE — FLEX ADVANTAGE 1500CC: Brand: FLEX ADVANTAGE

## (undated) DEVICE — HANDPIECE SET WITH COAXIAL HIGH FLOW TIP AND SUCTION TUBE: Brand: INTERPULSE

## (undated) DEVICE — APPL CHLORAPREP W/TINT 26ML ORNG

## (undated) DEVICE — PREP SOL POVIDONE/IODINE BT 4OZ

## (undated) DEVICE — SYRINGE, LUER SLIP, STERILE, 60ML: Brand: MEDLINE

## (undated) DEVICE — SUT VICRYL 1 CT1 27IN  JJ40G

## (undated) DEVICE — ADHS SKIN DERMABOND TOP ADVANCED

## (undated) DEVICE — SPNG GZ WOVN 4X4IN 12PLY 10/BX STRL

## (undated) DEVICE — GLV SURG SENSICARE W/ALOE PF LF 9 STRL

## (undated) DEVICE — KT ORCA ORCAPOD DISP STRL

## (undated) DEVICE — GOWN ,SIRUS,NONREINFORCED 3XL: Brand: MEDLINE

## (undated) DEVICE — GOWN ISOL W/THUMB UNIV BLU BX/15

## (undated) DEVICE — GOWN,NON-REINFORCED,4XL: Brand: MEDLINE

## (undated) DEVICE — Device

## (undated) DEVICE — CANN NASL CO2 TRULINK W/O2 A/

## (undated) DEVICE — VIAL FORMLN CAP 10PCT 20ML